# Patient Record
Sex: MALE | Race: WHITE | Employment: OTHER | URBAN - METROPOLITAN AREA
[De-identification: names, ages, dates, MRNs, and addresses within clinical notes are randomized per-mention and may not be internally consistent; named-entity substitution may affect disease eponyms.]

---

## 2017-01-19 ENCOUNTER — TELEPHONE (OUTPATIENT)
Dept: INTERNAL MEDICINE CLINIC | Age: 67
End: 2017-01-19

## 2017-01-19 NOTE — TELEPHONE ENCOUNTER
Spoke with patient, he wanted to know if he had a pneumonia vaccine before. I let him know that he had the Prevnar-13 in 12/2015. He still needs his Pneumococcal-23. Pt verbalized understanding.

## 2017-01-19 NOTE — TELEPHONE ENCOUNTER
Pt is requesting a nurse call he would like to go over his health maintenance. Pt is concerned with what shots he has been given.     Pt # [id number]

## 2020-08-11 ENCOUNTER — HOSPITAL ENCOUNTER (OUTPATIENT)
Dept: NON INVASIVE DIAGNOSTICS | Age: 70
Discharge: HOME | End: 2020-08-11
Attending: SPECIALIST | Admitting: SPECIALIST
Payer: COMMERCIAL

## 2020-08-11 VITALS
OXYGEN SATURATION: 98 % | RESPIRATION RATE: 23 BRPM | BODY MASS INDEX: 29.65 KG/M2 | DIASTOLIC BLOOD PRESSURE: 87 MMHG | HEIGHT: 74 IN | HEART RATE: 65 BPM | WEIGHT: 231 LBS | SYSTOLIC BLOOD PRESSURE: 118 MMHG

## 2020-08-11 DIAGNOSIS — I49.9 IRREGULAR CARDIAC RHYTHM: ICD-10-CM

## 2020-08-11 PROCEDURE — 92960 CARDIOVERSION ELECTRIC EXT: CPT

## 2020-08-11 PROCEDURE — 99152 MOD SED SAME PHYS/QHP 5/>YRS: CPT | Performed by: SPECIALIST

## 2020-08-11 PROCEDURE — 99152 MOD SED SAME PHYS/QHP 5/>YRS: CPT

## 2020-08-11 PROCEDURE — 74011000250 HC RX REV CODE- 250: Performed by: SPECIALIST

## 2020-08-11 PROCEDURE — 77030018729 HC ELECTRD DEFIB PAD CARD -B

## 2020-08-11 PROCEDURE — 74011250636 HC RX REV CODE- 250/636: Performed by: SPECIALIST

## 2020-08-11 RX ORDER — LIDOCAINE HYDROCHLORIDE 20 MG/ML
JELLY TOPICAL
Status: COMPLETED | OUTPATIENT
Start: 2020-08-11 | End: 2020-08-11

## 2020-08-11 RX ORDER — GUAIFENESIN 100 MG/5ML
81 LIQUID (ML) ORAL DAILY
COMMUNITY
End: 2020-10-29

## 2020-08-11 RX ORDER — LIDOCAINE 50 MG/G
OINTMENT TOPICAL
Status: DISCONTINUED | OUTPATIENT
Start: 2020-08-11 | End: 2020-08-11 | Stop reason: HOSPADM

## 2020-08-11 RX ORDER — LIDOCAINE HYDROCHLORIDE 20 MG/ML
15 SOLUTION OROPHARYNGEAL
Status: DISCONTINUED | OUTPATIENT
Start: 2020-08-11 | End: 2020-08-11 | Stop reason: HOSPADM

## 2020-08-11 RX ORDER — SODIUM CHLORIDE 9 MG/ML
10 INJECTION INTRAMUSCULAR; INTRAVENOUS; SUBCUTANEOUS
Status: DISCONTINUED | OUTPATIENT
Start: 2020-08-11 | End: 2020-08-11 | Stop reason: HOSPADM

## 2020-08-11 RX ORDER — FENTANYL CITRATE 50 UG/ML
25-200 INJECTION, SOLUTION INTRAMUSCULAR; INTRAVENOUS
Status: DISCONTINUED | OUTPATIENT
Start: 2020-08-11 | End: 2020-08-11 | Stop reason: HOSPADM

## 2020-08-11 RX ORDER — MIDAZOLAM HYDROCHLORIDE 1 MG/ML
.5-1 INJECTION, SOLUTION INTRAMUSCULAR; INTRAVENOUS
Status: DISCONTINUED | OUTPATIENT
Start: 2020-08-11 | End: 2020-08-11 | Stop reason: HOSPADM

## 2020-08-11 RX ORDER — METOPROLOL SUCCINATE 25 MG/1
25 TABLET, EXTENDED RELEASE ORAL DAILY
Status: ON HOLD | COMMUNITY
Start: 2020-08-11 | End: 2020-10-15

## 2020-08-11 RX ORDER — METOPROLOL SUCCINATE 25 MG/1
TABLET, EXTENDED RELEASE ORAL 2 TIMES DAILY
Status: ON HOLD | COMMUNITY
End: 2020-08-11 | Stop reason: SDUPTHER

## 2020-08-11 RX ADMIN — MIDAZOLAM HYDROCHLORIDE 2 MG: 1 INJECTION, SOLUTION INTRAMUSCULAR; INTRAVENOUS at 10:23

## 2020-08-11 RX ADMIN — LIDOCAINE HYDROCHLORIDE 15 ML: 20 SOLUTION ORAL; TOPICAL at 10:02

## 2020-08-11 RX ADMIN — MIDAZOLAM HYDROCHLORIDE 1 MG: 1 INJECTION, SOLUTION INTRAMUSCULAR; INTRAVENOUS at 10:35

## 2020-08-11 RX ADMIN — LIDOCAINE HYDROCHLORIDE: 20 JELLY TOPICAL at 10:28

## 2020-08-11 RX ADMIN — FENTANYL CITRATE 50 MCG: 50 INJECTION, SOLUTION INTRAMUSCULAR; INTRAVENOUS at 10:23

## 2020-08-11 RX ADMIN — SODIUM CHLORIDE 10 ML: 9 INJECTION INTRAMUSCULAR; INTRAVENOUS; SUBCUTANEOUS at 10:34

## 2020-08-11 RX ADMIN — FENTANYL CITRATE 25 MCG: 50 INJECTION, SOLUTION INTRAMUSCULAR; INTRAVENOUS at 10:37

## 2020-08-11 RX ADMIN — MIDAZOLAM HYDROCHLORIDE 1 MG: 1 INJECTION, SOLUTION INTRAMUSCULAR; INTRAVENOUS at 10:26

## 2020-08-11 RX ADMIN — LIDOCAINE: 50 OINTMENT TOPICAL at 10:11

## 2020-08-11 NOTE — H&P
Date of Surgery Update:  Arpit Sparks. was seen and examined. History and physical has been reviewed. The patient has been examined. There have been no significant clinical changes since the completion of the originally dated History and Physical.    Signed By: Grant Miranda MD     August 11, 2020 9:55 AM         Marlene Dec 1950   Office/Outpatient Visit  Visit Date: Fri, Aug 7, 2020 8:45 am  Provider: Karen Madrid MD (Assistant: Aster Garcia RN )  Location: [facility]iology of [facility])[address] [place]. [address] [phone]    Electronically signed by Jonel Boswell MD on  08/07/2020 09:06:15 AM                           Subjective:    CC: Mr. Iraida Saucedo is a 65year old White male. His primary care physician is Chiara Gil NP. Chiara Gil NP referred Mr. Iraida Saucedo to the practice for a consult. He is here today following a transition of care from his primary care provider. Patient verbalized medications unchanged since last office visit. He has a history of hypertension. HPI:           Regarding hypertension:  Type Primary Hypertension Currently, his treatment regimen consists of an ACE/ARB ( lisinopril ). 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 This is an initial office visit. The diagnosis was made on 8/5/2020. His [id number] score is 2. Current related medications include a beta blocker for rate control. He has noted shortness of breath. He has not been aware of an irregular heartbeat, chest pain, syncope or dizziness.     Past Medical History / Family History / Social History:     Last Reviewed on 8/07/2020 08:40 AM by Aster Garcia  Past Medical History:     Atrial Flutter  Hypertension   Diverticulosis  Gastroesophageal Reflux Disease   INFLUENZA VACCINE: was last done 2019     Past Cardiac Procedures/Tests:  Stress Echo:  18 - 10 mins. There was no ECG evidence of ischemia. No ischemia noted on echocardiogram, normal pre & post global wall motion. The patient did not experience chest discomfort. Normal  blood pressure response to exercise. Blunted heart rate response to exercise. Good exercise capacity. There is mild mitral regurgitation. There is trace tricuspid regurgitation. There is trace pulmonary regurgitation. Holter Monitor:  18 - Baseline rhythm was normal sinus. Average heart rate was 57 bpm.   Minimum heart rate of 41 bpm and maximum heart rate of 97 bpm.   There were frequent PACs. 8-beat run of accelerated junctional rhythm at 2:18 am on 18. There were occasional PVCs. Ventricular couplets also noted. There were PVCs in bigeminy. No pauses noted. PACs and PVCs were noted during diary entries. No arrhythmias noted in the other entries. Surgical History:   Surgical/Procedural History:   Colon resection- left ()     Family History:   Father:  at age 72  ; Positive for Myocardial Infarction; ;   Mother:   ; Positive for Cerebrovascular Accident; Social History:   Social History:   Occupation: Retired   Marital Status:    Children: None     Tobacco/Alcohol/Supplements:   Last Reviewed on 2020 08:40 AM by Kamini Almaguer  TOBACCO/ALCOHOL/SUPPLEMENTS   Tobacco: He has never smoked. Alcohol: weekends an occasional beer   Caffeine:  He admits to consuming caffeine via coffee ( 3-4 servings per day ) and soda ( 1 serving per day ). Substance Abuse History:   Last Reviewed on 2020 08:40 AM by Kamini Francisco  Substance Use/Abuse: Unremarkable     Mental Health History:   Last Reviewed on 2020 08:40 AM by Kamini Francisco  Mental Health History: NEGATIVE     Communicable Diseases (eg STDs):    Last Reviewed on 2020 08:40 AM by Kamini Francisco    Current Problems:   Last Reviewed on 2020 08:40 AM by Kamini Francisco  Essential hypertension, benign  Essential (primary) hypertension  Sinus bradycardia  Cardiac Murmur  Cardiac murmur  Arrhythmia  Typical atrial flutter    Allergies:   Last Reviewed on 8/07/2020 08:40 AM by Kamini Francisco  No Known Allergies. Current Medications:   Last Reviewed on 8/07/2020 08:40 AM by Kamini Francisco  multivitamin oral tablet [1 po qd]  aspirin 81 mg oral tablet,chewable [1 po qd]  metoprolol tartrate 25 mg oral tablet [take 1 tablet (25 mg) by oral route 2 times per day]  lisinopriL 20 mg oral tablet [1/4 po qd to equal 5mg daily]  metamucil  [1 po bid]    Objective:    Vitals:     Current: 8/7/2020 8:39:42 AM  Ht:  6 ft, 1 in; Wt: 204 lbs;  BMI: 26. 9BP: 134/83 mm Hg (left arm, sitting);  P: 65 bpm;  sCr: 0.92 mg/dL;  GFR: 76.90    Repeat:   8:39:54 AM  BP:   136/82mm Hg (left arm, standing)   Exams:   GENERAL:  Alert, oriented to person, place and time. HEENT:  Pinkish palpebral  conjunctivae. Anicteric sclerae. NECK:  No jugular vein engorgement. No bruit. CHEST: Equal expansion. Clear breath sounds. No rales, no wheezing. Heart: Irregular rhythm. Grade 2/6 systolic ejection murmur at the left sternal border area. ABDOMEN:  Soft. Normal active bowel sounds. No tenderness. EXTREMITIES:  No pitting pedal edema. Equal pulses bilaterally. NEURO:  Grossly intact.       Lab/Test Results:     Sodium, Serum: 144 (05/28/2020),   Potassium, Serum: 4.6 (05/28/2020),   Glucose Serum: 101 (05/28/2020),   BUN serum/plasma (sCnc): 17 (05/28/2020),   Creatinine, Serum: 0.92 (05/28/2020),   HgbA1c: 6.0 (05/28/2020),   Total Cholesterol: 163 (05/28/2020),   Triglycerides: 63 (05/28/2020),   HDL Target >55: 42 (05/28/2020),   LDL Target <110: 108 (05/28/2020),   AST (SGOT): 19 (05/28/2020),   ALT (SGPT): 24 (05/28/2020),   WBC count: 5.7 (05/28/2020),   RBC count: 5.43 (05/28/2020),   Hgb: 14.5 (05/28/2020),   Hct: 45.4 (05/28/2020),   MCV: 84 (05/28/2020),   Platelets: 161 (86/78/2322),       Procedures: Typical atrial flutter    ECG INTERPRETATION: See scanned EKG for results. Assessment:     I10   Essential (primary) hypertension     I48. 3   Typical atrial flutter       ORDERS:     Procedures Ordered:     01452  Electrocardiogram, routine with at least 12 leads; with interpretation and report  (In-House)          26269  Education and train for pt self-mgmt by qualifiedNevaeh 30 minutes; individual pt  (Send-Out)          RFCARD  Cardiologist Referral  (Send-Out)          Other Orders:     [id number]  Queried Patient for Tobacco Use  (Send-Out)              Plan:     Typical atrial flutter  1. Medication list has been reviewed. Start the following medication(s):  Xarelto 20 mg 1 tablet daily. 2.  Advised the patient regarding diet, exercise, and lifestyle modification. Explained to the patient the treatment and complication of atrial flutter and the risk and benefits of blood thinners. Patient understood and agreed to start Xarelto. No alcohol and NSAID's  3. The patient to call the office if there is any change in his cardiac symptoms. 4.  Explained to the patient the importance of controlling his cardiac risk factors. Testing/Procedures:  Cardioversion - to be done in one week Dr. Lynnette Anderson to be done at at [facility].    ED - to be done in one week Dr. Lynnette Anderson to be done at at [facility].  .  The indication, procedure, risks, and benefits of the procedure were explained to the patient. He understands and agreed to proceed with the test.      Schedule a follow-up appointment in 1 week. Referrals:  I am referring Mr. Lissett Noriega to an electrophysiologist: Dr. Carlos Olguin. The above note was transcribed by Bryon Darden. and authenticated by Dr. Omar Chacon prior to sign off.

## 2020-08-11 NOTE — DISCHARGE INSTRUCTIONS
Discharge Instructions:     Medications: Activity:     As tolerated. Diet:     American Heart Association. Follow-up:     Follow up with Moses Alvarenga MD on August 18th, 2020 at 8:15am.  [address] [facility]  [phone]      If you smoke, STOP!

## 2020-08-11 NOTE — PROCEDURES
Indication:  Atrial flutter. Procedure:  Informed consent was obtained. The patient was anesthetized with fentanyl and versed. The patient was converted from atrial flutter to normal sinus rhythm with a single biphasic shock of 200 joules. The patient tolerated the procedure well without obvious complications and was transferred to the holding room in stable condition. Summary:  Successful DCCV of atrial flutter to NSR.     F/U with Dr. Yefri Quevedo on 8/18/20 @ 8:15am.

## 2020-08-11 NOTE — ROUTINE PROCESS
Patient arrived to Non-Invasive Cardiology Lab for Out Patient ED/DCCV Procedure. Staff introduced to patient. Patient identifiers verified with Name and Date of Birth. Procedure verified with patient. Consent forms reviewed and signed by patient or authorized representative and verified. Allergies verified. Patient informed of procedure and plan of care. Questions answered with review. Patient on cardiac monitor, non-invasive blood pressure, SPO2 monitor. Patient is A&Ox3. Patient reports no complaints. Patient on stretcher, in low position, with side rails up. Patient instructed to call for assistance as needed. Family in waiting room.

## 2020-08-14 ENCOUNTER — TELEPHONE (OUTPATIENT)
Dept: CARDIOLOGY CLINIC | Age: 70
End: 2020-08-14

## 2020-08-14 NOTE — TELEPHONE ENCOUNTER
----- Message from Berry Cherry sent at 8/14/2020 12:02 PM EDT -----  Hello,    Called pt to get him scheduled pt informed me that he had a cardioversion on Tuesday still In hosp and is doing ok no more aflutter past 2 ekg's did not show anything. If he needs to see Dr Renato Grijalva in the future he will call to make the appt. Chey Mcgee   ----- Message -----  From: Felicita Wasserman LPN  Sent: 2/64/4142   1:28 PM EDT  To: Berry Cherry    Referral received today from Dr. Dom Cruz, DX: Atrial Flutter.

## 2020-09-23 ENCOUNTER — OFFICE VISIT (OUTPATIENT)
Dept: CARDIOLOGY CLINIC | Age: 70
End: 2020-09-23
Payer: COMMERCIAL

## 2020-09-23 VITALS
WEIGHT: 207 LBS | SYSTOLIC BLOOD PRESSURE: 138 MMHG | OXYGEN SATURATION: 97 % | DIASTOLIC BLOOD PRESSURE: 90 MMHG | HEART RATE: 56 BPM | HEIGHT: 74 IN | BODY MASS INDEX: 26.56 KG/M2

## 2020-09-23 DIAGNOSIS — I49.9 IRREGULAR CARDIAC RHYTHM: Primary | ICD-10-CM

## 2020-09-23 DIAGNOSIS — I45.10 INCOMPLETE RBBB: ICD-10-CM

## 2020-09-23 PROCEDURE — G8419 CALC BMI OUT NRM PARAM NOF/U: HCPCS | Performed by: INTERNAL MEDICINE

## 2020-09-23 PROCEDURE — 93000 ELECTROCARDIOGRAM COMPLETE: CPT | Performed by: INTERNAL MEDICINE

## 2020-09-23 PROCEDURE — 93005 ELECTROCARDIOGRAM TRACING: CPT | Performed by: INTERNAL MEDICINE

## 2020-09-23 PROCEDURE — G9711 PT HX TOT COL OR COLON CA: HCPCS | Performed by: INTERNAL MEDICINE

## 2020-09-23 PROCEDURE — 99205 OFFICE O/P NEW HI 60 MIN: CPT | Performed by: INTERNAL MEDICINE

## 2020-09-23 PROCEDURE — G8510 SCR DEP NEG, NO PLAN REQD: HCPCS | Performed by: INTERNAL MEDICINE

## 2020-09-23 PROCEDURE — G0463 HOSPITAL OUTPT CLINIC VISIT: HCPCS | Performed by: INTERNAL MEDICINE

## 2020-09-23 PROCEDURE — G8536 NO DOC ELDER MAL SCRN: HCPCS | Performed by: INTERNAL MEDICINE

## 2020-09-23 PROCEDURE — G8427 DOCREV CUR MEDS BY ELIG CLIN: HCPCS | Performed by: INTERNAL MEDICINE

## 2020-09-23 PROCEDURE — 1101F PT FALLS ASSESS-DOCD LE1/YR: CPT | Performed by: INTERNAL MEDICINE

## 2020-09-23 NOTE — PROGRESS NOTES
Room 5    Visit Vitals  BP (!) 138/90 (BP 1 Location: Left arm, BP Patient Position: Sitting)   Pulse (!) 56   Ht 6' 2\" (1.88 m)   Wt 207 lb (93.9 kg)   SpO2 97%   BMI 26.58 kg/m²       Cardioversion Aug, 2020    Saw Alicia Alvarenga a week ago, ekg was normal     Chest pain: no  Shortness of breath: no  Edema: no  Palpitations, Skipped beats, Rapid heartbeat: no  Dizziness: no    New diagnosis/Surgeries: no    ER/Hospitalizations: Aug 2020    Refills: no

## 2020-09-23 NOTE — PROGRESS NOTES
HISTORY OF PRESENTING ILLNESS      Raquel Nichole is a 65 y.o. male with atrial flutter s/p ED/DCCV recently referred by Dr. Carol Silverio to discuss ablation. PAST MEDICAL HISTORY     Past Medical History:   Diagnosis Date    Agatston CAC score, <100 06/10/2015    Coronary calcium score 4.    Arthritis     DDD (degenerative disc disease), thoracic     Degenerative arthritis of thumb     Left    Diverticulitis     Diverticulosis of colon 09/29/2016    of the descending colon; moderate    GERD (gastroesophageal reflux disease)     Incomplete RBBB     Numbness     Right hand    Polyp of ascending colon 09/29/2016    polyps (5mm) in the ascenidning colon and transverse colon    Pruritus ani     Sinus headache            PAST SURGICAL HISTORY     Past Surgical History:   Procedure Laterality Date    ABDOMEN SURGERY PROC UNLISTED  12/12    exploratory, small bowel obstruction, internal hernia    ENDOSCOPY, COLON, DIAGNOSTIC  2005    HX COLECTOMY  2005    colectomy for diverticulitis    HX COLONOSCOPY  12/13    HX OTHER SURGICAL      Removal of colon polyps          ALLERGIES     Allergies   Allergen Reactions    Ciprofloxacin Other (comments)     \"Joint pain\"    Naproxen Nausea Only     Patient was overdose, GI side effects          FAMILY HISTORY     Family History   Problem Relation Age of Onset    Stroke Mother     Heart Disease Father     negative for cardiac disease       SOCIAL HISTORY     Social History     Socioeconomic History    Marital status:      Spouse name: Not on file    Number of children: Not on file    Years of education: Not on file    Highest education level: Not on file   Tobacco Use    Smoking status: Never Smoker    Smokeless tobacco: Never Used   Substance and Sexual Activity    Alcohol use: Yes     Comment: rarely    Drug use: No         MEDICATIONS     Current Outpatient Medications   Medication Sig    FIBER CHOICE PO Take  by mouth.  lisinopril (PRINIVIL, ZESTRIL) 5 mg tablet Take 1 Tab by mouth daily.  multivitamin (ONE A DAY) tablet Take 1 Tab by mouth daily.  aspirin 81 mg chewable tablet Take 81 mg by mouth daily.  metoprolol succinate (TOPROL-XL) 25 mg XL tablet Take 1 Tab by mouth daily.  rivaroxaban (Xarelto) 20 mg tab tablet Take 1 Tab by mouth daily (with dinner).  psyllium (METAMUCIL) packet Take 1 Packet by mouth two (2) times a day. No current facility-administered medications for this visit. I have reviewed the nurses notes, vitals, problem list, allergy list, medical history, family, social history and medications. REVIEW OF SYMPTOMS      General: Pt denies excessive weight gain or loss. Pt is able to conduct ADL's  HEENT: Denies blurred vision, headaches, hearing loss, epistaxis and difficulty swallowing. Respiratory: Denies cough, congestion, shortness of breath, BELTRAN, wheezing or stridor. Cardiovascular: Denies precordial pain, palpitations, edema or PND  Gastrointestinal: Denies poor appetite, indigestion, abdominal pain or blood in stool  Genitourinary: Denies hematuria, dysuria, increased urinary frequency  Musculoskeletal: Denies joint pain or swelling from muscles or joints  Neurologic: Denies tremor, paresthesias, headache, or sensory motor disturbance  Psychiatric: Denies confusion, insomnia, depression  Integumentray: Denies rash, itching or ulcers. Hematologic: Denies easy bruising, bleeding       PHYSICAL EXAMINATION      Vitals: see vitals section  General: Well developed, in no acute distress. HEENT: No jaundice, oral mucosa moist, no oral ulcers  Neck: Supple, no stiffness, no lymphadenopathy, supple  Heart:  Normal S1/S2 negative S3 or S4. Regular, no murmur, gallop or rub, no jugular venous distention  Respiratory: Clear bilaterally x 4, no wheezing or rales  Abdomen:   Soft, non-tender, bowel sounds are active.    Extremities:  No edema, normal cap refill, no cyanosis. Musculoskeletal: No clubbing, no deformities  Neuro: A&Ox3, speech clear, gait stable, cooperative, no focal neurologic deficits  Skin: Skin color is normal. No rashes or lesions. Non diaphoretic, moist.  Vascular: 2+ pulses symmetric in all extremities       DIAGNOSTIC DATA      EKG:        LABORATORY DATA      Lab Results   Component Value Date/Time    WBC 6.5 11/25/2015 08:24 AM    HGB 13.9 11/25/2015 08:24 AM    HCT 42.3 11/25/2015 08:24 AM    PLATELET 057 81/04/6451 08:24 AM    MCV 84.3 11/25/2015 08:24 AM      Lab Results   Component Value Date/Time    Sodium 141 11/25/2015 08:24 AM    Potassium 4.7 11/25/2015 08:24 AM    Chloride 107 11/25/2015 08:24 AM    CO2 30 11/25/2015 08:24 AM    Anion gap 4 11/25/2015 08:24 AM    Glucose 99 11/25/2015 08:24 AM    BUN 22 (H) 11/25/2015 08:24 AM    Creatinine 1.09 11/25/2015 08:24 AM    BUN/Creatinine ratio 20 11/25/2015 08:24 AM    GFR est AA >60 11/25/2015 08:24 AM    GFR est non-AA >60 11/25/2015 08:24 AM    Calcium 8.7 11/25/2015 08:24 AM    Bilirubin, total 0.3 11/25/2015 08:24 AM    Alk. phosphatase 86 11/25/2015 08:24 AM    Protein, total 6.1 (L) 11/25/2015 08:24 AM    Albumin 3.5 11/25/2015 08:24 AM    Globulin 2.6 11/25/2015 08:24 AM    A-G Ratio 1.3 11/25/2015 08:24 AM    ALT (SGPT) 37 11/25/2015 08:24 AM           ASSESSMENT      1. Atrial flutter   A. DCCV  2. Hypertension         PLAN     Plan for atrial flutter ablation with conscious sedation. Continue all meds prior. ICD-10-CM ICD-9-CM    1. Irregular cardiac rhythm  I49.9 427.9 AMB POC EKG ROUTINE W/ 12 LEADS, INTER & REP   2. Incomplete RBBB  I45.10 426.4      Orders Placed This Encounter    AMB POC EKG ROUTINE W/ 12 LEADS, INTER & REP     Order Specific Question:   Reason for Exam:     Answer:   Atrial flutter    FIBER CHOICE PO     Sig: Take  by mouth.           FOLLOW-UP       Thank you, Sonia Cazares MD and Dr. Melissa Pendleton for allowing me to participate in the care of this extraordinarily pleasant male. Please do not hesitate to contact me for further questions/concerns.          Shanon Curtis MD  Cardiac Electrophysiology / Cardiology    [address]  [facility] [facility], [facility], [address]  [phone] / [phone] Fax   [phone] / [phone] Fax

## 2020-09-24 ENCOUNTER — TELEPHONE (OUTPATIENT)
Dept: CARDIOLOGY CLINIC | Age: 70
End: 2020-09-24

## 2020-09-24 DIAGNOSIS — I49.9 IRREGULAR CARDIAC RHYTHM: Primary | ICD-10-CM

## 2020-09-24 DIAGNOSIS — I48.92 ATRIAL FLUTTER, UNSPECIFIED TYPE (HCC): ICD-10-CM

## 2020-09-24 DIAGNOSIS — Z01.812 PRE-PROCEDURE LAB EXAM: ICD-10-CM

## 2020-09-24 RX ORDER — SODIUM CHLORIDE 0.9 % (FLUSH) 0.9 %
5-40 SYRINGE (ML) INJECTION EVERY 8 HOURS
Status: CANCELLED | OUTPATIENT
Start: 2020-09-24

## 2020-09-24 RX ORDER — SODIUM CHLORIDE 0.9 % (FLUSH) 0.9 %
5-40 SYRINGE (ML) INJECTION AS NEEDED
Status: CANCELLED | OUTPATIENT
Start: 2020-09-24

## 2020-09-24 NOTE — TELEPHONE ENCOUNTER
Received call from patient, ID verified using two patient identifiers. Scheduled for AFL ablation with Dr. Amparo Richardson @ [facility] on Thursday 10/15/20 with an arrival time of 6 AM. Pre procedure instructions to be mailed to patient's home address. Patient agreeable to this plan.

## 2020-09-24 NOTE — TELEPHONE ENCOUNTER
Called patient, no answer. LM to have call returned to [phone]. Need to schedule AFL ablation with Dr. Jamil Padilla.

## 2020-10-11 ENCOUNTER — TRANSCRIBE ORDER (OUTPATIENT)
Dept: REGISTRATION | Age: 70
End: 2020-10-11

## 2020-10-11 ENCOUNTER — HOSPITAL ENCOUNTER (OUTPATIENT)
Dept: LAB | Age: 70
Discharge: HOME | End: 2020-10-11
Payer: COMMERCIAL

## 2020-10-11 DIAGNOSIS — U07.1 COVID-19: ICD-10-CM

## 2020-10-11 DIAGNOSIS — U07.1 COVID-19: Primary | ICD-10-CM

## 2020-10-11 PROCEDURE — 87635 SARS-COV-2 COVID-19 AMP PRB: CPT

## 2020-10-12 ENCOUNTER — TELEPHONE (OUTPATIENT)
Dept: CARDIOLOGY CLINIC | Age: 70
End: 2020-10-12

## 2020-10-12 LAB — SARS-COV-2, COV2NT: NOT DETECTED

## 2020-10-12 NOTE — TELEPHONE ENCOUNTER
Received call from patient, ID verified using two patient identifiers. Patient states he received a voicemail message to return call tor our office. Advised patient that I had not called and that I did not see any messages from anyone else that they had called him. Patient verbalizes understanding and will try to listen to the message again to verify who called.

## 2020-10-13 ENCOUNTER — TELEPHONE (OUTPATIENT)
Dept: CARDIOLOGY CLINIC | Age: 70
End: 2020-10-13

## 2020-10-13 LAB
BUN SERPL-MCNC: 20 MG/DL (ref 8–27)
BUN/CREAT SERPL: 24 (ref 10–24)
CALCIUM SERPL-MCNC: 9.7 MG/DL (ref 8.6–10.2)
CHLORIDE SERPL-SCNC: 108 MMOL/L (ref 96–106)
CO2 SERPL-SCNC: 18 MMOL/L (ref 20–29)
CREAT SERPL-MCNC: 0.85 MG/DL (ref 0.76–1.27)
ERYTHROCYTE [DISTWIDTH] IN BLOOD BY AUTOMATED COUNT: 13.8 % (ref 11.6–15.4)
GLUCOSE SERPL-MCNC: 98 MG/DL (ref 65–99)
HCT VFR BLD AUTO: 42.5 % (ref 37.5–51)
HGB BLD-MCNC: 13.7 G/DL (ref 13–17.7)
INR PPP: 1.1 (ref 0.9–1.2)
MCH RBC QN AUTO: 27 PG (ref 26.6–33)
MCHC RBC AUTO-ENTMCNC: 32.2 G/DL (ref 31.5–35.7)
MCV RBC AUTO: 84 FL (ref 79–97)
PLATELET # BLD AUTO: 226 X10E3/UL (ref 150–450)
POTASSIUM SERPL-SCNC: 4.5 MMOL/L (ref 3.5–5.2)
PROTHROMBIN TIME: 11.5 SEC (ref 9.1–12)
RBC # BLD AUTO: 5.07 X10E6/UL (ref 4.14–5.8)
SODIUM SERPL-SCNC: 141 MMOL/L (ref 134–144)
WBC # BLD AUTO: 6.7 X10E3/UL (ref 3.4–10.8)

## 2020-10-13 NOTE — TELEPHONE ENCOUNTER
Pt requesting to speak with Katy in regards to his procedure on 10/15.  Please advise    NTUGN:[phone]

## 2020-10-13 NOTE — TELEPHONE ENCOUNTER
Returned patient call, ID verified using two patient identifiers. Patient states he is having a runny nose and occasional sneeze. Wants to make sure he can proceed with his AFL ablation on 10/15/20. Advised patient that he should be fine to proceed. He will contact the office if he develops a fever, chills or cough prior to his procedure.

## 2020-10-15 ENCOUNTER — HOSPITAL ENCOUNTER (OUTPATIENT)
Age: 70
Setting detail: OUTPATIENT SURGERY
Discharge: HOME | End: 2020-10-15
Attending: INTERNAL MEDICINE | Admitting: INTERNAL MEDICINE
Payer: COMMERCIAL

## 2020-10-15 VITALS
SYSTOLIC BLOOD PRESSURE: 124 MMHG | RESPIRATION RATE: 15 BRPM | HEART RATE: 56 BPM | WEIGHT: 201.72 LBS | TEMPERATURE: 97.6 F | BODY MASS INDEX: 25.89 KG/M2 | OXYGEN SATURATION: 98 % | HEIGHT: 74 IN | DIASTOLIC BLOOD PRESSURE: 47 MMHG

## 2020-10-15 DIAGNOSIS — I48.92 ATRIAL FLUTTER, UNSPECIFIED TYPE (HCC): ICD-10-CM

## 2020-10-15 PROCEDURE — 99153 MOD SED SAME PHYS/QHP EA: CPT | Performed by: INTERNAL MEDICINE

## 2020-10-15 PROCEDURE — 93613 INTRACARDIAC EPHYS 3D MAPG: CPT | Performed by: INTERNAL MEDICINE

## 2020-10-15 PROCEDURE — 93621 COMP EP EVL L PAC&REC C SINS: CPT | Performed by: INTERNAL MEDICINE

## 2020-10-15 PROCEDURE — 77030029065 HC DRSG HEMO QCLOT ZMED -B: Performed by: INTERNAL MEDICINE

## 2020-10-15 PROCEDURE — C1760 CLOSURE DEV, VASC: HCPCS | Performed by: INTERNAL MEDICINE

## 2020-10-15 PROCEDURE — C1894 INTRO/SHEATH, NON-LASER: HCPCS | Performed by: INTERNAL MEDICINE

## 2020-10-15 PROCEDURE — C1732 CATH, EP, DIAG/ABL, 3D/VECT: HCPCS | Performed by: INTERNAL MEDICINE

## 2020-10-15 PROCEDURE — 99152 MOD SED SAME PHYS/QHP 5/>YRS: CPT | Performed by: INTERNAL MEDICINE

## 2020-10-15 PROCEDURE — 93653 COMPRE EP EVAL TX SVT: CPT | Performed by: INTERNAL MEDICINE

## 2020-10-15 PROCEDURE — 74011000250 HC RX REV CODE- 250: Performed by: INTERNAL MEDICINE

## 2020-10-15 PROCEDURE — 77030027107 HC PTCH EXT REF CARTO3 J&J -F: Performed by: INTERNAL MEDICINE

## 2020-10-15 PROCEDURE — 77030018729 HC ELECTRD DEFIB PAD CARD -B: Performed by: INTERNAL MEDICINE

## 2020-10-15 PROCEDURE — 74011250636 HC RX REV CODE- 250/636: Performed by: INTERNAL MEDICINE

## 2020-10-15 PROCEDURE — 2709999900 HC NON-CHARGEABLE SUPPLY: Performed by: INTERNAL MEDICINE

## 2020-10-15 RX ORDER — HEPARIN SODIUM 200 [USP'U]/100ML
INJECTION, SOLUTION INTRAVENOUS
Status: COMPLETED | OUTPATIENT
Start: 2020-10-15 | End: 2020-10-15

## 2020-10-15 RX ORDER — DIPHENHYDRAMINE HYDROCHLORIDE 50 MG/ML
INJECTION, SOLUTION INTRAMUSCULAR; INTRAVENOUS AS NEEDED
Status: DISCONTINUED | OUTPATIENT
Start: 2020-10-15 | End: 2020-10-15 | Stop reason: HOSPADM

## 2020-10-15 RX ORDER — MIDAZOLAM HYDROCHLORIDE 1 MG/ML
INJECTION, SOLUTION INTRAMUSCULAR; INTRAVENOUS AS NEEDED
Status: DISCONTINUED | OUTPATIENT
Start: 2020-10-15 | End: 2020-10-15 | Stop reason: HOSPADM

## 2020-10-15 RX ORDER — SODIUM CHLORIDE 9 MG/ML
25 INJECTION, SOLUTION INTRAVENOUS CONTINUOUS
Status: DISCONTINUED | OUTPATIENT
Start: 2020-10-15 | End: 2020-10-15 | Stop reason: HOSPADM

## 2020-10-15 RX ORDER — LIDOCAINE HYDROCHLORIDE 10 MG/ML
INJECTION INFILTRATION; PERINEURAL AS NEEDED
Status: DISCONTINUED | OUTPATIENT
Start: 2020-10-15 | End: 2020-10-15 | Stop reason: HOSPADM

## 2020-10-15 RX ORDER — ACETAMINOPHEN 325 MG/1
650 TABLET ORAL
Status: CANCELLED | OUTPATIENT
Start: 2020-10-15

## 2020-10-15 RX ORDER — ONDANSETRON 2 MG/ML
4 INJECTION INTRAMUSCULAR; INTRAVENOUS
Status: CANCELLED | OUTPATIENT
Start: 2020-10-15

## 2020-10-15 RX ORDER — ONDANSETRON 2 MG/ML
INJECTION INTRAMUSCULAR; INTRAVENOUS AS NEEDED
Status: DISCONTINUED | OUTPATIENT
Start: 2020-10-15 | End: 2020-10-15 | Stop reason: HOSPADM

## 2020-10-15 RX ORDER — HYDROCODONE BITARTRATE AND ACETAMINOPHEN 5; 325 MG/1; MG/1
1 TABLET ORAL
Status: CANCELLED | OUTPATIENT
Start: 2020-10-15

## 2020-10-15 RX ORDER — SODIUM CHLORIDE 0.9 % (FLUSH) 0.9 %
5-40 SYRINGE (ML) INJECTION EVERY 8 HOURS
Status: CANCELLED | OUTPATIENT
Start: 2020-10-15

## 2020-10-15 RX ORDER — FENTANYL CITRATE 50 UG/ML
INJECTION, SOLUTION INTRAMUSCULAR; INTRAVENOUS AS NEEDED
Status: DISCONTINUED | OUTPATIENT
Start: 2020-10-15 | End: 2020-10-15 | Stop reason: HOSPADM

## 2020-10-15 RX ORDER — SODIUM CHLORIDE 0.9 % (FLUSH) 0.9 %
5-40 SYRINGE (ML) INJECTION AS NEEDED
Status: CANCELLED | OUTPATIENT
Start: 2020-10-15

## 2020-10-15 RX ADMIN — SODIUM CHLORIDE 25 ML/HR: 900 INJECTION, SOLUTION INTRAVENOUS at 07:05

## 2020-10-15 NOTE — H&P
HISTORY OF PRESENTING ILLNESS      Bryson Mcmillan is a 65 y.o. male with atrial flutter s/p ED/DCCV.       PAST MEDICAL HISTORY           Past Medical History:   Diagnosis Date    Agatston CAC score, <100 06/10/2015     Coronary calcium score 4.    Arthritis      DDD (degenerative disc disease), thoracic      Degenerative arthritis of thumb       Left    Diverticulitis      Diverticulosis of colon 09/29/2016     of the descending colon; moderate    GERD (gastroesophageal reflux disease)      Incomplete RBBB      Numbness       Right hand    Polyp of ascending colon 09/29/2016     polyps (5mm) in the ascenidning colon and transverse colon    Pruritus ani      Sinus headache               PAST SURGICAL HISTORY            Past Surgical History:   Procedure Laterality Date    ABDOMEN SURGERY PROC UNLISTED   12/12     exploratory, small bowel obstruction, internal hernia    ENDOSCOPY, COLON, DIAGNOSTIC   2005    HX COLECTOMY   2005     colectomy for diverticulitis    HX COLONOSCOPY   12/13    HX OTHER SURGICAL         Removal of colon polyps             ALLERGIES      Allergies   Allergen Reactions    Ciprofloxacin Other (comments)       \"Joint pain\"    Naproxen Nausea Only       Patient was overdose, GI side effects            FAMILY HISTORY            Family History   Problem Relation Age of Onset    Stroke Mother      Heart Disease Father      negative for cardiac disease         SOCIAL HISTORY      Social History               Socioeconomic History    Marital status:        Spouse name: Not on file    Number of children: Not on file    Years of education: Not on file    Highest education level: Not on file   Tobacco Use    Smoking status: Never Smoker    Smokeless tobacco: Never Used   Substance and Sexual Activity    Alcohol use: Yes       Comment: rarely    Drug use:  No              MEDICATIONS           Current Outpatient Medications   Medication Sig  FIBER CHOICE PO Take  by mouth.  lisinopril (PRINIVIL, ZESTRIL) 5 mg tablet Take 1 Tab by mouth daily.  multivitamin (ONE A DAY) tablet Take 1 Tab by mouth daily.  aspirin 81 mg chewable tablet Take 81 mg by mouth daily.  metoprolol succinate (TOPROL-XL) 25 mg XL tablet Take 1 Tab by mouth daily.  rivaroxaban (Xarelto) 20 mg tab tablet Take 1 Tab by mouth daily (with dinner).  psyllium (METAMUCIL) packet Take 1 Packet by mouth two (2) times a day.      No current facility-administered medications for this visit.          I have reviewed the nurses notes, vitals, problem list, allergy list, medical history, family, social history and medications.         REVIEW OF SYMPTOMS      General: Pt denies excessive weight gain or loss. Pt is able to conduct ADL's  HEENT: Denies blurred vision, headaches, hearing loss, epistaxis and difficulty swallowing. Respiratory: Denies cough, congestion, shortness of breath, BELTRAN, wheezing or stridor. Cardiovascular: Denies precordial pain, palpitations, edema or PND  Gastrointestinal: Denies poor appetite, indigestion, abdominal pain or blood in stool  Genitourinary: Denies hematuria, dysuria, increased urinary frequency  Musculoskeletal: Denies joint pain or swelling from muscles or joints  Neurologic: Denies tremor, paresthesias, headache, or sensory motor disturbance  Psychiatric: Denies confusion, insomnia, depression  Integumentray: Denies rash, itching or ulcers. Hematologic: Denies easy bruising, bleeding         PHYSICAL EXAMINATION      Vitals: see vitals section  General: Well developed, in no acute distress. HEENT: No jaundice, oral mucosa moist, no oral ulcers  Neck: Supple, no stiffness, no lymphadenopathy, supple  Heart:  Normal S1/S2 negative S3 or S4.  Regular, no murmur, gallop or rub, no jugular venous distention  Respiratory: Clear bilaterally x 4, no wheezing or rales  Abdomen:   Soft, non-tender, bowel sounds are active.   Extremities:  No edema, normal cap refill, no cyanosis. Musculoskeletal: No clubbing, no deformities  Neuro: A&Ox3, speech clear, gait stable, cooperative, no focal neurologic deficits  Skin: Skin color is normal. No rashes or lesions. Non diaphoretic, moist.  Vascular: 2+ pulses symmetric in all extremities         DIAGNOSTIC DATA      EKG:          LABORATORY DATA            Lab Results   Component Value Date/Time     WBC 6.5 11/25/2015 08:24 AM     HGB 13.9 11/25/2015 08:24 AM     HCT 42.3 11/25/2015 08:24 AM     PLATELET 860 27/36/7555 08:24 AM     MCV 84.3 11/25/2015 08:24 AM            Lab Results   Component Value Date/Time     Sodium 141 11/25/2015 08:24 AM     Potassium 4.7 11/25/2015 08:24 AM     Chloride 107 11/25/2015 08:24 AM     CO2 30 11/25/2015 08:24 AM     Anion gap 4 11/25/2015 08:24 AM     Glucose 99 11/25/2015 08:24 AM     BUN 22 (H) 11/25/2015 08:24 AM     Creatinine 1.09 11/25/2015 08:24 AM     BUN/Creatinine ratio 20 11/25/2015 08:24 AM     GFR est AA >60 11/25/2015 08:24 AM     GFR est non-AA >60 11/25/2015 08:24 AM     Calcium 8.7 11/25/2015 08:24 AM     Bilirubin, total 0.3 11/25/2015 08:24 AM     Alk. phosphatase 86 11/25/2015 08:24 AM     Protein, total 6.1 (L) 11/25/2015 08:24 AM     Albumin 3.5 11/25/2015 08:24 AM     Globulin 2.6 11/25/2015 08:24 AM     A-G Ratio 1.3 11/25/2015 08:24 AM     ALT (SGPT) 37 11/25/2015 08:24 AM             ASSESSMENT      1. Atrial flutter             A. DCCV  2.  Hypertension            PLAN      Plan for atrial flutter ablation with conscious sedation.           Harpreet Purdy MD  Cardiac Electrophysiology / Cardiology     [address] [facility] [address], [address] [place] [address], [address]  [facility] [address]  [phone] / [phone] Fax [phone] / [phone] Fax

## 2020-10-15 NOTE — ROUTINE PROCESS
Cardiac Cath Lab Recovery Arrival Note: 
 
 
Keyur Song arrived to Cardiac Cath Lab, Recovery Area. Staff introduced to patient. Patient identifiers verified with NAME and DATE OF BIRTH. Procedure verified with patient. Consent forms reviewed and signed by patient or authorized representative and verified. Allergies verified. Patient and family oriented to department. Patient and family informed of procedure and plan of care. Questions answered with review. Patient prepped for procedure, per orders from physician, prior to arrival. 
 
Patient on cardiac monitor, non-invasive blood pressure, SPO2 monitor. On room air. Patient is A&Ox 3. Patient reports no CP. Patient in stretcher, in low position, with side rails up, call bell within reach, patient instructed to call if assistance as needed. Patient prep in: [address], [place] [address] Prep by: NESTOR 
 
5195 TRANSFER - OUT REPORT: 
 
Verbal report given to Tyrone stark on Keyur Rajan. being transferred to procedure room for ordered procedure Report consisted of patients Situation, Background, Assessment and  
Recommendations(SBAR). Information from the following report(s) SBAR was reviewed with the receiving nurse. Opportunity for questions and clarification was provided. [address] TRANSFER - IN REPORT: 
 
Verbal report received from Meera Alvarez on Keyur Kiss.  being received from procedure room for routine progression of care. Report consisted of patients Situation, Background, Assessment and Recommendations(SBAR). Information from the following report(s) SBAR was reviewed with the receiving clinician. Opportunity for questions and clarification was provided. Assessment completed upon patients arrival to [address] [place] [address] and care assumed. Cardiac Cath Lab Recovery Arrival Note: 
 
Keyur Song arrived to [facility].   Patient procedure= A-Flutter Ablation. Patient on cardiac monitor, non-invasive blood pressure, SPO2 monitor. On room air. IV  of NS on pump at 25 ml/hr. Patient status doing well without problems. Patient is A&Ox 3. Patient reports no CP. PROCEDURE SITE CHECK: 
 
Procedure site:without any bleeding and no hematoma, No pain/discomfort reported at procedure site. No change in patient status. Continue to monitor patient and status. [facility] Pt. discharge with questions answered and pt/family verbalized understanding. Will call if any questions arise.

## 2020-10-15 NOTE — DISCHARGE INSTRUCTIONS
Patient Education        Learning About Catheter Ablation for Heart Rhythm Problems  What is catheter ablation? Catheter ablation is a procedure that treats heart rhythm problems. These problems include atrial fibrillation, supraventricular tachycardia (SVT), atrial flutter, and ventricular tachycardia. Your heart should have a strong, steady beat. That beat is controlled by the heart's electrical system. Sometimes that system misfires. This causes a heartbeat that is too fast and isn't steady. Catheter ablation is a way to get into your heart and fix the problem. Ablation is not surgery. How is catheter ablation done? Your doctor inserts thin tubes called catheters into a blood vessel in your groin, arm, or neck. Then your doctor feeds them into the heart. Wires in the catheters help the doctor find the problem areas. Then the doctor uses the wires to send energy to destroy the tiny areas of heart tissue that are causing the problems. It may seem like a bad idea to destroy parts of your heart on purpose. But the areas that are destroyed are very tiny. They should not affect your heart's ability to do its job. You may be awake during the procedure. Or you may be asleep. The doctor will give you medicines to help you feel relaxed and to numb the areas where the catheters go in. You may feel a little uncomfortable, but you should not feel pain. What can you expect after catheter ablation? You may stay overnight in the hospital. How long you stay in the hospital depends on the type of ablation you have. Do not exercise hard or lift anything heavy for a week. You will probably be able to go back to work and to your normal routine in 1 or 2 days. You may have swelling, bruising, or a small lump around the site where the catheters went into your body. These should go away in 3 to 4 weeks. You may have to take some medicines for a while. Follow-up care is a key part of your treatment and safety.  Be sure to make and go to all appointments, and call your doctor if you are having problems. It's also a good idea to know your test results and keep a list of the medicines you take. Where can you learn more? Go to http://www.gray.com/  Enter N533 in the search box to learn more about \"Learning About Catheter Ablation for Heart Rhythm Problems. \"  Current as of: December 16, 2019               Content Version: 12.6  © 0053-5713 [employer] Care instructions adapted under license by [employer] (which disclaims liability or warranty for this information). If you have questions about a medical condition or this instruction, always ask your healthcare professional. [place] [address] any warranty or liability for your use of this information. Electrophysiology Study (EPS)/Cardiac Ablation   Discharge Instructions      You have just had a Cardiac Ablation for: atrial flutter. There were catheters temporarily placed in your heart through a puncture in the Veins and/or Arteries in your groin. WHAT TO EXPECT      If you have had a Cardiac Ablation please be aware that you may experience mild chest pain that will resolve within 24-48 hours. If the Chest Pain begins radiating down your shoulders or arms, becomes severe or breathing becomes difficult, call 911 or go to the closest emergency room.  Mild to moderate, non-painful, bruising or mild swelling at the puncture site is not un-common, and will resolve in 7 - 14 days, and may extend down your thigh as it heals.  If a closure device was used to seal your artery or vein, a separate pamphlet will be given to you with these discharge instructions. It is very important that you review the information in the pamphlet for different restrictions and precautions.  You have a small gauze dressing applied to the puncture site in your groin.   You may remove this the following morning.  You MAY receive a 30 day heart monitor in the mail to wear after your procedure. This is to evaluate your heart rhythm post ablation. MEDICATIONS      Take only the medications prescribed to you at discharge. ACTIVITY      A responsible adult must take you home. Do not drive a car for 24 hours.  Rest quietly for the remainder of the day.  Do not lift anything greater than 10 pounds for 3 days.  Limit bending at the puncture site and use of stairs for at least 3 days.  You may remove the bandage and shower the morning after the procedure. Do not take a bath for 3 days. SYMPTOMS THAT NEED TO BE REPORTED IMMEDIATELY      Bleeding at the puncture site. If there is bleeding, lie down and hold firm direct pressure for at least 5 minutes. If the bleeding does not stop, go to the closest emergency room, or call 911.  Temperature more than 100.5 F.   Redness or warmth at the puncture site.  Increasing pain, numbness, coolness or blue discoloration of the extremity where the puncture is located.  Pulsating mass at the puncture site.  A new lump at the puncture site, or increasing swelling at the site. Please be aware that a pea or marble sized lump is normal, anything larger or increasing in size should be reported.  Bruising at the puncture site that enlarges or becomes painful (some bruising at the site is common and will go away in 7-14 days).  Rapid heart rate or palpitations.  Dizziness, lightheadedness, fainting.  REMEMBER: If you feel something is an emergency or cannot be handled over the phone, call 911 or go to the closest emergency room.       FOLLOW UP CARE     Evelyn Rao NP in 2 weeks  Dr. Jacinta Rogel in 3 months        Tayler Heaton MD  Cardiac Electrophysiology / Cardiology    [place] [address].  [facility], [address] [place] [address] [address] [place] [address]    Brian Rice  [phone] / [phone] Fax   [phone] / [phone] Fax

## 2020-10-15 NOTE — PROCEDURES
Cardiac Electrophysiology Report      PATIENT INFORMATION     Patient Name: Reina Aase. MRN: [id number]              Study Date: 10/15/2020    YOB: 1950     Age: 65 y.o. Gender: male      Procedure:  Atrial Flutter AblationRa    Referring Physician:  Farzaneh Tolliver MD         STAFF     Duty Name   Electrophysiologist Henny Chao MD   Monitor Brinda [place], RCIS   Circulator Joan Blanco RN; Pascual Valencia RN       PATIENT HISTORY     Reina Aase. is a 65 y.o. male with atrial flutter s/p ED/DCCV recently now presenting for atrial flutter ablation. PROCEDURE     The patient was brought to the Cardiac Electrophysiology laboratory in a post-absorptive, fasting state. Informed consent was obtained. A peripheral IV was in place. Continuous electrocardiographic, blood pressure, O2 saturation and  CO2 monitoring was initiated. Self-adhesive cardioversion patches were positioned on the chest.  Conscious sedation was administered per protocol. The patient was then prepped and draped in the usual sterile fashion. Both groins were infiltrated with a 50/50 mixture of Lidocaine (1%) and bupivicaine (0.5%). Vascular access was obtained in the right common femoral vein using a Ramp sheath and a 7F sheath. Through these sheaths, a deflectable, decapolar catheter was advanced and positioned into the coronary sinus for left atrial pacing/recording. A comprehensive electrophysiology study was performed with catheter positioning in the RA, His, RV and coronary sinus for recording/pacing. Induction of the tachycardia was attempted with atrial burst pacing however the tachycardia could not be induced. 3D mapping was performed using the CARTO3 mapping system. An 8mm, non-irrigated tip ablation catheter was then advanced through the Ramp sheath across the tricuspid annulus.  Several applications of contiguous radiofrequency ablation were delivered across the CTI from the tricuspid annulus to the level of the inferior vena cava until bi-directional conduction block was demonstrated using differential pacing. This bi-directional block persisted following an observation period. A comprehensive electrophysiology study was performed. At the conclusion of the procedure,  all catheters and sheaths were removed  and hemostasis obtained by direct manual compression. The patient remained hemodynamically stable, tolerated the procedure well and was transferred in stable condition. There were no immediate complications encountered during the procedure. CONDUCTION INTERVALS     See attached report      FINDINGS     1. The baseline ECG revealed sinus rhythm. 2. Several contiguous applications of radiofrequency ablation were delivered across the cavo-tricuspid isthmus until bi-directional conduction block was demonstrated and persisted following an observation period. This was confirmed using differential pacing. CONCLUSIONS        1. Successful cavotricuspid isthmus ablation  2. Discontinue anticoagulation   3. Follow up in 2 weeks in EP clinic. 4. Follow up with  Jarred Hanna MD and Dr. Jeanne Pruitt as scheduled. Thank you, Jarred Hanna MD and Dr. Jeanne Pruitt for allowing me to participate in the care of this extraordinarily pleasant male.          Jony Calderon MD  Cardiac Electrophysiology / Cardiology    [address]  [address] [place] [address], [address] [place] [address], [address]  [address], [facility]  [phone] / [phone] Fax   [phone] / [phone] Fax

## 2020-10-15 NOTE — Clinical Note
TRANSFER - OUT REPORT:     Verbal report given to: BEDSIDE. Report consisted of patient's Situation, Background, Assessment and   Recommendations(SBAR). Opportunity for questions and clarification was provided. Patient transported to: [facility].

## 2020-10-19 ENCOUNTER — TELEPHONE (OUTPATIENT)
Dept: CARDIOLOGY CLINIC | Age: 70
End: 2020-10-19

## 2020-10-19 NOTE — TELEPHONE ENCOUNTER
Returned call, ID verified using two patient identifiers. Informed patient that Dr. Telly Powell has ordered a 30 day event monitor for him after his Atrial Flutter ablation done on 10/15/20. Patient is aware that monitor will be mailed to him and that it can take approximately 5-7 days to receive the monitor. Patient verbalizes understanding of all information.

## 2020-10-20 NOTE — TELEPHONE ENCOUNTER
Spoke with pt re: p.o box. Pt states he does not want monitor mailed to physical address because of a problem with mail theft in his area and he does not want the monitor stolen. Pt came to office to  monitor.

## 2020-10-29 ENCOUNTER — OFFICE VISIT (OUTPATIENT)
Dept: CARDIOLOGY CLINIC | Age: 70
End: 2020-10-29
Payer: COMMERCIAL

## 2020-10-29 VITALS
WEIGHT: 210 LBS | OXYGEN SATURATION: 98 % | BODY MASS INDEX: 26.95 KG/M2 | RESPIRATION RATE: 16 BRPM | HEART RATE: 50 BPM | SYSTOLIC BLOOD PRESSURE: 126 MMHG | HEIGHT: 74 IN | DIASTOLIC BLOOD PRESSURE: 72 MMHG

## 2020-10-29 DIAGNOSIS — I48.92 ATRIAL FLUTTER, UNSPECIFIED TYPE (HCC): Primary | ICD-10-CM

## 2020-10-29 DIAGNOSIS — Z86.79 S/P ABLATION OF ATRIAL FLUTTER: ICD-10-CM

## 2020-10-29 DIAGNOSIS — Z98.890 S/P ABLATION OF ATRIAL FLUTTER: ICD-10-CM

## 2020-10-29 DIAGNOSIS — I10 ESSENTIAL HYPERTENSION: ICD-10-CM

## 2020-10-29 PROCEDURE — 93000 ELECTROCARDIOGRAM COMPLETE: CPT | Performed by: NURSE PRACTITIONER

## 2020-10-29 PROCEDURE — 99214 OFFICE O/P EST MOD 30 MIN: CPT | Performed by: NURSE PRACTITIONER

## 2020-10-29 NOTE — PROGRESS NOTES
Room # 2  No ER or hospital visits since ablation  Denies any cardiac complaints at this time  Event monitor being delivered today    Visit Vitals  /72 (BP 1 Location: Left arm, BP Patient Position: Sitting)   Pulse (!) 50   Resp 16   Ht 6' 2\" (1.88 m)   Wt 210 lb (95.3 kg)   SpO2 98%   BMI 26.96 kg/m²

## 2020-10-29 NOTE — PROGRESS NOTES
HISTORY OF PRESENTING ILLNESS      Nela Carbajal is a 65 y.o. male with atrial flutter s/p ED/DCCV recently now presenting for atrial flutter ablation. He underwent successful cavotricuspid isthmus ablation and his anticoagulation was discontinued. EKG today shows sinus bradycardia. He denies cardiac complaints. Awaiting 30 day monitor.        PAST MEDICAL HISTORY     Past Medical History:   Diagnosis Date    Agatston CAC score, <100 06/10/2015    Coronary calcium score 4.    Arthritis     DDD (degenerative disc disease), thoracic     Degenerative arthritis of thumb     Left    Diverticulitis     Diverticulosis of colon 09/29/2016    of the descending colon; moderate    GERD (gastroesophageal reflux disease)     Incomplete RBBB     Numbness     Right hand    Polyp of ascending colon 09/29/2016    polyps (5mm) in the ascenidning colon and transverse colon    Pruritus ani     Sinus headache            PAST SURGICAL HISTORY     Past Surgical History:   Procedure Laterality Date    ABDOMEN SURGERY PROC UNLISTED  12/12    exploratory, small bowel obstruction, internal hernia    ENDOSCOPY, COLON, DIAGNOSTIC  2005    HX COLECTOMY  2005    colectomy for diverticulitis    HX COLONOSCOPY  12/13    HX OTHER SURGICAL      Removal of colon polyps    AL COMPRE ELECTROPHYSIOL XM W/LEFT ATRIAL PACNG/REC N/A 10/15/2020    Lt Atrial Pace & Record During Ep Study performed by Danilo Carlisle MD at [address] CATH LAB    AL EPHYS EVAL W/ABLATION SUPRAVENT ARRHYTHMIA N/A 10/15/2020    ABLATION A-FLUTTER performed by Danilo Carlisle MD at [address] CATH LAB    AL INTRACARDIAC ELECTROPHYSIOLOGIC 3D MAPPING N/A 10/15/2020    EP 3D MAPPING performed by Danilo Carlisle MD at [address] CATH LAB          ALLERGIES     Allergies   Allergen Reactions    Ciprofloxacin Other (comments)     \"Joint pain\"    Naproxen Nausea Only     Patient was overdose, GI side effects          FAMILY HISTORY Family History   Problem Relation Age of Onset    Stroke Mother     Heart Disease Father     negative for cardiac disease       SOCIAL HISTORY     Social History     Socioeconomic History    Marital status:      Spouse name: Not on file    Number of children: Not on file    Years of education: Not on file    Highest education level: Not on file   Tobacco Use    Smoking status: Never Smoker    Smokeless tobacco: Never Used   Substance and Sexual Activity    Alcohol use: Yes     Comment: rarely    Drug use: No         MEDICATIONS     Current Outpatient Medications   Medication Sig    lisinopril (PRINIVIL, ZESTRIL) 5 mg tablet Take 1 Tab by mouth daily.  multivitamin (ONE A DAY) tablet Take 1 Tab by mouth daily.  psyllium (METAMUCIL) packet Take 1 Packet by mouth two (2) times a day. No current facility-administered medications for this visit. I have reviewed the nurses notes, vitals, problem list, allergy list, medical history, family, social history and medications. REVIEW OF SYMPTOMS      General: Pt denies excessive weight gain or loss. Pt is able to conduct ADL's  HEENT: Denies blurred vision, headaches, hearing loss, epistaxis and difficulty swallowing. Respiratory: Denies cough, congestion, shortness of breath, BELTRAN, wheezing or stridor. Cardiovascular: Denies precordial pain, palpitations, edema or PND  Gastrointestinal: Denies poor appetite, indigestion, abdominal pain or blood in stool  Genitourinary: Denies hematuria, dysuria, increased urinary frequency  Musculoskeletal: Denies joint pain or swelling from muscles or joints  Neurologic: Denies tremor, paresthesias, headache, or sensory motor disturbance  Psychiatric: Denies confusion, insomnia, depression  Integumentray: Denies rash, itching or ulcers. Hematologic: Denies easy bruising, bleeding       PHYSICAL EXAMINATION      Vitals: see vitals section  General: Well developed, in no acute distress.   HEENT: No jaundice, oral mucosa moist, no oral ulcers  Neck: Supple, no stiffness, no lymphadenopathy, supple  Heart:  Normal S1/S2 negative S3 or S4. Regular, no murmur, gallop or rub, no jugular venous distention  Respiratory: Clear bilaterally x 4, no wheezing or rales  Abdomen:   Soft, non-tender, bowel sounds are active. Extremities:  No edema, normal cap refill, no cyanosis. Musculoskeletal: No clubbing, no deformities  Neuro: A&Ox3, speech clear, gait stable, cooperative, no focal neurologic deficits  Skin: Skin color is normal. No rashes or lesions. Non diaphoretic, moist.  Vascular: 2+ pulses symmetric in all extremities       DIAGNOSTIC DATA      EKG: sinus bradycardia        LABORATORY DATA      Lab Results   Component Value Date/Time    WBC 6.7 10/12/2020 08:28 AM    HGB 13.7 10/12/2020 08:28 AM    HCT 42.5 10/12/2020 08:28 AM    PLATELET 767 76/72/3718 08:28 AM    MCV 84 10/12/2020 08:28 AM      Lab Results   Component Value Date/Time    Sodium 141 10/12/2020 08:28 AM    Potassium 4.5 10/12/2020 08:28 AM    Chloride 108 (H) 10/12/2020 08:28 AM    CO2 18 (L) 10/12/2020 08:28 AM    Anion gap 4 11/25/2015 08:24 AM    Glucose 98 10/12/2020 08:28 AM    BUN 20 10/12/2020 08:28 AM    Creatinine 0.85 10/12/2020 08:28 AM    BUN/Creatinine ratio 24 10/12/2020 08:28 AM    GFR est  10/12/2020 08:28 AM    GFR est non-AA 87 10/12/2020 08:28 AM    Calcium 9.7 10/12/2020 08:28 AM    Bilirubin, total 0.3 11/25/2015 08:24 AM    Alk. phosphatase 86 11/25/2015 08:24 AM    Protein, total 6.1 (L) 11/25/2015 08:24 AM    Albumin 3.5 11/25/2015 08:24 AM    Globulin 2.6 11/25/2015 08:24 AM    A-G Ratio 1.3 11/25/2015 08:24 AM    ALT (SGPT) 37 11/25/2015 08:24 AM           ASSESSMENT      1. Atrial flutter    S/p ablation  2. Hypertension  3. DG  4. GERD     PLAN     Continue to monitor for recurrent arrhythmia. ICD-10-CM ICD-9-CM    1.  Atrial flutter, unspecified type (HCC)  I48.92 427.32 AMB POC EKG ROUTINE W/ 12 LEADS, INTER & REP   2. S/P ablation of atrial flutter  Z98.890 V45.89 AMB POC EKG ROUTINE W/ 12 LEADS, INTER & REP    Z86.79     3. Essential hypertension  I10 401.9 AMB POC EKG ROUTINE W/ 12 LEADS, INTER & REP     Orders Placed This Encounter    AMB POC EKG ROUTINE W/ 12 LEADS, INTER & REP     Order Specific Question:   Reason for Exam:     Answer:   irregular heart beat          FOLLOW-UP   3 months as planned    Thank you, Linda Magallanes MD and Dr. Yessenia Curiel for allowing me to participate in the care of this extraordinarily pleasant male. Please do not hesitate to contact me for further questions/concerns.        STEPHANIE Murrell [facility].  [address] [place] [address]  [phone] / [phone] Fax   [phone] / [phone] Fax

## 2021-01-19 NOTE — PROGRESS NOTES
HISTORY OF PRESENTING ILLNESS      Cindy Carranza is a 65 y.o. male with atrial flutter s/p ED/DCCV recently now presenting for atrial flutter ablation. He underwent successful cavotricuspid isthmus ablation and his anticoagulation was discontinued. 30 day monitor showed at least 2 possible episodes of AF (one with RVR), NSVT. All asymptomatic.         PAST MEDICAL HISTORY     Past Medical History:   Diagnosis Date    Agatston CAC score, <100 06/10/2015    Coronary calcium score 4.    Arthritis     DDD (degenerative disc disease), thoracic     Degenerative arthritis of thumb     Left    Diverticulitis     Diverticulosis of colon 09/29/2016    of the descending colon; moderate    GERD (gastroesophageal reflux disease)     Incomplete RBBB     Numbness     Right hand    Polyp of ascending colon 09/29/2016    polyps (5mm) in the ascenidning colon and transverse colon    Pruritus ani     Sinus headache            PAST SURGICAL HISTORY     Past Surgical History:   Procedure Laterality Date    ENDOSCOPY, COLON, DIAGNOSTIC  2005    HX COLECTOMY  2005    colectomy for diverticulitis    HX COLONOSCOPY  12/13    HX OTHER SURGICAL      Removal of colon polyps    TX ABDOMEN SURGERY PROC UNLISTED  12/12    exploratory, small bowel obstruction, internal hernia    TX COMPRE ELECTROPHYSIOL XM W/LEFT ATRIAL PACNG/REC N/A 10/15/2020    Lt Atrial Pace & Record During Ep Study performed by Timothy Do MD at [address] CATH LAB    TX EPHYS EVAL W/ABLATION SUPRAVENT ARRHYTHMIA N/A 10/15/2020    ABLATION A-FLUTTER performed by Timothy Do MD at [address] CATH LAB    TX INTRACARDIAC ELECTROPHYSIOLOGIC 3D MAPPING N/A 10/15/2020    EP 3D MAPPING performed by Timothy Do MD at [address] CATH LAB          ALLERGIES     Allergies   Allergen Reactions    Ciprofloxacin Other (comments)     \"Joint pain\"    Naproxen Nausea Only     Patient was overdose, GI side effects          FAMILY HISTORY     Family History   Problem Relation Age of Onset    Stroke Mother     Heart Disease Father     negative for cardiac disease       SOCIAL HISTORY     Social History     Socioeconomic History    Marital status:      Spouse name: Not on file    Number of children: Not on file    Years of education: Not on file    Highest education level: Not on file   Tobacco Use    Smoking status: Never Smoker    Smokeless tobacco: Never Used   Substance and Sexual Activity    Alcohol use: Yes     Comment: rarely    Drug use: No         MEDICATIONS     Current Outpatient Medications   Medication Sig    lisinopril (PRINIVIL, ZESTRIL) 5 mg tablet Take 1 Tab by mouth daily.  multivitamin (ONE A DAY) tablet Take 1 Tab by mouth daily.  psyllium (METAMUCIL) packet Take 1 Packet by mouth two (2) times a day. No current facility-administered medications for this visit. I have reviewed the nurses notes, vitals, problem list, allergy list, medical history, family, social history and medications. REVIEW OF SYMPTOMS      General: Pt denies excessive weight gain or loss. Pt is able to conduct ADL's  HEENT: Denies blurred vision, headaches, hearing loss, epistaxis and difficulty swallowing. Respiratory: Denies cough, congestion, shortness of breath, BELTRAN, wheezing or stridor. Cardiovascular: Denies precordial pain, palpitations, edema or PND  Gastrointestinal: Denies poor appetite, indigestion, abdominal pain or blood in stool  Genitourinary: Denies hematuria, dysuria, increased urinary frequency  Musculoskeletal: Denies joint pain or swelling from muscles or joints  Neurologic: Denies tremor, paresthesias, headache, or sensory motor disturbance  Psychiatric: Denies confusion, insomnia, depression  Integumentray: Denies rash, itching or ulcers.   Hematologic: Denies easy bruising, bleeding       PHYSICAL EXAMINATION      Vitals: see vitals section  General: Well developed, in no acute distress. HEENT: No jaundice, oral mucosa moist, no oral ulcers  Neck: Supple, no stiffness, no lymphadenopathy, supple  Heart:  Normal S1/S2 negative S3 or S4. Regular, no murmur, gallop or rub, no jugular venous distention  Respiratory: Clear bilaterally x 4, no wheezing or rales  Abdomen:   Soft, non-tender, bowel sounds are active. Extremities:  No edema, normal cap refill, no cyanosis. Musculoskeletal: No clubbing, no deformities  Neuro: A&Ox3, speech clear, gait stable, cooperative, no focal neurologic deficits  Skin: Skin color is normal. No rashes or lesions. Non diaphoretic, moist.  Vascular: 2+ pulses symmetric in all extremities       DIAGNOSTIC DATA      EKG:        LABORATORY DATA      Lab Results   Component Value Date/Time    WBC 6.7 10/12/2020 08:28 AM    HGB 13.7 10/12/2020 08:28 AM    HCT 42.5 10/12/2020 08:28 AM    PLATELET 440 54/03/7077 08:28 AM    MCV 84 10/12/2020 08:28 AM      Lab Results   Component Value Date/Time    Sodium 141 10/12/2020 08:28 AM    Potassium 4.5 10/12/2020 08:28 AM    Chloride 108 (H) 10/12/2020 08:28 AM    CO2 18 (L) 10/12/2020 08:28 AM    Anion gap 4 11/25/2015 08:24 AM    Glucose 98 10/12/2020 08:28 AM    BUN 20 10/12/2020 08:28 AM    Creatinine 0.85 10/12/2020 08:28 AM    BUN/Creatinine ratio 24 10/12/2020 08:28 AM    GFR est  10/12/2020 08:28 AM    GFR est non-AA 87 10/12/2020 08:28 AM    Calcium 9.7 10/12/2020 08:28 AM    Bilirubin, total 0.3 11/25/2015 08:24 AM    Alk. phosphatase 86 11/25/2015 08:24 AM    Protein, total 6.1 (L) 11/25/2015 08:24 AM    Albumin 3.5 11/25/2015 08:24 AM    Globulin 2.6 11/25/2015 08:24 AM    A-G Ratio 1.3 11/25/2015 08:24 AM    ALT (SGPT) 37 11/25/2015 08:24 AM           ASSESSMENT      1. Atrial flutter                        S/p ablation  2. Hypertension? 3. DG  4. GERD  5. Atrial fibrillation - sustained? [place]? 6. Diverticulosis/diverticulitis   A.  S/p partial colectomy        PLAN     He is adamant that he does not have hypertension. May consider holding lisinopril and if BP elevated, would restart lisinopril and xarelto. If remains normotensive, acceptable to remain off lisinopril and start aspirin 81 mg daily rather than NOAC. Discussed ILR vs iWATCH to monitor AF burden. ICD-10-CM ICD-9-CM    1. Atrial flutter, unspecified type ([address]  I48.92 427.32    2. S/P ablation of atrial flutter  Z98.890 V45.89     Z86.79     3. Essential hypertension  I10 401.9    4. Incomplete RBBB  I45.10 426.4      No orders of the defined types were placed in this encounter. FOLLOW-UP     6 months or earlier if he opts for ILR      Thank you, Caty Arteaga MD and Dr. Kimberly Keita for allowing me to participate in the care of this extraordinarily pleasant male. Please do not hesitate to contact me for further questions/concerns.          Russel Padron MD  Cardiac Electrophysiology / Cardiology    [address]  [address] [place] [address]  [phone] / [phone] Fax   [phone] / [phone] Fax

## 2021-01-20 ENCOUNTER — OFFICE VISIT (OUTPATIENT)
Dept: CARDIOLOGY CLINIC | Age: 71
End: 2021-01-20
Payer: COMMERCIAL

## 2021-01-20 VITALS
WEIGHT: 219.6 LBS | BODY MASS INDEX: 28.18 KG/M2 | HEIGHT: 74 IN | HEART RATE: 46 BPM | DIASTOLIC BLOOD PRESSURE: 72 MMHG | OXYGEN SATURATION: 97 % | RESPIRATION RATE: 16 BRPM | SYSTOLIC BLOOD PRESSURE: 148 MMHG

## 2021-01-20 DIAGNOSIS — Z98.890 S/P ABLATION OF ATRIAL FLUTTER: ICD-10-CM

## 2021-01-20 DIAGNOSIS — Z86.79 S/P ABLATION OF ATRIAL FLUTTER: ICD-10-CM

## 2021-01-20 DIAGNOSIS — I45.10 INCOMPLETE RBBB: ICD-10-CM

## 2021-01-20 DIAGNOSIS — I48.92 ATRIAL FLUTTER, UNSPECIFIED TYPE (HCC): Primary | ICD-10-CM

## 2021-01-20 DIAGNOSIS — I10 ESSENTIAL HYPERTENSION: ICD-10-CM

## 2021-01-20 PROCEDURE — 99215 OFFICE O/P EST HI 40 MIN: CPT | Performed by: INTERNAL MEDICINE

## 2021-01-20 NOTE — PROGRESS NOTES
Room # 2    Event monitor: 10/31-11/29/20    Chest pain: no  Shortness of breath: yes with exertion  Edema: no  Palpitations, Skipped beats, Rapid heartbeat: no  Dizziness: one episode in November while wearing the monitor  Fatigue: no   New diagnosis/Surgeries: no    ER/Hospitalizations: no    Refills:no    Visit Vitals  BP (!) 148/72 (BP 1 Location: Left arm, BP Patient Position: Sitting)   Pulse (!) 46   Resp 16   Ht 6' 2\" (1.88 m)   Wt 219 lb 9.6 oz (99.6 kg) Comment: wearing work boots   SpO2 97%   BMI 28.19 kg/m²

## 2021-01-22 ENCOUNTER — DOCUMENTATION ONLY (OUTPATIENT)
Dept: CARDIOLOGY CLINIC | Age: 71
End: 2021-01-22

## 2021-01-22 NOTE — PROGRESS NOTES
Case discussed with Dr. Jackson. Patient has hypertension and therefore his CHADS score is 2. He is at elevated risk for bleeding complications. Will start aspirin 81 mg daily for now however plan for WATCHMAN. Dr. Jackson felt it would be acceptable to be on NOAC post WATCHMAN. He underwent ED in the past; will utilize images from that ED for sizing purposes.     Plan for WATCHMAN with general anesthesia at [facility].       Silvino Naranjo MD

## 2021-01-26 ENCOUNTER — TELEPHONE (OUTPATIENT)
Dept: CARDIOLOGY CLINIC | Age: 71
End: 2021-01-26

## 2021-01-26 DIAGNOSIS — I48.91 ATRIAL FIBRILLATION, UNSPECIFIED TYPE (HCC): Primary | ICD-10-CM

## 2021-01-26 NOTE — TELEPHONE ENCOUNTER
Spoke with patient regarding Watchman procedure recommendations per Dr. Elizabeth Gibbs and Dr. John Muñoz. Patient in agreement for 3/2/2021 procedure at 10:00 at [facility] with general anesthesia. Will arrange for CTA prior, as well as office visit for H&P with Dr. Elizabeth Gibbs prior to procedure.     Isma Lima NP

## 2021-02-01 ENCOUNTER — TELEPHONE (OUTPATIENT)
Dept: CARDIOLOGY CLINIC | Age: 71
End: 2021-02-01

## 2021-02-01 NOTE — TELEPHONE ENCOUNTER
Patient is calling to speak with Ellie Rudd NP. He stated that he would like to update her on a medication issue he was having. Please advise.     Phone #: [phone]  Thanks

## 2021-02-01 NOTE — TELEPHONE ENCOUNTER
Returned patient call, ID verified using two patient identifiers. Patient calling because he wanted to let Paige Thorpe NP know that he called his pharmacy and they stated they never previously declined his Xarelto. He wanted to make sure she was aware so that when she prescribes the Xarelto post Watchman procedure there should be no difficulties. Clarified medication plans post precedure with Paige Thorpe NP that patient will be on Xarelto 45 days and Plavix for 6 months. Patient verbalized understanding and will call with any other questions.

## 2021-02-08 ENCOUNTER — PREP FOR PROCEDURE (OUTPATIENT)
Dept: CARDIAC CATH/INVASIVE PROCEDURES | Age: 71
End: 2021-02-08

## 2021-02-08 RX ORDER — SODIUM CHLORIDE 0.9 % (FLUSH) 0.9 %
5-40 SYRINGE (ML) INJECTION AS NEEDED
Status: CANCELLED | OUTPATIENT
Start: 2021-02-08

## 2021-02-08 RX ORDER — SODIUM CHLORIDE 0.9 % (FLUSH) 0.9 %
5-40 SYRINGE (ML) INJECTION EVERY 8 HOURS
Status: CANCELLED | OUTPATIENT
Start: 2021-02-08

## 2021-02-09 ENCOUNTER — TELEPHONE (OUTPATIENT)
Dept: CARDIOLOGY CLINIC | Age: 71
End: 2021-02-09

## 2021-02-09 NOTE — TELEPHONE ENCOUNTER
Dino from [facility] calling to verify who should obtain authorization for patient's cta on Friday.      Phone: [phone]

## 2021-02-09 NOTE — PROGRESS NOTES
HISTORY OF PRESENTING ILLNESS      Reagan Yu is a 65 y.o. male with atrial flutter s/p ED/DCCV recently now presenting for atrial flutter ablation. He underwent successful cavotricuspid isthmus ablation and his anticoagulation was discontinued. 30 day monitor showed at least 2 possible episodes of AF (one with RVR), NSVT. All asymptomatic. The case was discussed with Dr. Tejas Marquez. Patient has hypertension and therefore his CHADS score is 2. He is at elevated risk for bleeding complications. Will start aspirin 81 mg daily for now however plan for WATCHMAN. Dr. Tejas Marquez felt it would be acceptable to be on NOAC post WATCHMAN. He has been experiencing chest discomfort and left arm discomfort that has been exertional. Began a few days after our last visit however reports he has had a recent stress test (<1 year) at Dr. Bre Cruz office.          PAST MEDICAL HISTORY     Past Medical History:   Diagnosis Date    Agatston CAC score, <100 06/10/2015    Coronary calcium score 4.    Arthritis     DDD (degenerative disc disease), thoracic     Degenerative arthritis of thumb     Left    Diverticulitis     Diverticulosis of colon 09/29/2016    of the descending colon; moderate    GERD (gastroesophageal reflux disease)     Incomplete RBBB     Numbness     Right hand    Polyp of ascending colon 09/29/2016    polyps (5mm) in the ascenidning colon and transverse colon    Pruritus ani     Sinus headache            PAST SURGICAL HISTORY     Past Surgical History:   Procedure Laterality Date    ENDOSCOPY, COLON, DIAGNOSTIC  2005    HX COLECTOMY  2005    colectomy for diverticulitis    HX COLONOSCOPY  12/13    HX OTHER SURGICAL      Removal of colon polyps    ND ABDOMEN SURGERY PROC UNLISTED  12/12    exploratory, small bowel obstruction, internal hernia    ND COMPRE ELECTROPHYSIOL XM W/LEFT ATRIAL PACNG/REC N/A 10/15/2020    Lt Atrial Pace & Record During Ep Study performed by Ottoniel Chong Everardo Chacon MD at [address] CATH LAB    [place] EPHYS EVAL W/ABLATION [address] N/A 10/15/2020    ABLATION A-FLUTTER performed by Haleigh Pascual MD at [facility] CATH LAB    WI INTRACARDIAC ELECTROPHYSIOLOGIC 3D MAPPING N/A 10/15/2020    EP 3D MAPPING performed by Haleigh Pascual MD at [address] CATH LAB          ALLERGIES     Allergies   Allergen Reactions    Ciprofloxacin Other (comments)     \"Joint pain\"    Naproxen Nausea Only     Patient was overdose, GI side effects          FAMILY HISTORY     Family History   Problem Relation Age of Onset    Stroke Mother     Heart Disease Father     negative for cardiac disease       SOCIAL HISTORY     Social History     Socioeconomic History    Marital status:      Spouse name: Not on file    Number of children: Not on file    Years of education: Not on file    Highest education level: Not on file   Tobacco Use    Smoking status: Never Smoker    Smokeless tobacco: Never Used   Substance and Sexual Activity    Alcohol use: Yes     Alcohol/week: 3.0 standard drinks     Types: 3 Cans of beer per week    Drug use: No         MEDICATIONS     Current Outpatient Medications   Medication Sig    aspirin delayed-release 81 mg tablet Take 81 mg by mouth daily.  lisinopril (PRINIVIL, ZESTRIL) 5 mg tablet Take 1 Tab by mouth daily. (Patient taking differently: Take 10 mg by mouth daily.)    multivitamin (ONE A DAY) tablet Take 1 Tab by mouth daily.  psyllium (METAMUCIL) packet Take 1 Packet by mouth two (2) times a day. No current facility-administered medications for this visit. I have reviewed the nurses notes, vitals, problem list, allergy list, medical history, family, social history and medications. REVIEW OF SYMPTOMS      General: Pt denies excessive weight gain or loss. Pt is able to conduct ADL's  HEENT: Denies blurred vision, headaches, hearing loss, epistaxis and difficulty swallowing.   Respiratory: Denies cough, congestion, shortness of breath, BELTRAN, wheezing or stridor. Cardiovascular: Denies precordial pain, palpitations, edema or PND  Gastrointestinal: Denies poor appetite, indigestion, abdominal pain or blood in stool  Genitourinary: Denies hematuria, dysuria, increased urinary frequency  Musculoskeletal: Denies joint pain or swelling from muscles or joints  Neurologic: Denies tremor, paresthesias, headache, or sensory motor disturbance  Psychiatric: Denies confusion, insomnia, depression  Integumentray: Denies rash, itching or ulcers. Hematologic: Denies easy bruising, bleeding       PHYSICAL EXAMINATION      Vitals: see vitals section  General: Well developed, in no acute distress. HEENT: No jaundice, oral mucosa moist, no oral ulcers  Neck: Supple, no stiffness, no lymphadenopathy, supple  Heart:  Normal S1/S2 negative S3 or S4. Regular, no murmur, gallop or rub, no jugular venous distention  Respiratory: Clear bilaterally x 4, no wheezing or rales  Abdomen:   Soft, non-tender, bowel sounds are active. Extremities:  No edema, normal cap refill, no cyanosis. Musculoskeletal: No clubbing, no deformities  Neuro: A&Ox3, speech clear, gait stable, cooperative, no focal neurologic deficits  Skin: Skin color is normal. No rashes or lesions.  Non diaphoretic, moist.  Vascular: 2+ pulses symmetric in all extremities       DIAGNOSTIC DATA      EKG:        LABORATORY DATA      Lab Results   Component Value Date/Time    WBC 6.7 10/12/2020 08:28 AM    HGB 13.7 10/12/2020 08:28 AM    HCT 42.5 10/12/2020 08:28 AM    PLATELET 506 68/66/4235 08:28 AM    MCV 84 10/12/2020 08:28 AM      Lab Results   Component Value Date/Time    Sodium 141 10/12/2020 08:28 AM    Potassium 4.5 10/12/2020 08:28 AM    Chloride 108 (H) 10/12/2020 08:28 AM    CO2 18 (L) 10/12/2020 08:28 AM    Anion gap 4 11/25/2015 08:24 AM    Glucose 98 10/12/2020 08:28 AM    BUN 20 10/12/2020 08:28 AM    Creatinine 0.85 10/12/2020 08:28 AM    BUN/Creatinine ratio 24 10/12/2020 08:28 AM    GFR est  10/12/2020 08:28 AM    GFR est non-AA 87 10/12/2020 08:28 AM    Calcium 9.7 10/12/2020 08:28 AM    Bilirubin, total 0.3 11/25/2015 08:24 AM    Alk. phosphatase 86 11/25/2015 08:24 AM    Protein, total 6.1 (L) 11/25/2015 08:24 AM    Albumin 3.5 11/25/2015 08:24 AM    Globulin 2.6 11/25/2015 08:24 AM    A-G Ratio 1.3 11/25/2015 08:24 AM    ALT (SGPT) 37 11/25/2015 08:24 AM           ASSESSMENT      1. Atrial flutter                        S/p ablation  2. Hypertension? 3. DG  4. GERD  5. Atrial fibrillation - sustained? [place]? 6. Diverticulosis/diverticulitis             A. S/p partial colectomy          PLAN     The patient has a CHADS 2 score of 2. He should avoid long term anticoagulation past/current medical history of 2. The patient feels strongly that anticoagulation and documented stroke risk greatly impacts his/her quality of life. The patient is a candidate for Watchman, a left atrial appendage closure (LAAC) device to reduce risk of thromboembolism. The patient has need for anticoagulation and is considered a high risk for stroke, but has a relative contraindication for long term anticoagulation. The patient is suitable for short term warfarin but deemed unable to take long term oral anticoagulation following the conclusion of shared decision making interaction with the patient. I have discussed at length the risks/benefits and alternatives of this procedure with regards to stroke risk versus bleeding risk. The patient understands that anticoagulation will be maintained in a variety of forms during the first year and agrees with plan. Risks include but not limited to: infection, bleeding, vessel injury, cardiac perforation at times requiring drainage or surgery, heart failure, migration of the device, emergency surgery, myocardial infarction, stroke and death.     Based on both stroke and bleeding risk, a shared decision has been made to pursue closure of left atrial appendage as a safe and effective alternative to oral anticoagulant therapy for stroke prophylaxis and to reduce his/her long term risk of bleeding. ICD-10-CM ICD-9-CM    1. Atrial fibrillation, unspecified type (HCC)  I48.91 427.31 CBC W/O DIFF      METABOLIC PANEL, BASIC      NOVEL CORONAVIRUS (COVID-19)      PROTHROMBIN TIME + INR      URINALYSIS W/ RFLX MICROSCOPIC      AMB POC EKG ROUTINE W/ 12 LEADS, INTER & REP   2. Atrial flutter, unspecified type (HCC)  I48.92 427.32 CBC W/O DIFF      METABOLIC PANEL, BASIC      NOVEL CORONAVIRUS (COVID-19)      PROTHROMBIN TIME + INR      URINALYSIS W/ RFLX MICROSCOPIC      AMB POC EKG ROUTINE W/ 12 LEADS, INTER & REP   3. S/P ablation of atrial flutter  Z98.890 V45.89 CBC W/O DIFF    Y05.22  METABOLIC PANEL, BASIC      NOVEL CORONAVIRUS (COVID-19)      PROTHROMBIN TIME + INR      URINALYSIS W/ RFLX MICROSCOPIC      AMB POC EKG ROUTINE W/ 12 LEADS, INTER & REP   4. Essential hypertension  I10 401.9 CBC W/O DIFF      METABOLIC PANEL, BASIC      NOVEL CORONAVIRUS (COVID-19)      PROTHROMBIN TIME + INR      URINALYSIS W/ RFLX MICROSCOPIC      AMB POC EKG ROUTINE W/ 12 LEADS, INTER & REP   5.  Pre-procedure lab exam  Z01.812 V72.63 CBC W/O DIFF      METABOLIC PANEL, BASIC      NOVEL CORONAVIRUS (COVID-19)      PROTHROMBIN TIME + INR      URINALYSIS W/ RFLX MICROSCOPIC      AMB POC EKG ROUTINE W/ 12 LEADS, INTER & REP     Orders Placed This Encounter    CBC W/O DIFF     Standing Status:   Future     Standing Expiration Date:   8/21/2830    METABOLIC PANEL, BASIC     Standing Status:   Future     Standing Expiration Date:   2/10/2022    NOVEL CORONAVIRUS (COVID-19)     Scheduling Instructions:      1) Due to current limited availability of the COVID-19 PCR test, tests will be prioritized and may not be completed.              2) Order only if the test result will change clinical management or necessary for a return to mission-critical employment decision.              3) Print and instruct patient to adhere to CDC home isolation program. (Link Above)              4) Set up or refer patient for a monitoring program.              5) Have patient sign up for and leverage MyChart (if not previously done). Order Specific Question:   Is this test for diagnosis or screening? Answer:   Screening     Order Specific Question:   Symptomatic for COVID-19 as defined by CDC? Answer:   No     Order Specific Question:   Hospitalized for COVID-19? Answer:   No     Order Specific Question:   Admitted to ICU for COVID-19? Answer:   No     Order Specific Question:   Employed in healthcare setting? Answer:   Unknown     Order Specific Question:   Resident in a congregate (group) care setting? Answer:   Unknown     Order Specific Question:   Previously tested for COVID-19? Answer:   Unknown    PROTHROMBIN TIME + INR     Standing Status:   Future     Standing Expiration Date:   2/10/2022    URINALYSIS W/ RFLX MICROSCOPIC     Standing Status:   Future     Standing Expiration Date:   2/10/2022    AMB POC EKG ROUTINE W/ 12 LEADS, INTER & REP     Order Specific Question:   Reason for Exam:     Answer:   irregular heart beat    aspirin delayed-release 81 mg tablet     Sig: Take 81 mg by mouth daily. FOLLOW-UP       Thank you, Srinivasa Vee MD and Dr. Hussain Darden for allowing me to participate in the care of this extraordinarily pleasant male. Please do not hesitate to contact me for further questions/concerns.          Mnoica Guido MD  Cardiac Electrophysiology / Cardiology    [address] [place] [address]  [phone] / [phone] Fax   [phone] / [phone] Fax

## 2021-02-09 NOTE — TELEPHONE ENCOUNTER
Returned call to G[place] from [facility] regarding ordered CTA. Clarified that he is to obtain authorization for the test.  Verbalized understanding and will call back with any other questions or concerns.

## 2021-02-10 ENCOUNTER — OFFICE VISIT (OUTPATIENT)
Dept: CARDIOLOGY CLINIC | Age: 71
End: 2021-02-10
Payer: COMMERCIAL

## 2021-02-10 VITALS
HEIGHT: 74 IN | WEIGHT: 211.8 LBS | SYSTOLIC BLOOD PRESSURE: 146 MMHG | DIASTOLIC BLOOD PRESSURE: 84 MMHG | RESPIRATION RATE: 16 BRPM | HEART RATE: 52 BPM | BODY MASS INDEX: 27.18 KG/M2 | OXYGEN SATURATION: 97 %

## 2021-02-10 DIAGNOSIS — Z01.812 PRE-PROCEDURE LAB EXAM: ICD-10-CM

## 2021-02-10 DIAGNOSIS — I48.91 ATRIAL FIBRILLATION, UNSPECIFIED TYPE (HCC): Primary | ICD-10-CM

## 2021-02-10 DIAGNOSIS — I48.92 ATRIAL FLUTTER, UNSPECIFIED TYPE (HCC): ICD-10-CM

## 2021-02-10 DIAGNOSIS — Z98.890 S/P ABLATION OF ATRIAL FLUTTER: ICD-10-CM

## 2021-02-10 DIAGNOSIS — I10 ESSENTIAL HYPERTENSION: ICD-10-CM

## 2021-02-10 DIAGNOSIS — Z86.79 S/P ABLATION OF ATRIAL FLUTTER: ICD-10-CM

## 2021-02-10 PROCEDURE — 99215 OFFICE O/P EST HI 40 MIN: CPT | Performed by: INTERNAL MEDICINE

## 2021-02-10 PROCEDURE — 93000 ELECTROCARDIOGRAM COMPLETE: CPT | Performed by: INTERNAL MEDICINE

## 2021-02-10 RX ORDER — ASPIRIN 81 MG/1
81 TABLET ORAL DAILY
COMMUNITY

## 2021-02-10 NOTE — PROGRESS NOTES
Room # 3    Chest pain: aching left chest down left arm worse with activity,relieved by briefly resting  Shortness of breath: no  Edema: no  Palpitations, Skipped beats, Rapid heartbeat: no  Dizziness: no  Fatigue: no    New diagnosis/Surgeries: no    ER/Hospitalizations: no    Refills:no    Visit Vitals  BP (!) 146/84 (BP 1 Location: Left upper arm, BP Patient Position: Sitting)   Pulse (!) 52   Resp 16   Ht 6' 2\" (1.88 m)   Wt 211 lb 12.8 oz (96.1 kg)   SpO2 97%   BMI 27.19 kg/m²

## 2021-02-10 NOTE — PATIENT INSTRUCTIONS
You are scheduled for the following procedure with Dr. Delvis Ivory:  Artur Denae @ [facility] 
 
 
PLEASE be aware that your procedure time is a tentative time and subject to change due to emergency cases. Procedure date/time:    March 2, 2021  Time: 10:00 am - please arrive by 8:00 am 
 
 
 
o PRE-PROCEDURE LABS NEEDED: YES  
o Forms for labwork may be given at appointment. If not, they will be mailed to you. Labs should be completed within 5-7 days of procedure. These can be done at any [facility] or [facility] lab (one is located in [address] [place] [address]. No appointment needed). YES A COVID swab is needed 4 days prior to your procedure. o YOU MAY NEED PRE-PROCEDURE IMAGING, CHEST CTA OR CARDIAC MRI. [X]  Chest CTA - Scheduled 2/12/21 []  Cardiac MRI    (hospital [facility] scheduling to call you) Nothing to eat or drink after midnight before your procedure. ARRIVAL time:  (You will need  to be discharged home with.)  
 
o [facility] procedures: please arrive to check in on 2nd floor two hours prior to your procedure the day of your procedure. 
 
o [facility] procedures: arrive to check in on 1st floor near [facility] two hours prior to your procedure. Please review the following medication instructions: Do not stop any of your medications prior to the procedure. If you take any of the following Diabetic medications, please use as follows: 
 
[]  Glucophage/Metformin  -  Hold morning dose on day of procedure. 
 
[]  Insulin  -  Hold morning dose on day of procedure. 
 
[]  Lantus  -  Reduce dose by ½ evening before procedure. You may take all other medications as directed the morning of your procedure with a sip of water unless otherwise indicated. Please contact the office [phone] and ask for a member of Dr. Delvis Ivory' team for procedure questions. There is a physician on call for the office after hours for immediate needs.   
 
FOLLOW UP: 
 Your appointments will be made for you post procedure based off of discharge instructions. You may have driving restrictions for a short time after your procedure (usually 2-3 days). Pacemaker/ICD patients will be unable to have an MRI until 6 weeks after implant, NO dental work for 8 weeks after your implant.

## 2021-02-17 ENCOUNTER — HOSPITAL ENCOUNTER (OUTPATIENT)
Dept: CT IMAGING | Age: 71
Discharge: HOME | End: 2021-02-17
Attending: NURSE PRACTITIONER
Payer: COMMERCIAL

## 2021-02-17 DIAGNOSIS — I48.91 ATRIAL FIBRILLATION, UNSPECIFIED TYPE (HCC): ICD-10-CM

## 2021-02-17 LAB — CREAT BLD-MCNC: 1 MG/DL (ref 0.6–1.3)

## 2021-02-17 PROCEDURE — 82565 ASSAY OF CREATININE: CPT

## 2021-02-17 PROCEDURE — 74011000636 HC RX REV CODE- 636: Performed by: RADIOLOGY

## 2021-02-17 PROCEDURE — 71275 CT ANGIOGRAPHY CHEST: CPT

## 2021-02-17 RX ORDER — IODIXANOL 320 MG/ML
125 INJECTION, SOLUTION INTRAVASCULAR
Status: COMPLETED | OUTPATIENT
Start: 2021-02-17 | End: 2021-02-17

## 2021-02-17 RX ADMIN — IODIXANOL 150 ML: 320 INJECTION, SOLUTION INTRAVASCULAR at 07:35

## 2021-02-25 ENCOUNTER — TELEPHONE (OUTPATIENT)
Dept: CARDIOLOGY CLINIC | Age: 71
End: 2021-02-25

## 2021-02-25 DIAGNOSIS — Z01.812 PRE-PROCEDURE LAB EXAM: ICD-10-CM

## 2021-02-25 DIAGNOSIS — I48.91 ATRIAL FIBRILLATION, UNSPECIFIED TYPE (HCC): Primary | ICD-10-CM

## 2021-02-25 NOTE — TELEPHONE ENCOUNTER
Received call from Via Davy Xiao at [facility] lab, patient ID verified using two patient identifiers. She states patient's BMP that was drawn was not properly processed so patient will need to have it redrawn. She will contact patient. She also ask that we send patient a My Chart message notifying him that she will be contacting him and that a new order be placed for the BMP. All request completed.

## 2021-02-26 ENCOUNTER — TRANSCRIBE ORDER (OUTPATIENT)
Dept: REGISTRATION | Age: 71
End: 2021-02-26

## 2021-02-26 ENCOUNTER — HOSPITAL ENCOUNTER (OUTPATIENT)
Dept: LAB | Age: 71
Discharge: HOME | End: 2021-02-26
Payer: COMMERCIAL

## 2021-02-26 DIAGNOSIS — Z01.812 PRE-PROCEDURAL LABORATORY EXAMINATIONS: ICD-10-CM

## 2021-02-26 DIAGNOSIS — Z01.812 PRE-PROCEDURAL LABORATORY EXAMINATIONS: Primary | ICD-10-CM

## 2021-02-26 PROCEDURE — U0005 INFEC AGEN DETEC AMPLI PROBE: HCPCS

## 2021-02-27 LAB — SARS-COV-2, COV2NT: NOT DETECTED

## 2021-03-02 ENCOUNTER — HOSPITAL ENCOUNTER (INPATIENT)
Age: 71
LOS: 1 days | Discharge: HOME | DRG: 274 | End: 2021-03-03
Attending: INTERNAL MEDICINE | Admitting: INTERNAL MEDICINE
Payer: COMMERCIAL

## 2021-03-02 ENCOUNTER — ANESTHESIA EVENT (OUTPATIENT)
Dept: CARDIAC CATH/INVASIVE PROCEDURES | Age: 71
DRG: 274 | End: 2021-03-02
Payer: COMMERCIAL

## 2021-03-02 ENCOUNTER — APPOINTMENT (OUTPATIENT)
Dept: GENERAL RADIOLOGY | Age: 71
DRG: 274 | End: 2021-03-02
Attending: INTERNAL MEDICINE
Payer: COMMERCIAL

## 2021-03-02 ENCOUNTER — ANESTHESIA (OUTPATIENT)
Dept: CARDIAC CATH/INVASIVE PROCEDURES | Age: 71
DRG: 274 | End: 2021-03-02
Payer: COMMERCIAL

## 2021-03-02 DIAGNOSIS — I48.91 ATRIAL FIBRILLATION, UNSPECIFIED TYPE (HCC): ICD-10-CM

## 2021-03-02 PROBLEM — Z95.818 PRESENCE OF WATCHMAN LEFT ATRIAL APPENDAGE CLOSURE DEVICE: Status: ACTIVE | Noted: 2021-03-02

## 2021-03-02 LAB
ABO + RH BLD: NORMAL
ATRIAL RATE: 57 BPM
BLOOD GROUP ANTIBODIES SERPL: NORMAL
CALCULATED P AXIS, ECG09: 59 DEGREES
CALCULATED R AXIS, ECG10: -35 DEGREES
CALCULATED T AXIS, ECG11: 20 DEGREES
DIAGNOSIS, 93000: NORMAL
P-R INTERVAL, ECG05: 182 MS
Q-T INTERVAL, ECG07: 436 MS
QRS DURATION, ECG06: 122 MS
QTC CALCULATION (BEZET), ECG08: 424 MS
SPECIMEN EXP DATE BLD: NORMAL
VENTRICULAR RATE, ECG03: 57 BPM

## 2021-03-02 PROCEDURE — 85347 COAGULATION TIME ACTIVATED: CPT

## 2021-03-02 PROCEDURE — 77030013797 HC KT TRNSDUC PRSSR EDWD -A: Performed by: INTERNAL MEDICINE

## 2021-03-02 PROCEDURE — B246ZZ4 ULTRASONOGRAPHY OF RIGHT AND LEFT HEART, TRANSESOPHAGEAL: ICD-10-PCS | Performed by: INTERNAL MEDICINE

## 2021-03-02 PROCEDURE — 03HC33Z INSERTION OF INFUSION DEVICE INTO LEFT RADIAL ARTERY, PERCUTANEOUS APPROACH: ICD-10-PCS | Performed by: INTERNAL MEDICINE

## 2021-03-02 PROCEDURE — 77030004532 HC CATH ANGI DX IMP BSC -A: Performed by: INTERNAL MEDICINE

## 2021-03-02 PROCEDURE — 74011250636 HC RX REV CODE- 250/636: Performed by: INTERNAL MEDICINE

## 2021-03-02 PROCEDURE — C1769 GUIDE WIRE: HCPCS | Performed by: INTERNAL MEDICINE

## 2021-03-02 PROCEDURE — 36415 COLL VENOUS BLD VENIPUNCTURE: CPT

## 2021-03-02 PROCEDURE — 86901 BLOOD TYPING SEROLOGIC RH(D): CPT

## 2021-03-02 PROCEDURE — 77030020506 HC NDL TRNSPTL NRG BAYL -F: Performed by: INTERNAL MEDICINE

## 2021-03-02 PROCEDURE — C1893 INTRO/SHEATH, FIXED,NON-PEEL: HCPCS | Performed by: INTERNAL MEDICINE

## 2021-03-02 PROCEDURE — 65660000000 HC RM CCU STEPDOWN

## 2021-03-02 PROCEDURE — 74011000250 HC RX REV CODE- 250: Performed by: NURSE ANESTHETIST, CERTIFIED REGISTERED

## 2021-03-02 PROCEDURE — 02L73DK OCCLUSION OF LEFT ATRIAL APPENDAGE WITH INTRALUMINAL DEVICE, PERCUTANEOUS APPROACH: ICD-10-PCS | Performed by: INTERNAL MEDICINE

## 2021-03-02 PROCEDURE — 74011250636 HC RX REV CODE- 250/636: Performed by: NURSE ANESTHETIST, CERTIFIED REGISTERED

## 2021-03-02 PROCEDURE — 77030039046 HC PAD DEFIB RADIOTRNSPNT CNMD -B: Performed by: INTERNAL MEDICINE

## 2021-03-02 PROCEDURE — C1894 INTRO/SHEATH, NON-LASER: HCPCS | Performed by: INTERNAL MEDICINE

## 2021-03-02 PROCEDURE — 77030008684 HC TU ET CUF COVD -B: Performed by: ANESTHESIOLOGY

## 2021-03-02 PROCEDURE — 71045 X-RAY EXAM CHEST 1 VIEW: CPT

## 2021-03-02 PROCEDURE — 33340 PERQ CLSR TCAT L ATR APNDGE: CPT | Performed by: INTERNAL MEDICINE

## 2021-03-02 PROCEDURE — 77030026438 HC STYL ET INTUB CARD -A: Performed by: ANESTHESIOLOGY

## 2021-03-02 PROCEDURE — 74011250637 HC RX REV CODE- 250/637: Performed by: INTERNAL MEDICINE

## 2021-03-02 PROCEDURE — 93005 ELECTROCARDIOGRAM TRACING: CPT

## 2021-03-02 PROCEDURE — 76060000033 HC ANESTHESIA 1 TO 1.5 HR: Performed by: INTERNAL MEDICINE

## 2021-03-02 PROCEDURE — C1889 IMPLANT/INSERT DEVICE, NOC: HCPCS | Performed by: INTERNAL MEDICINE

## 2021-03-02 DEVICE — LEFT ATRIAL APPENDAGE CLOSURE DEVICE WITH DELIVERY SYSTEM
Type: IMPLANTABLE DEVICE | Status: FUNCTIONAL
Brand: WATCHMAN FLX™

## 2021-03-02 RX ORDER — HEPARIN SODIUM 1000 [USP'U]/ML
INJECTION, SOLUTION INTRAVENOUS; SUBCUTANEOUS AS NEEDED
Status: DISCONTINUED | OUTPATIENT
Start: 2021-03-02 | End: 2021-03-02 | Stop reason: HOSPADM

## 2021-03-02 RX ORDER — EPHEDRINE SULFATE/0.9% NACL/PF 50 MG/5 ML
SYRINGE (ML) INTRAVENOUS AS NEEDED
Status: DISCONTINUED | OUTPATIENT
Start: 2021-03-02 | End: 2021-03-02 | Stop reason: HOSPADM

## 2021-03-02 RX ORDER — CEFAZOLIN SODIUM 1 G/3ML
INJECTION, POWDER, FOR SOLUTION INTRAMUSCULAR; INTRAVENOUS AS NEEDED
Status: DISCONTINUED | OUTPATIENT
Start: 2021-03-02 | End: 2021-03-02 | Stop reason: HOSPADM

## 2021-03-02 RX ORDER — SUCCINYLCHOLINE CHLORIDE 20 MG/ML
INJECTION INTRAMUSCULAR; INTRAVENOUS AS NEEDED
Status: DISCONTINUED | OUTPATIENT
Start: 2021-03-02 | End: 2021-03-02 | Stop reason: HOSPADM

## 2021-03-02 RX ORDER — FENTANYL CITRATE 50 UG/ML
INJECTION, SOLUTION INTRAMUSCULAR; INTRAVENOUS AS NEEDED
Status: DISCONTINUED | OUTPATIENT
Start: 2021-03-02 | End: 2021-03-02 | Stop reason: HOSPADM

## 2021-03-02 RX ORDER — NEOSTIGMINE METHYLSULFATE 1 MG/ML
INJECTION, SOLUTION INTRAVENOUS AS NEEDED
Status: DISCONTINUED | OUTPATIENT
Start: 2021-03-02 | End: 2021-03-02 | Stop reason: HOSPADM

## 2021-03-02 RX ORDER — DEXAMETHASONE SODIUM PHOSPHATE 4 MG/ML
INJECTION, SOLUTION INTRA-ARTICULAR; INTRALESIONAL; INTRAMUSCULAR; INTRAVENOUS; SOFT TISSUE AS NEEDED
Status: DISCONTINUED | OUTPATIENT
Start: 2021-03-02 | End: 2021-03-02 | Stop reason: HOSPADM

## 2021-03-02 RX ORDER — HEPARIN SODIUM 200 [USP'U]/100ML
INJECTION, SOLUTION INTRAVENOUS
Status: COMPLETED | OUTPATIENT
Start: 2021-03-02 | End: 2021-03-02

## 2021-03-02 RX ORDER — LIDOCAINE HYDROCHLORIDE 20 MG/ML
INJECTION, SOLUTION EPIDURAL; INFILTRATION; INTRACAUDAL; PERINEURAL AS NEEDED
Status: DISCONTINUED | OUTPATIENT
Start: 2021-03-02 | End: 2021-03-02 | Stop reason: HOSPADM

## 2021-03-02 RX ORDER — ONDANSETRON 2 MG/ML
INJECTION INTRAMUSCULAR; INTRAVENOUS AS NEEDED
Status: DISCONTINUED | OUTPATIENT
Start: 2021-03-02 | End: 2021-03-02 | Stop reason: HOSPADM

## 2021-03-02 RX ORDER — PROPOFOL 10 MG/ML
INJECTION, EMULSION INTRAVENOUS AS NEEDED
Status: DISCONTINUED | OUTPATIENT
Start: 2021-03-02 | End: 2021-03-02 | Stop reason: HOSPADM

## 2021-03-02 RX ORDER — SODIUM CHLORIDE 0.9 % (FLUSH) 0.9 %
5-40 SYRINGE (ML) INJECTION AS NEEDED
Status: DISCONTINUED | OUTPATIENT
Start: 2021-03-02 | End: 2021-03-03 | Stop reason: HOSPADM

## 2021-03-02 RX ORDER — ONDANSETRON 2 MG/ML
4 INJECTION INTRAMUSCULAR; INTRAVENOUS
Status: DISCONTINUED | OUTPATIENT
Start: 2021-03-02 | End: 2021-03-03 | Stop reason: HOSPADM

## 2021-03-02 RX ORDER — ROCURONIUM BROMIDE 10 MG/ML
INJECTION, SOLUTION INTRAVENOUS AS NEEDED
Status: DISCONTINUED | OUTPATIENT
Start: 2021-03-02 | End: 2021-03-02 | Stop reason: HOSPADM

## 2021-03-02 RX ORDER — ACETAMINOPHEN 325 MG/1
650 TABLET ORAL
Status: DISCONTINUED | OUTPATIENT
Start: 2021-03-02 | End: 2021-03-03 | Stop reason: HOSPADM

## 2021-03-02 RX ORDER — SODIUM CHLORIDE 0.9 % (FLUSH) 0.9 %
5-40 SYRINGE (ML) INJECTION EVERY 8 HOURS
Status: DISCONTINUED | OUTPATIENT
Start: 2021-03-02 | End: 2021-03-03 | Stop reason: HOSPADM

## 2021-03-02 RX ORDER — GLYCOPYRROLATE 0.2 MG/ML
INJECTION INTRAMUSCULAR; INTRAVENOUS AS NEEDED
Status: DISCONTINUED | OUTPATIENT
Start: 2021-03-02 | End: 2021-03-02 | Stop reason: HOSPADM

## 2021-03-02 RX ORDER — SODIUM CHLORIDE 9 MG/ML
INJECTION, SOLUTION INTRAVENOUS
Status: DISCONTINUED | OUTPATIENT
Start: 2021-03-02 | End: 2021-03-02 | Stop reason: HOSPADM

## 2021-03-02 RX ORDER — HYDROCODONE BITARTRATE AND ACETAMINOPHEN 5; 325 MG/1; MG/1
1 TABLET ORAL
Status: DISCONTINUED | OUTPATIENT
Start: 2021-03-02 | End: 2021-03-03 | Stop reason: HOSPADM

## 2021-03-02 RX ORDER — LISINOPRIL 10 MG/1
10 TABLET ORAL DAILY
Status: DISCONTINUED | OUTPATIENT
Start: 2021-03-03 | End: 2021-03-03 | Stop reason: HOSPADM

## 2021-03-02 RX ADMIN — Medication 10 ML: at 22:00

## 2021-03-02 RX ADMIN — PROPOFOL 150 MG: 10 INJECTION, EMULSION INTRAVENOUS at 10:28

## 2021-03-02 RX ADMIN — Medication 10 ML: at 18:07

## 2021-03-02 RX ADMIN — Medication 4 MG: at 11:03

## 2021-03-02 RX ADMIN — PHENYLEPHRINE HYDROCHLORIDE 40 MCG/MIN: 10 INJECTION INTRAVENOUS at 10:35

## 2021-03-02 RX ADMIN — SODIUM CHLORIDE: 900 INJECTION, SOLUTION INTRAVENOUS at 10:18

## 2021-03-02 RX ADMIN — CEFAZOLIN 2 G: 330 INJECTION, POWDER, FOR SOLUTION INTRAMUSCULAR; INTRAVENOUS at 10:36

## 2021-03-02 RX ADMIN — SUCCINYLCHOLINE CHLORIDE 160 MG: 20 INJECTION, SOLUTION INTRAMUSCULAR; INTRAVENOUS at 10:28

## 2021-03-02 RX ADMIN — ROCURONIUM BROMIDE 10 MG: 10 SOLUTION INTRAVENOUS at 10:28

## 2021-03-02 RX ADMIN — ROCURONIUM BROMIDE 30 MG: 10 SOLUTION INTRAVENOUS at 10:37

## 2021-03-02 RX ADMIN — RIVAROXABAN 20 MG: 20 TABLET, FILM COATED ORAL at 18:07

## 2021-03-02 RX ADMIN — HEPARIN SODIUM 8000 UNITS: 1000 INJECTION, SOLUTION INTRAVENOUS; SUBCUTANEOUS at 10:48

## 2021-03-02 RX ADMIN — DEXAMETHASONE SODIUM PHOSPHATE 4 MG: 4 INJECTION, SOLUTION INTRAMUSCULAR; INTRAVENOUS at 10:37

## 2021-03-02 RX ADMIN — Medication 5 MG: at 10:50

## 2021-03-02 RX ADMIN — LIDOCAINE HYDROCHLORIDE 100 MG: 20 INJECTION, SOLUTION EPIDURAL; INFILTRATION; INTRACAUDAL; PERINEURAL at 10:28

## 2021-03-02 RX ADMIN — GLYCOPYRROLATE 0.6 MG: 0.2 INJECTION, SOLUTION INTRAMUSCULAR; INTRAVENOUS at 11:03

## 2021-03-02 RX ADMIN — ONDANSETRON HYDROCHLORIDE 4 MG: 2 INJECTION, SOLUTION INTRAMUSCULAR; INTRAVENOUS at 10:37

## 2021-03-02 RX ADMIN — FENTANYL CITRATE 100 MCG: 50 INJECTION, SOLUTION INTRAMUSCULAR; INTRAVENOUS at 10:28

## 2021-03-02 NOTE — ROUTINE PROCESS
Cardiac Cath Lab Recovery Arrival Note: 
 
 
Claudeen Osmond. arrived to Cardiac Cath Lab, Recovery Area. Staff introduced to patient. Patient identifiers verified with NAME and DATE OF BIRTH. Procedure verified with patient. Consent forms reviewed and signed by patient or authorized representative and verified. Allergies verified. Patient informed of procedure and plan of care. Questions answered with review. Patient prepped for procedure, per orders from physician, prior to arrival. 
 
Patient on cardiac monitor, non-invasive blood pressure, SPO2 monitor. Patient is A&Ox 4. Patient reports no pain, no chest pain, no n/v. Patient in stretcher, in low position, with side rails up, call bell within reach, patient instructed to call of assistance as needed. Patient prep in: [facility] Recovery Area, Bed# 2. Family in: Wife: Israel Zaragoza [phone].   
Prep by: Hillary Brumfield RN

## 2021-03-02 NOTE — PROGRESS NOTES
1445: TRANSFER - IN REPORT:    Verbal report received from Omayra Craig (name) on Jovi Rousseau.  being received from cath lab (unit) for routine post - op      Report consisted of patients Situation, Background, Assessment and   Recommendations(SBAR). Information from the following report(s) SBAR, Kardex, Intake/Output, MAR, Accordion, Recent Results and Cardiac Rhythm sinus barbara was reviewed with the receiving nurse. Opportunity for questions and clarification was provided. Assessment completed upon patients arrival to unit and care assumed. 1520: Patient appearing to have multiple pauses on cardiac monitor, dropping HR into 40s. Patient asymptomatic. /68. Attempting to capture on EKG. 1547: Paged Dr. Frank Garibay regarding cardiac rhythm. 1625: Re-paging Dr. Frank Garibay d/t no callback. 1645: Informed by Rafa Salas RN, that per Frank Garibay MD, continue to monitor patient for pauses. Inform MD if patient becomes symptomatic or a pause <3 second. 1930: Bedside shift change report given to [address] [place] [address] Phuong (oncoming nurse) by Janel Corcoran RN (offgoing nurse). Report included the following information SBAR, Kardex, Intake/Output, MAR, Accordion, Recent Results and Cardiac Rhythm sinus barbara with occasional pauses.

## 2021-03-02 NOTE — ANESTHESIA PREPROCEDURE EVALUATION
Relevant Problems   No relevant active problems       Anesthetic History   No history of anesthetic complications            Review of Systems / Medical History  Patient summary reviewed, nursing notes reviewed and pertinent labs reviewed    Pulmonary        Sleep apnea           Neuro/Psych   Within defined limits           Cardiovascular  Within defined limits                Exercise tolerance: >4 METS     GI/Hepatic/Renal     GERD           Endo/Other        Arthritis     Other Findings              Physical Exam    Airway  Mallampati: II  TM Distance: > 6 cm  Neck ROM: normal range of motion   Mouth opening: Normal     Cardiovascular  Regular rate and rhythm,  S1 and S2 normal,  no murmur, click, rub, or gallop             Dental  No notable dental hx       Pulmonary  Breath sounds clear to auscultation               Abdominal  GI exam deferred       Other Findings            Anesthetic Plan    ASA: 2  Anesthesia type: general    Monitoring Plan: Arterial line      Induction: Intravenous  Anesthetic plan and risks discussed with: Patient

## 2021-03-02 NOTE — H&P
HISTORY OF PRESENTING ILLNESS      Marla Haro is a 65 y.o. male with atrial flutter s/p ED/DCCV recently now presenting for atrial flutter ablation. He underwent successful cavotricuspid isthmus ablation and his anticoagulation was discontinued.     30 day monitor showed at least 2 possible episodes of AF (one with RVR), NSVT. All asymptomatic.      The case was discussed with Dr. Marek Lira. Patient has hypertension and therefore his CHADS score is 2. He is at elevated risk for bleeding complications. Will start aspirin 81 mg daily for now however plan for WATCHMAN. Dr. Marek Lira felt it would be acceptable to be on NOAC post WATCHMAN.  He has been experiencing chest discomfort and left arm discomfort that has been exertional. Began a few days after our last visit however reports he has had a recent stress test (<1 year) at Dr. [facility] office.          PAST MEDICAL HISTORY      Past Medical History:   Diagnosis Date    Agatston CAC score, <100 06/10/2015     Coronary calcium score 4.    Arthritis      DDD (degenerative disc disease), thoracic      Degenerative arthritis of thumb       Left    Diverticulitis      Diverticulosis of colon 09/29/2016     of the descending colon; moderate    GERD (gastroesophageal reflux disease)      Incomplete RBBB      Numbness       Right hand    Polyp of ascending colon 09/29/2016     polyps (5mm) in the ascenidning colon and transverse colon    Pruritus ani      Sinus headache               PAST SURGICAL HISTORY            Past Surgical History:   Procedure Laterality Date    ENDOSCOPY, COLON, DIAGNOSTIC   2005    HX COLECTOMY   2005     colectomy for diverticulitis    HX COLONOSCOPY   12/13    HX OTHER SURGICAL         Removal of colon polyps    MS ABDOMEN SURGERY PROC UNLISTED   12/12     exploratory, small bowel obstruction, internal hernia    MS COMPRE ELECTROPHYSIOL XM W/LEFT ATRIAL PACNG/REC N/A 10/15/2020     Lt Atrial Pace & Record During Ep Study performed by Ruth Khan MD at [address] CATH LAB    OK EPHYS EVAL W/ABLATION [address] N/A 10/15/2020     ABLATION A-FLUTTER performed by Ruth Khan MD at [facility] CATH LAB    OK INTRACARDIAC ELECTROPHYSIOLOGIC 3D MAPPING N/A 10/15/2020     EP 3D MAPPING performed by Ruth Khan MD at [address] CATH LAB             ALLERGIES            Allergies   Allergen Reactions    Ciprofloxacin Other (comments)       \"Joint pain\"    Naproxen Nausea Only       Patient was overdose, GI side effects            FAMILY HISTORY            Family History   Problem Relation Age of Onset    Stroke Mother      Heart Disease Father      negative for cardiac disease         SOCIAL HISTORY      Social History               Socioeconomic History    Marital status:        Spouse name: Not on file    Number of children: Not on file    Years of education: Not on file    Highest education level: Not on file   Tobacco Use    Smoking status: Never Smoker    Smokeless tobacco: Never Used   Substance and Sexual Activity    Alcohol use: Yes       Alcohol/week: 3.0 standard drinks       Types: 3 Cans of beer per week    Drug use: No               MEDICATIONS           Current Outpatient Medications   Medication Sig    aspirin delayed-release 81 mg tablet Take 81 mg by mouth daily.  lisinopril (PRINIVIL, ZESTRIL) 5 mg tablet Take 1 Tab by mouth daily. (Patient taking differently: Take 10 mg by mouth daily.)    multivitamin (ONE A DAY) tablet Take 1 Tab by mouth daily.  psyllium (METAMUCIL) packet Take 1 Packet by mouth two (2) times a day.      No current facility-administered medications for this visit.          I have reviewed the nurses notes, vitals, problem list, allergy list, medical history, family, social history and medications.         REVIEW OF SYMPTOMS      General: Pt denies excessive weight gain or loss.  Pt is able to conduct ADL's  HEENT: Denies blurred vision, headaches, hearing loss, epistaxis and difficulty swallowing. Respiratory: Denies cough, congestion, shortness of breath, BELTRAN, wheezing or stridor. Cardiovascular: Denies precordial pain, palpitations, edema or PND  Gastrointestinal: Denies poor appetite, indigestion, abdominal pain or blood in stool  Genitourinary: Denies hematuria, dysuria, increased urinary frequency  Musculoskeletal: Denies joint pain or swelling from muscles or joints  Neurologic: Denies tremor, paresthesias, headache, or sensory motor disturbance  Psychiatric: Denies confusion, insomnia, depression  Integumentray: Denies rash, itching or ulcers. Hematologic: Denies easy bruising, bleeding         PHYSICAL EXAMINATION      Vitals: see vitals section  General: Well developed, in no acute distress. HEENT: No jaundice, oral mucosa moist, no oral ulcers  Neck: Supple, no stiffness, no lymphadenopathy, supple  Heart:  Normal S1/S2 negative S3 or S4. Regular, no murmur, gallop or rub, no jugular venous distention  Respiratory: Clear bilaterally x 4, no wheezing or rales  Abdomen:   Soft, non-tender, bowel sounds are active.   Extremities:  No edema, normal cap refill, no cyanosis. Musculoskeletal: No clubbing, no deformities  Neuro: A&Ox3, speech clear, gait stable, cooperative, no focal neurologic deficits  Skin: Skin color is normal. No rashes or lesions.  Non diaphoretic, moist.  Vascular: 2+ pulses symmetric in all extremities         DIAGNOSTIC DATA      EKG:          LABORATORY DATA            Lab Results   Component Value Date/Time     WBC 6.7 10/12/2020 08:28 AM     HGB 13.7 10/12/2020 08:28 AM     HCT 42.5 10/12/2020 08:28 AM     PLATELET 468 60/00/3394 08:28 AM     MCV 84 10/12/2020 08:28 AM            Lab Results   Component Value Date/Time     Sodium 141 10/12/2020 08:28 AM     Potassium 4.5 10/12/2020 08:28 AM     Chloride 108 (H) 10/12/2020 08:28 AM     CO2 18 (L) 10/12/2020 08:28 AM     Anion gap 4 11/25/2015 08:24 AM     Glucose 98 10/12/2020 08:28 AM     BUN 20 10/12/2020 08:28 AM     Creatinine 0.85 10/12/2020 08:28 AM     BUN/Creatinine ratio 24 10/12/2020 08:28 AM     GFR est  10/12/2020 08:28 AM     GFR est non-AA 87 10/12/2020 08:28 AM     Calcium 9.7 10/12/2020 08:28 AM     Bilirubin, total 0.3 11/25/2015 08:24 AM     Alk. phosphatase 86 11/25/2015 08:24 AM     Protein, total 6.1 (L) 11/25/2015 08:24 AM     Albumin 3.5 11/25/2015 08:24 AM     Globulin 2.6 11/25/2015 08:24 AM     A-G Ratio 1.3 11/25/2015 08:24 AM     ALT (SGPT) 37 11/25/2015 08:24 AM             ASSESSMENT      1. Atrial flutter                        S/p ablation  2. Hypertension? 3. DG  4. GERD  5. Atrial fibrillation - sustained? Rociada? 6. Diverticulosis/diverticulitis             T.  S/p partial colectomy            PLAN      WATCHMAN      Satnam Mendoza MD  Cardiac Electrophysiology / Cardiology     [address] [facility] [address]  [address] [facility], [address] - [address] [place] [address], [address]  Samira Friedman [address]                                  Brian Rice  [phone] / [phone] Fax                            [phone] / [phone] Fax

## 2021-03-02 NOTE — ANESTHESIA PROCEDURE NOTES
Arterial Line Placement    Start time: 3/2/2021 9:58 AM  End time: 3/2/2021 10:03 AM  Performed by: Bryce Pratt MD  Authorized by: Bryce Pratt MD     Pre-Procedure  Indications:  Arterial pressure monitoring and blood sampling  Preanesthetic Checklist: patient identified, risks and benefits discussed, patient being monitored and patient being monitored      Procedure:   Prep:  Chlorhexidine  Seldinger Technique?: Yes    Orientation:  Left  Location:  Radial artery  Catheter size:  20 G  Number of attempts:  1  Cont Cardiac Output Sensor: No      Assessment:   Post-procedure:  Line secured and sterile dressing applied  Patient Tolerance:  Patient tolerated the procedure well with no immediate complications

## 2021-03-02 NOTE — PROGRESS NOTES
Right radial Arterial line discontinue, right radial with no bleeding and no hematoma, pulses are present.

## 2021-03-02 NOTE — ANESTHESIA PROCEDURE NOTES
ED        Procedure Details: probe placement, image aquisition & interpretation    Risks and benefits discussed with the patient and plans are to proceed    Procedure Note    Performed by: Nadia Centeno MD  Authorized by: Nadia Centeno MD       Indications: assessment of ascending aorta  Modalities: 2D, CF, CWD, PWD  Probe Type: multiplane  Insertion: atraumatic  Patient Status: intubated and sedated    Echocardiographic and Doppler Measurements   Aorta  Size  Diam(cm)  Dissection PlaqueThick(mm)  Plaque Mobile    Ascending normal  No 0-3 No    Arch normal  No 0-3 No    Descending normal  No 0-3 No          Valves  Annulus  Stenosis  Area/Grad  Regurg  Leaflet   Morph  Leaflet   Motion    Aortic normal none  0 normal normal    Mitral normal none  1+ normal normal    Tricuspid normal none  0 normal normal          Atria  Size  SEC (smoke)  Thrombus  Tumor  Device    Rt Atrium normal No No No No    Lt Atrium normal No No No No     Interatrial Septum Morphology: normal    Interventricular Septum Morphology: normal    Ventricle  Cavity Size  Cavity Dimension Hypertrophy  Thrombus  Gloal FXN  EF    RV normal  No no normal     LV normal   No normal 55-60%       Regional Function  (1 = normal, 2 = mildly hypokinetic, 3 = severely hypokinetic, 4 = akinetic, 5 = dyskinetic) LAV - [place]   ME LAV = 0  ME LAV = 90  ME LAV = 130   Basal Sept:1 Basal Ant:1 Basal Post:1   Mid Sept:1 Mid Ant:1 Mid Post:1   Apical Sept:1 Apical Ant:1 Basal Ant Sept:1   Basal Lat:1 Basal Inf:1 Mid Ant Sept:1   Mid Lat:1 Mid Inf:1    Apical Lat:1 Apical Inf:1        Pericardium: normal    Post Intervention Follow-up Study  Ventricular Global Function: unchanged  Ventricular Regional Function: unchanged     Valve  Function  Regurgitation  Area    Aortic no change      Mitral no change      Tricuspid no change      Prosthetic        Complications: None  Comments: No clot visualized in TRINO.   Watchman device deployed with no residual flow and appropriate compression. No pericardial effusion. All findings were discussed in detail with Dr. Kaylin Kuhn.

## 2021-03-02 NOTE — PROGRESS NOTES
TRANSFER - IN REPORT:    Verbal report received from Jerry No RN on Dara Medina., Procedure ED/Watchman Device , from the Cardiac Cath lab, for routine progression of care. Report consisted of patients Situation, Background, Assessment and Recommendations(SBAR). Information from the following report(s) Procedure Summary, MAR and Recent Results was reviewed with the receiving clinician. Opportunity for questions and clarification was provided. Assessment completed upon patients arrival to [address] [place] [address]. Cardiac Cath Lab Recovery Arrival Note:     Dara Medina. arrived to [facility]. Patient procedure= ED\WAtchman. Patient on cardiac monitor, non-invasive blood pressure, Patient status doing well without problems. Patient is A&Ox 4. Patient reports no pain, no chest pain, no n/v. Procedure site without any bleeding and no hematoma.

## 2021-03-02 NOTE — PROGRESS NOTES
1300: Caryn Reid has spoken with Dr. Destiny Aguayo. Patient thought that he was going to be changed to Xarelto instead of using Eliquis. Dr. Destiny Aguayo to change the order. 1600: Noticed that the medication has not been changed on the [facility] to Xarelto, called Dr. Destiny Aguayo and notified Caryn Reid, EP Manager. This medication needs to be changed, made him aware that the patient has not received the ordered Eliquis, since this medication was going to be changed. Amy Ratliff RN on [facility] to let her know that we are trying to get the medication changed in the [facility].     1645: Dr. Destiny Aguayo called back and said if the patient's heart rate pauses are  3 seconds or less and the patient is asymptomatic then just continue to monitor. No changes at this time. Dr. Destiny Aguayo would like a portable chest x-ray post watchman device. Dr. Destiny Aguayo. MD to change the Eliquis order to Xarelto in just a few minutes. 1650: Amy Ratliff RN on [employer] to make her aware.

## 2021-03-02 NOTE — ANESTHESIA POSTPROCEDURE EVALUATION
Post-Anesthesia Evaluation and Assessment    Patient: Chaya Macias. MRN: [id number]  SSN: [id number]    YOB: 1950  Age: 65 y.o. Sex: male       Cardiovascular Function/Vital Signs  Visit Vitals  /68 (BP 1 Location: Right lower arm, BP Patient Position: At rest)   Pulse (!) 54   Temp 36.4 °C (97.6 °F)   Resp 15   Ht 6' 2\" (1.88 m)   Wt 97.4 kg (214 lb 12.8 oz)   SpO2 100%   BMI 27.58 kg/m²       Patient is status post General anesthesia for Procedure(s):  WATCHMAN TRINO CLOSURE DEVICE. Nausea/Vomiting: None    Postoperative hydration reviewed and adequate. Pain:  Pain Scale 1: Numeric (0 - 10) (03/02/21 1501)  Pain Intensity 1: 0 (03/02/21 1501)   Managed    Neurological Status: At baseline    Mental Status and Level of Consciousness: Alert and oriented to person, place, and time    Pulmonary Status:   O2 Device: Room air (03/02/21 1501)   Adequate oxygenation and airway patent    Complications related to anesthesia: None    Post-anesthesia assessment completed. No concerns    Signed By: Giselle Owen MD     March 2, 2021              Procedure(s):  WATCHMAN TRINO CLOSURE DEVICE. general    <BSHSIANPOST>    INITIAL Post-op Vital signs:   Vitals Value Taken Time   /68 03/02/21 1500   Temp 36.4 °C (97.6 °F) 03/02/21 1500   Pulse 56 03/02/21 1551   Resp 22 03/02/21 1551   SpO2 98 % 03/02/21 1501   Vitals shown include unvalidated device data.

## 2021-03-03 ENCOUNTER — APPOINTMENT (OUTPATIENT)
Dept: NON INVASIVE DIAGNOSTICS | Age: 71
DRG: 274 | End: 2021-03-03
Attending: NURSE PRACTITIONER
Payer: COMMERCIAL

## 2021-03-03 ENCOUNTER — TELEPHONE (OUTPATIENT)
Dept: CARDIOLOGY CLINIC | Age: 71
End: 2021-03-03

## 2021-03-03 VITALS
HEART RATE: 56 BPM | TEMPERATURE: 98 F | SYSTOLIC BLOOD PRESSURE: 126 MMHG | RESPIRATION RATE: 19 BRPM | BODY MASS INDEX: 26.88 KG/M2 | OXYGEN SATURATION: 100 % | HEIGHT: 74 IN | DIASTOLIC BLOOD PRESSURE: 4 MMHG | WEIGHT: 209.44 LBS

## 2021-03-03 LAB
ACT BLD: 197 SECS (ref 79–138)
ECHO LV INTERNAL DIMENSION DIASTOLIC: 5.72 CM (ref 4.2–5.9)
ECHO LV INTERNAL DIMENSION SYSTOLIC: 3.28 CM
ECHO LV IVSD: 0.98 CM (ref 0.6–1)
ECHO LV MASS 2D: 208.7 G (ref 88–224)
ECHO LV MASS INDEX 2D: 94.2 G/M2 (ref 49–115)
ECHO LV POSTERIOR WALL DIASTOLIC: 0.89 CM (ref 0.6–1)

## 2021-03-03 PROCEDURE — 74011250637 HC RX REV CODE- 250/637: Performed by: INTERNAL MEDICINE

## 2021-03-03 PROCEDURE — 93325 DOPPLER ECHO COLOR FLOW MAPG: CPT | Performed by: SPECIALIST

## 2021-03-03 PROCEDURE — 93308 TTE F-UP OR LMTD: CPT

## 2021-03-03 PROCEDURE — 93321 DOPPLER ECHO F-UP/LMTD STD: CPT | Performed by: SPECIALIST

## 2021-03-03 PROCEDURE — 93308 TTE F-UP OR LMTD: CPT | Performed by: SPECIALIST

## 2021-03-03 RX ORDER — LISINOPRIL 10 MG/1
10 TABLET ORAL DAILY
Qty: 30 TAB | Refills: 0 | Status: SHIPPED
Start: 2021-03-04

## 2021-03-03 RX ADMIN — Medication 10 ML: at 05:34

## 2021-03-03 RX ADMIN — RIVAROXABAN 20 MG: 20 TABLET, FILM COATED ORAL at 08:28

## 2021-03-03 RX ADMIN — LISINOPRIL 10 MG: 10 TABLET ORAL at 08:28

## 2021-03-03 NOTE — DISCHARGE SUMMARY
Cardiology Discharge Summary     Patient ID:  Breana Jeffers  [id number]  40 y.o.  1950    Admit Date: 3/2/2021    Discharge Date: 3/3/2021    Admitting Physician: Shalonda Hagen MD     Discharge Physician: Garrett Estrada. Terrance Roland MD    Admission Diagnoses:   Atrial fibrillation, unspecified type ([address] [I48.91]  Presence of Watchman left atrial appendage closure device [Z95.818]    Discharge Diagnoses: Active Problems:    Presence of Watchman left atrial appendage closure device (3/2/2021)        Discharge Condition: Good    Cardiology Procedures this Admission:  Left atrial appendage occlusion (watchman)    Consults: none    Hospital Course: Deion Rocha Jr. is a 65 y. o. male with atrial flutter s/p ED/DCCV recently now presenting for atrial flutter ablation. He underwent successful cavotricuspid isthmus ablation and his anticoagulation was discontinued. 30 day monitor showed at least 2 possible episodes of AF (one with RVR), NSVT. All asymptomatic. His CHADS score is 2. Has elevated bleed risk for complications. Thus, underwent TRINO occlusion with Watchman device on 3/2/21. He was monitored overnight. No chest pain or SOB at rest or with ambulation. He did exhibit multiple brief pauses of up to 2.37 seconds but was asymptomatic. Several brief episodes of Sinus bradycardia to 37 bpm, again asymptomatic. He was NSR 0 sinus bradycardia with HR 50's-60's at time of discharge. Dr. Terrance Roland will arrange 30 day event monitor to be sent to patient's home. An echocardiogram done on day of discharge showed NL EF and no pericardial effusion. Per Dr. Terrance Roland, ok for pt to obtain Covid 19 vaccine this week. He was discharged on Xarelto.         Visit Vitals  BP (!) 126/4   Pulse (!) 56   Temp 98 °F (36.7 °C)   Resp 19   Ht 6' 2\" (1.88 m)   Wt 209 lb 7 oz (95 kg)   SpO2 100%   BMI 26.89 kg/m²       Physical Exam  Abdomen: soft, non-tender.  Bowel sounds normal.   Extremities: no LE edema, + PP bilaterally Rt groin site with no bleeding or hematoma. Left radial site with dressing c/d/i, no bleeding or hematoma. Heart: regular rate and rhythm, S1, S2 normal, no murmurs, clicks, rubs or gallops  Lungs: clear to auscultation bilaterally  Neck: supple, symmetrical, trachea midline, no adenopathy,  Neurologic: Grossly normal  Pulses: 2+ and symmetrical    Labs: No results for input(s): WBC, HGB, HCT, PLT, HGBEXT, HCTEXT, PLTEXT in the last 72 hours. No results for input(s): NA, K, CL, CO2, GLU, BUN, CREA, CA, MG, PHOS, ALB, TBIL, ALT, INR, INREXT in the last 72 hours. No lab exists for component: SGOT    No results for input(s): TROIQ, CPK, CKMB in the last 72 hours. EKG:   CXR:   Other Diagnostic Tests:   Device check:     Disposition:     Patient Instructions:   Current Discharge Medication List      START taking these medications    Details   rivaroxaban (XARELTO) 20 mg tab tablet Take 1 Tab by mouth daily. Qty: 30 Tab, Refills: 1         CONTINUE these medications which have CHANGED    Details   lisinopriL (PRINIVIL, ZESTRIL) 10 mg tablet Take 1 Tab by mouth daily. Qty: 30 Tab, Refills: 0         CONTINUE these medications which have NOT CHANGED    Details   aspirin delayed-release 81 mg tablet Take 81 mg by mouth daily. multivitamin (ONE A DAY) tablet Take 1 Tab by mouth daily. psyllium (METAMUCIL) packet Take 1 Packet by mouth two (2) times a day. Reference discharge instructions provided by nursing for diet and activity. Follow-up with   Future Appointments   Date Time Provider Marce Peterson   3/3/2021  1:00 PM HOLTER, STFRANCIS [facility] AMB   3/17/2021  1:20 PM Johan Naranjo MD [facility] AMB       Signed:  Tonia Roy.  STEPHANIE John

## 2021-03-03 NOTE — TELEPHONE ENCOUNTER
Patient's wife, Jaquelin Mcintosh, calling in regards to xarelto, was told by insurance that they needed a verbal prior authorization. Requesting a call back.      Patient's phone: [phone]  Prior auth: [id number]

## 2021-03-03 NOTE — DISCHARGE INSTRUCTIONS
Left Atrial Appendage Occlusion (WATCHMAN)   Discharge Instructions      You have just underwent left atrial appendage occlusion utilizing a WATCHMAN device deployment. There were catheters temporarily placed in your heart through a puncture in the Veins and/or Arteries in your groin. Notify Dr. Zoila Hurley office if you developed dizziness, lightheadedness or have any episodes of passing out. WHAT TO EXPECT      If you have had a WATCHMAN procedure please notify Dr. Zoila Hurley immediately if you experience chest discomfort, shortness of breath or feeling like you are going to pass out. If the symptoms becomes severe or breathing becomes difficult, call 911 or go to the closest emergency room.  Mild to moderate, non-painful, bruising or swelling at the puncture site is not un-common, and will resolve in 7 - 10 days.  If a closure device was used to seal your artery or vein, a separate pamphlet will be given to you with these discharge instructions. It is very important that you review the information in the pamphlet for different restrictions and precautions.  You have a small gauze dressing applied to the puncture site in your groin. You may remove this the following morning. MEDICATIONS      Take only the medications prescribed to you at discharge. ACTIVITY      A responsible adult must take you home. Do not drive a car for 72 hours.  Rest quietly for the remainder of the day.  Do not lift anything greater than 10 pounds for 3 days.  Limit bending at the puncture site and use of stairs for at least 3 days.  You may remove the bandage and shower the morning after the procedure. Do not take a bath for 3 days. SYMPTOMS THAT NEED TO BE REPORTED IMMEDIATELY      Bleeding at the puncture site. If there is bleeding, lie down and hold firm direct pressure for at least 5 minutes.   If the bleeding does not stop, go to the closest emergency room, or call 911.     Temperature more than 100.5 F.   Redness or warmth at the puncture site.  Increasing pain, numbness, coolness or blue discoloration of the extremity where the puncture is located.  Pulsating mass at the puncture site.  A new lump at the puncture site, or increasing swelling at the site.  Bruising at the puncture site that enlarges or becomes painful (some bruising at the site is common and will go away in 7 - 10 days).  Rapid heart rate or palpitations.  Dizziness, lightheadedness, fainting.  REMEMBER: If you feel something is an emergency or cannot be handled over the phone, call 911 or go to the closest emergency room. Paolo Amos in 2 weeks    Future Appointments   Date Time Provider Marce Littlei   3/3/2021  1:00 PM HOLTER, STFRANCIS [facility] BS AMB   3/17/2021  1:20 PM Jessie Naranjo MD [facility] BS AMB     Per Dr. Dmitri Amos, ok for you to get the Covid Vaccine this week. Resume lisinopril dosing at dose you were taking prior to admission.   Fanta Olmos MD  Cardiac Electrophysiology / Cardiology    [address]  [address] [facility], [facility], [address]  [phone] / [phone] Fax   [phone] / [phone] Fax

## 2021-03-03 NOTE — PROGRESS NOTES
0730: Bedside shift change report given to Ben Rios (oncoming nurse) by Jackson Patton (offgoing nurse). Report included the following information SBAR, Kardex, Intake/Output, MAR, Accordion, Recent Results and Cardiac Rhythm sinus barbara. 1300: I have reviewed discharge instructions with the patient and spouse. The patient and spouse verbalized understanding. Discharge medications reviewed with patient and spouse and appropriate educational materials and side effects teaching were provided.

## 2021-03-03 NOTE — PROGRESS NOTES
CELESTINO: TBD  RUR:7%  Transport: TBD    CM attempted initial assessment; RN in the room with patient.      DASHA Zuñiga, MSW Supervisee

## 2021-03-03 NOTE — PROGRESS NOTES
1930  Bedside shift change report given to Janes Nix (oncoming nurse) by David Rangel (offgoing nurse). Report included the following information SBAR, Kardex, Intake/Output, MAR, Accordion, Recent Results and Cardiac Rhythm NSR/SB. Multiple < 3 sec pauses over night    0730  Bedside shift change report given to David Rangel (oncoming nurse) by Janes Nix (offgoing nurse). Report included the following information SBAR, Kardex, Intake/Output, MAR, Accordion, Recent Results and Cardiac Rhythm NSR/SB.

## 2021-03-03 NOTE — PROGRESS NOTES
CELESTINO: return home with wife for assist  RUR: 7%  Transport: wife, Suresh Coronado; [phone]     Reason for Admission:   cavotricuspid isthmus ablation                   RUR Score:  7%                   Plan for utilizing home health:  No HH recommended for patient. PCP: First and Last name:  Dr. Syl Conner   Name of Practice: [facility] [address]   Are you a current patient: Yes/No: Yes   Approximate date of last visit: 1 month   Can you participate in a virtual visit with your PCP: Yes  Patient stated he has a follow up appointment with PCP on March 16th. Current Advanced Directive/Advance Care Plan: Patient and wife are in the process of setting up AD at home. Healthcare Decision Maker: Wife is [employer]. Click here to complete [address] including selection of the Healthcare Decision Maker Relationship (ie \"Primary\")                         Transition of Care Plan:     CM met with patient and wife at the bedside to introduce self and role. Patient stated he lives at home with his wife in a 2 story home; 3 steps to enter. Patient is independent with ADLs at baseline. CM confirmed all demographics. Patient's wife will transport the patient at discharge; no further CM needs identified. Care Management Interventions  PCP Verified by CM: Yes  Palliative Care Criteria Met (RRAT>21 & CHF Dx)?: No  Mode of Transport at Discharge: Other (see comment)(wife, Nancy)  Adalt Signup: Yes  Discharge Durable Medical Equipment: No  Health Maintenance Reviewed: Yes  Physical Therapy Consult: No  Occupational Therapy Consult: No  Speech Therapy Consult: No  Current Support Network: Lives with Spouse  Confirm Follow Up Transport: Family  Discharge Location  Discharge Placement: Home    Medicare pt has received, reviewed, and signed 2nd IM letter informing them of their right to appeal the discharge. Signed copy has been placed on pt bedside chart.     Dimitri Brittle) Franco Lins, [place], MSW Supervisee

## 2021-03-03 NOTE — PROGRESS NOTES
I prayed with Janine Ramirez and celebrated with him the Anointing of the Sick.   Father Anastacio Weston

## 2021-03-04 ENCOUNTER — TELEPHONE (OUTPATIENT)
Dept: CARDIOLOGY CLINIC | Age: 71
End: 2021-03-04

## 2021-03-04 NOTE — TELEPHONE ENCOUNTER
PA done for Xarelto 20 mg via [facility]. Approval received. Case ID # [id number]. Approval dates 2/22/21 through 3/4/22.

## 2021-03-04 NOTE — TELEPHONE ENCOUNTER
Called patient, ID verified using two patient identifiers. Advised patient that we are working on prior authorization for Xarelto 20 mg. Informed him that we can provide him with samples of Xarelto while we are waiting for the prior authorization results. He is appreciative of samples and will come by the office later today to pick them up. Requested Prescriptions     Signed Prescriptions Disp Refills    rivaroxaban (Xarelto) 20 mg tab tablet 28 Tab 0     Sig: Take 1 Tab by mouth daily (with breakfast). Authorizing Provider: Ramone Evans     Ordering User: Marleni LEHMAN     Samples provided per verbal order Dr. Jono Donnelly.

## 2021-03-17 ENCOUNTER — OFFICE VISIT (OUTPATIENT)
Dept: CARDIOLOGY CLINIC | Age: 71
End: 2021-03-17
Payer: COMMERCIAL

## 2021-03-17 VITALS
HEART RATE: 49 BPM | HEIGHT: 74 IN | SYSTOLIC BLOOD PRESSURE: 130 MMHG | BODY MASS INDEX: 27.44 KG/M2 | OXYGEN SATURATION: 98 % | RESPIRATION RATE: 18 BRPM | DIASTOLIC BLOOD PRESSURE: 76 MMHG | WEIGHT: 213.8 LBS

## 2021-03-17 DIAGNOSIS — I48.91 ATRIAL FIBRILLATION, UNSPECIFIED TYPE (HCC): Primary | ICD-10-CM

## 2021-03-17 DIAGNOSIS — Z98.890 S/P ABLATION OF ATRIAL FLUTTER: ICD-10-CM

## 2021-03-17 DIAGNOSIS — I10 ESSENTIAL HYPERTENSION: ICD-10-CM

## 2021-03-17 DIAGNOSIS — Z86.79 S/P ABLATION OF ATRIAL FLUTTER: ICD-10-CM

## 2021-03-17 DIAGNOSIS — I45.10 INCOMPLETE RBBB: ICD-10-CM

## 2021-03-17 DIAGNOSIS — Z01.812 PRE-PROCEDURE LAB EXAM: ICD-10-CM

## 2021-03-17 DIAGNOSIS — I48.92 ATRIAL FLUTTER, UNSPECIFIED TYPE (HCC): ICD-10-CM

## 2021-03-17 PROCEDURE — G8752 SYS BP LESS 140: HCPCS | Performed by: INTERNAL MEDICINE

## 2021-03-17 PROCEDURE — G8419 CALC BMI OUT NRM PARAM NOF/U: HCPCS | Performed by: INTERNAL MEDICINE

## 2021-03-17 PROCEDURE — G8510 SCR DEP NEG, NO PLAN REQD: HCPCS | Performed by: INTERNAL MEDICINE

## 2021-03-17 PROCEDURE — G8536 NO DOC ELDER MAL SCRN: HCPCS | Performed by: INTERNAL MEDICINE

## 2021-03-17 PROCEDURE — 1111F DSCHRG MED/CURRENT MED MERGE: CPT | Performed by: INTERNAL MEDICINE

## 2021-03-17 PROCEDURE — G8754 DIAS BP LESS 90: HCPCS | Performed by: INTERNAL MEDICINE

## 2021-03-17 PROCEDURE — 99215 OFFICE O/P EST HI 40 MIN: CPT | Performed by: INTERNAL MEDICINE

## 2021-03-17 PROCEDURE — 1101F PT FALLS ASSESS-DOCD LE1/YR: CPT | Performed by: INTERNAL MEDICINE

## 2021-03-17 PROCEDURE — 93000 ELECTROCARDIOGRAM COMPLETE: CPT | Performed by: INTERNAL MEDICINE

## 2021-03-17 PROCEDURE — G9711 PT HX TOT COL OR COLON CA: HCPCS | Performed by: INTERNAL MEDICINE

## 2021-03-17 PROCEDURE — G8427 DOCREV CUR MEDS BY ELIG CLIN: HCPCS | Performed by: INTERNAL MEDICINE

## 2021-03-17 RX ORDER — ATORVASTATIN CALCIUM 10 MG/1
10 TABLET, FILM COATED ORAL DAILY
COMMUNITY

## 2021-03-17 NOTE — PROGRESS NOTES
Room #: 3    Recently started on meds for his cholesterol. Visit Vitals  /76 (BP 1 Location: Left upper arm, BP Patient Position: Sitting, BP Cuff Size: Adult)   Pulse (!) 49   Resp 18   Ht 6' 2\" (1.88 m)   Wt 213 lb 12.8 oz (97 kg)   SpO2 98%   BMI 27.45 kg/m²         Chest pain:  NO  Shortness of breath:  NO  Edema: NO  Palpitations, skipped beats, rapid heartbeat:  NO  Dizziness:  NO    1. Have you been to the ER, urgent care clinic since your last visit? Hospitalized since your last visit? No    2. Have you seen or consulted any other health care providers outside of the [address] [place] [address] since your last visit? Include any pap smears or colon screening.  No      Refills:  NO

## 2021-03-17 NOTE — PATIENT INSTRUCTIONS
You are scheduled for the following procedure with Dr. Otis Cerrato:  ED at [facility] be aware that your procedure date/time is tentative and subject to change due to emergency cases. Procedure date/time:    April 23, 2021  Time: 9:30 am - please arrive by 8:30 am 
 
 
 
o PRE-PROCEDURE LABS NEEDED: YES  
o Forms for labwork may be given at appointment. If not, they will be mailed to you. Labs should be completed within 5-7 days of procedure. These can be done at any [facility] or [facility] lab (one is located in [address] [place] [address]. No appointment needed). YES A COVID swab is needed 4 days prior to your procedure. o YOU MAY NEED PRE-PROCEDURE IMAGING, CHEST CTA OR CARDIAC MRI.  
   
[]  Chest CTA []  Cardiac MRI    ([facility] scheduling to call you)  -  [phone] ARRIVAL time:  (You will need  to be discharged home with.)  
 
o [facility] procedures: please arrive to check in on 2nd floor one hour prior to your procedure the day of your procedure. 
 
o [facility] procedures: arrive to check in on 1st floor near [facility] two hours prior to your procedure. Please review the following medication instructions: Do not stop the following blood thinner prior to procedure: Xarelto and Aspirin If you take any of the following Diabetic medications, please use as follows: 
 
[]  Glucophage/Metformin  -  Hold morning dose on day of procedure. 
 
[]  Insulin  -  Hold morning dose on day of procedure. 
 
[]  Lantus  -  Reduce dose by ½ evening before procedure. Nothing to eat or drink after midnight before your procedure. You may take all other medications as directed the morning of your procedure with a sip of water unless otherwise indicated. Please contact the office [phone] and ask for a member of Dr. Otis Cerrato' team for procedure questions. There is a physician on call for the office after hours for immediate needs.   
 
FOLLOW UP: 
 Your appointments will be made for you post procedure based off of discharge instructions. You may have driving restrictions for a short time after your procedure (usually 2-3 days). Pacemaker/ICD patients will be unable to have an MRI until 6 weeks after implant, NO dental work for 8 weeks after your implant.

## 2021-03-17 NOTE — PROGRESS NOTES
HISTORY OF PRESENTING ILLNESS      Kalina Goodrich is a 66 y.o. male who underwent left atrial appendage occlusion with placement of a WATCHMAN device. He remains on Xarelto. He is doing well following his procedure without groin pain/chest pain. He is tolerating his anticoagulation thus far.        PAST MEDICAL HISTORY     Past Medical History:   Diagnosis Date    Agatston CAC score, <100 06/10/2015    Coronary calcium score 4.    Arthritis     DDD (degenerative disc disease), thoracic     Degenerative arthritis of thumb     Left    Diverticulitis     Diverticulosis of colon 09/29/2016    of the descending colon; moderate    GERD (gastroesophageal reflux disease)     Incomplete RBBB     Numbness     Right hand    Polyp of ascending colon 09/29/2016    polyps (5mm) in the ascenidning colon and transverse colon    Pruritus ani     Sinus headache            PAST SURGICAL HISTORY     Past Surgical History:   Procedure Laterality Date    ENDOSCOPY, COLON, DIAGNOSTIC  2005    HX COLONOSCOPY  12/13    HX OTHER SURGICAL      Removal of colon polyps    HX TOTAL COLECTOMY  2005    colectomy for diverticulitis    IN ABDOMEN SURGERY PROC UNLISTED  12/12    exploratory, small bowel obstruction, internal hernia    IN COMPRE ELECTROPHYSIOL XM W/LEFT ATRIAL PACNG/REC N/A 10/15/2020    Lt Atrial Pace & Record During Ep Study performed by Miguelina Winn MD at [address] CATH LAB    IN EPHYS EVAL W/ABLATION [address] N/A 10/15/2020    ABLATION A-FLUTTER performed by Miguelina Winn MD at [address] CATH LAB    IN INTRACARDIAC ELECTROPHYSIOLOGIC 3D MAPPING N/A 10/15/2020    EP 3D MAPPING performed by Miguelina Winn MD at [address] CATH LAB          ALLERGIES     Allergies   Allergen Reactions    Ciprofloxacin Other (comments)     \"Joint pain\"    Naproxen Nausea Only     Patient was overdose, GI side effects          FAMILY HISTORY     Family History   Problem Relation Age of Onset    Stroke Mother     Heart Disease Father     negative for cardiac disease       SOCIAL HISTORY     Social History     Socioeconomic History    Marital status:      Spouse name: Not on file    Number of children: Not on file    Years of education: Not on file    Highest education level: Not on file   Tobacco Use    Smoking status: Never Smoker    Smokeless tobacco: Never Used   Substance and Sexual Activity    Alcohol use: Yes     Alcohol/week: 3.0 standard drinks     Types: 3 Cans of beer per week    Drug use: No         MEDICATIONS     Current Outpatient Medications   Medication Sig    rivaroxaban (Xarelto) 20 mg tab tablet Take 1 Tab by mouth daily (with breakfast).  lisinopriL (PRINIVIL, ZESTRIL) 10 mg tablet Take 1 Tab by mouth daily.  rivaroxaban (XARELTO) 20 mg tab tablet Take 1 Tab by mouth daily.  aspirin delayed-release 81 mg tablet Take 81 mg by mouth daily.  multivitamin (ONE A DAY) tablet Take 1 Tab by mouth daily.  psyllium (METAMUCIL) packet Take 1 Packet by mouth two (2) times a day. No current facility-administered medications for this visit. I have reviewed the nurses notes, vitals, problem list, allergy list, medical history, family, social history and medications. REVIEW OF SYMPTOMS      General: Pt denies excessive weight gain or loss. Pt is able to conduct ADL's  HEENT: Denies blurred vision, headaches, hearing loss, epistaxis and difficulty swallowing. Respiratory: Denies cough, congestion, shortness of breath, BELTRAN, wheezing or stridor.   Cardiovascular: Denies precordial pain, palpitations, edema or PND  Gastrointestinal: Denies poor appetite, indigestion, abdominal pain or blood in stool  Genitourinary: Denies hematuria, dysuria, increased urinary frequency  Musculoskeletal: Denies joint pain or swelling from muscles or joints  Neurologic: Denies tremor, paresthesias, headache, or sensory motor disturbance  Psychiatric: Denies confusion, insomnia, depression  Integumentray: Denies rash, itching or ulcers. Hematologic: Denies easy bruising, bleeding       PHYSICAL EXAMINATION      Vitals: see vitals section  General: Well developed, in no acute distress. HEENT: No jaundice, oral mucosa moist, no oral ulcers  Neck: Supple, no stiffness, no lymphadenopathy, supple  Heart:  Normal S1/S2 negative S3 or S4. Regular, no murmur, gallop or rub, no jugular venous distention  Respiratory: Clear bilaterally x 4, no wheezing or rales  Abdomen:   Soft, non-tender, bowel sounds are active. Extremities:  No edema, normal cap refill, no cyanosis. Musculoskeletal: No clubbing, no deformities  Neuro: A&Ox3, speech clear, gait stable, cooperative, no focal neurologic deficits  Skin: Skin color is normal. No rashes or lesions. Non diaphoretic, moist.  Vascular: 2+ pulses symmetric in all extremities       DIAGNOSTIC DATA      EKG:        LABORATORY DATA      Lab Results   Component Value Date/Time    WBC 6.3 02/25/2021 08:08 AM    HGB 14.2 02/25/2021 08:08 AM    HCT 44.2 02/25/2021 08:08 AM    PLATELET 570 88/73/6083 08:08 AM    MCV 85.0 02/25/2021 08:08 AM      Lab Results   Component Value Date/Time    Sodium 140 02/25/2021 03:21 PM    Potassium 4.8 02/25/2021 03:21 PM    Chloride 107 02/25/2021 03:21 PM    CO2 28 02/25/2021 03:21 PM    Anion gap 5 02/25/2021 03:21 PM    Glucose 97 02/25/2021 03:21 PM    BUN 22 (H) 02/25/2021 03:21 PM    Creatinine 1.20 02/25/2021 03:21 PM    BUN/Creatinine ratio 18 02/25/2021 03:21 PM    GFR est AA >60 02/25/2021 03:21 PM    GFR est non-AA 60 (L) 02/25/2021 03:21 PM    Calcium 9.0 02/25/2021 03:21 PM    Bilirubin, total 0.3 11/25/2015 08:24 AM    Alk.  phosphatase 86 11/25/2015 08:24 AM    Protein, total 6.1 (L) 11/25/2015 08:24 AM    Albumin 3.5 11/25/2015 08:24 AM    Globulin 2.6 11/25/2015 08:24 AM    A-G Ratio 1.3 11/25/2015 08:24 AM    ALT (SGPT) 37 11/25/2015 08:24 AM           ASSESSMENT      1. Atrial flutter                        S/p ablation  2. Hypertension  3. DG  4. GERD  5. Atrial fibrillation   6. Diverticulosis/diverticulitis             X. S/p partial colectomy       PLAN     45-day ED to determine whether acceptable to discontinue anticoagulation      ICD-10-CM ICD-9-CM    1. Atrial fibrillation, unspecified type ([address]  I48.91 427.31    2. Atrial flutter, unspecified type ([address]  I48.92 427.32    3. S/P ablation of atrial flutter  Z98.890 V45.89     Z86.79     4. Essential hypertension  I10 401.9    5. Incomplete RBBB  I45.10 426.4      No orders of the defined types were placed in this encounter. FOLLOW-UP       Thank you, Fern Abreu MD for allowing me to participate in the care of this extraordinarily pleasant male. Please do not hesitate to contact me for further questions/concerns.          Sindy Sarmiento MD  Cardiac Electrophysiology / Cardiology    [address]  [address]    [address] [place] [address]  [phone] / [phone] Fax   [phone] / [phone] Fax

## 2021-03-19 ENCOUNTER — PREP FOR PROCEDURE (OUTPATIENT)
Dept: CARDIOLOGY CLINIC | Age: 71
End: 2021-03-19

## 2021-03-19 RX ORDER — SODIUM CHLORIDE 0.9 % (FLUSH) 0.9 %
5-40 SYRINGE (ML) INJECTION EVERY 8 HOURS
Status: CANCELLED | OUTPATIENT
Start: 2021-03-19

## 2021-03-19 RX ORDER — SODIUM CHLORIDE 0.9 % (FLUSH) 0.9 %
5-40 SYRINGE (ML) INJECTION AS NEEDED
Status: CANCELLED | OUTPATIENT
Start: 2021-03-19

## 2021-03-22 ENCOUNTER — TELEPHONE (OUTPATIENT)
Dept: CARDIOLOGY CLINIC | Age: 71
End: 2021-03-22

## 2021-03-22 NOTE — TELEPHONE ENCOUNTER
Received serious loop alert from [employer]. 3/20/21  1:30 am  Auto Trigger  Pause (4.4 sec), A Fib Sustained, Sinus Rhythm rates up to 80 bpm  (Junctional Escape Beat). Called pt to see if he was symptomatic and activity. He was sleeping and is asymptomatic. Pt had WATCHMAN on 3/2/21. Per Dr Kirkpatrick Province schedule appt to see him due to pause. Informed pt that Diana Almaraz will call him to make appt.

## 2021-03-24 ENCOUNTER — OFFICE VISIT (OUTPATIENT)
Dept: CARDIOLOGY CLINIC | Age: 71
End: 2021-03-24
Payer: COMMERCIAL

## 2021-03-24 VITALS
HEIGHT: 74 IN | BODY MASS INDEX: 27.46 KG/M2 | SYSTOLIC BLOOD PRESSURE: 120 MMHG | DIASTOLIC BLOOD PRESSURE: 74 MMHG | WEIGHT: 214 LBS | OXYGEN SATURATION: 56 % | HEART RATE: 99 BPM

## 2021-03-24 DIAGNOSIS — I48.91 ATRIAL FIBRILLATION, UNSPECIFIED TYPE (HCC): Primary | ICD-10-CM

## 2021-03-24 DIAGNOSIS — Z86.79 S/P ABLATION OF ATRIAL FLUTTER: ICD-10-CM

## 2021-03-24 DIAGNOSIS — Z01.812 PRE-PROCEDURE LAB EXAM: ICD-10-CM

## 2021-03-24 DIAGNOSIS — Z98.890 S/P ABLATION OF ATRIAL FLUTTER: ICD-10-CM

## 2021-03-24 DIAGNOSIS — I48.92 ATRIAL FLUTTER, UNSPECIFIED TYPE (HCC): ICD-10-CM

## 2021-03-24 DIAGNOSIS — I45.10 INCOMPLETE RBBB: ICD-10-CM

## 2021-03-24 DIAGNOSIS — I10 ESSENTIAL HYPERTENSION: ICD-10-CM

## 2021-03-24 DIAGNOSIS — R00.1 BRADYCARDIA: ICD-10-CM

## 2021-03-24 PROCEDURE — 99215 OFFICE O/P EST HI 40 MIN: CPT | Performed by: INTERNAL MEDICINE

## 2021-03-24 NOTE — PATIENT INSTRUCTIONS
You are scheduled for the following procedure with Dr. Jurgen Hilliard:  Dual Chamber Pacemaker @ [facility] PLEASE be aware that your procedure date/time is tentative and subject to change due to emergency cases. Procedure date/time:    April 12, 2021  Time: 12:30 pm - please arrive by 11:30 am 
 
 
 
o PRE-PROCEDURE LABS NEEDED: YES  
o Forms for labwork may be given at appointment. If not, they will be mailed to you. Labs should be completed within 5-7 days of procedure. These can be done at any [facility] or [facility] lab (one is located in [address] [place] [address]. No appointment needed). NO A COVID swab is needed 4 days prior to your procedure. o YOU MAY NEED PRE-PROCEDURE IMAGING, CHEST CTA OR CARDIAC MRI.  
   
[]  Chest CTA []  Cardiac MRI    ([facility] scheduling to call you)  -  [phone] ARRIVAL time:  (You will need  to be discharged home with.)  
 
o [facility] procedures: please arrive to check in on 2nd floor one hour prior to your procedure the day of your procedure. 
 
o [facility] procedures: arrive to check in on 1st floor near [facility] two hours prior to your procedure. Please review the following medication instructions: Do not stop the following blood thinner prior to procedure: Xarelto If you take any of the following Diabetic medications, please use as follows: 
 
[]  Glucophage/Metformin  -  Hold morning dose on day of procedure. 
 
[]  Insulin  -  Hold morning dose on day of procedure. 
 
[]  Lantus  -  Reduce dose by ½ evening before procedure. Nothing to eat or drink after midnight before your procedure. You may take all other medications as directed the morning of your procedure with a sip of water unless otherwise indicated. Please contact the office [phone] and ask for a member of Dr. Jurgen Hilliard' team for procedure questions. There is a physician on call for the office after hours for immediate needs. FOLLOW UP: 
 Your appointments will be made for you post procedure based off of discharge instructions. You may have driving restrictions for a short time after your procedure (usually 2-3 days). Pacemaker/ICD patients will be unable to have an MRI until 6 weeks after implant, NO dental work for 8 weeks after your implant.

## 2021-03-24 NOTE — PROGRESS NOTES
Room 5    Visit Vitals  /74 (BP 1 Location: Left upper arm, BP Patient Position: Sitting, BP Cuff Size: Adult)   Pulse 99   Ht 6' 2\" (1.88 m)   Wt 214 lb (97.1 kg)   SpO2 (!) 56%   BMI 27.48 kg/m²       EM 3-10 to 4-9-21    Chest pain: no  Shortness of breath: no  Edema: no  Palpitations, Skipped beats, Rapid heartbeat: no  Dizziness: no    New diagnosis/Surgeries: no    ER/Hospitalizations: watchman procedure 3-2-21     Refills: no

## 2021-03-24 NOTE — PROGRESS NOTES
HISTORY OF PRESENTING ILLNESS      Marla Haro is a 66 y.o. male who underwent left atrial appendage occlusion recently however noted to have recurrent prolonged greater than 3-second pauses on monitoring. He was noted to have a 4.4-second pause at 1 AM on his monitor however was also noted to have pauses on telemetry when recently hospitalized following his procedure. He also had an episode of AF with RVR with heart rates in the 120 to 130 bpm range.        PAST MEDICAL HISTORY     Past Medical History:   Diagnosis Date    Agatston CAC score, <100 06/10/2015    Coronary calcium score 4.    Arthritis     DDD (degenerative disc disease), thoracic     Degenerative arthritis of thumb     Left    Diverticulitis     Diverticulosis of colon 09/29/2016    of the descending colon; moderate    GERD (gastroesophageal reflux disease)     Incomplete RBBB     Numbness     Right hand    Polyp of ascending colon 09/29/2016    polyps (5mm) in the ascenidning colon and transverse colon    Pruritus ani     Sinus headache            PAST SURGICAL HISTORY     Past Surgical History:   Procedure Laterality Date    ENDOSCOPY, COLON, DIAGNOSTIC  2005    HX COLONOSCOPY  12/13    HX OTHER SURGICAL      Removal of colon polyps    HX TOTAL COLECTOMY  2005    colectomy for diverticulitis    ID ABDOMEN SURGERY PROC UNLISTED  12/12    exploratory, small bowel obstruction, internal hernia    ID COMPRE ELECTROPHYSIOL XM W/LEFT ATRIAL PACNG/REC N/A 10/15/2020    Lt Atrial Pace & Record During Ep Study performed by Sesar Forbes MD at [address] [facility] CATH LAB    ID EPHYS EVAL W/ABLATION SUPRAVENT ARRHYTHMIA N/A 10/15/2020    ABLATION A-FLUTTER performed by Sesar Forbes MD at [facility] [facility] CATH LAB    ID INTRACARDIAC ELECTROPHYSIOLOGIC 3D MAPPING N/A 10/15/2020    EP 3D MAPPING performed by Sesar Forbes MD at [address] [facility] CATH LAB          ALLERGIES     Allergies   Allergen Reactions    Ciprofloxacin Other (comments)     \"Joint pain\"          FAMILY HISTORY     Family History   Problem Relation Age of Onset    Stroke Mother     Heart Disease Father     negative for cardiac disease       SOCIAL HISTORY     Social History     Socioeconomic History    Marital status:      Spouse name: Not on file    Number of children: Not on file    Years of education: Not on file    Highest education level: Not on file   Tobacco Use    Smoking status: Never Smoker    Smokeless tobacco: Never Used   Substance and Sexual Activity    Alcohol use: Yes     Alcohol/week: 3.0 standard drinks     Types: 3 Cans of beer per week    Drug use: No         MEDICATIONS     Current Outpatient Medications   Medication Sig    atorvastatin (LIPITOR) 10 mg tablet Take 10 mg by mouth daily.  rivaroxaban (Xarelto) 20 mg tab tablet Take 1 Tab by mouth daily (with breakfast).  lisinopriL (PRINIVIL, ZESTRIL) 10 mg tablet Take 1 Tab by mouth daily.  rivaroxaban (XARELTO) 20 mg tab tablet Take 1 Tab by mouth daily.  aspirin delayed-release 81 mg tablet Take 81 mg by mouth daily.  multivitamin (ONE A DAY) tablet Take 1 Tab by mouth daily.  psyllium (METAMUCIL) packet Take 1 Packet by mouth two (2) times a day. No current facility-administered medications for this visit. I have reviewed the nurses notes, vitals, problem list, allergy list, medical history, family, social history and medications. REVIEW OF SYMPTOMS      General: Pt denies excessive weight gain or loss. Pt is able to conduct ADL's  HEENT: Denies blurred vision, headaches, hearing loss, epistaxis and difficulty swallowing. Respiratory: Denies cough, congestion, shortness of breath, BELTRAN, wheezing or stridor.   Cardiovascular: Denies precordial pain, palpitations, edema or PND  Gastrointestinal: Denies poor appetite, indigestion, abdominal pain or blood in stool  Genitourinary: Denies hematuria, dysuria, increased urinary frequency  Musculoskeletal: Denies joint pain or swelling from muscles or joints  Neurologic: Denies tremor, paresthesias, headache, or sensory motor disturbance  Psychiatric: Denies confusion, insomnia, depression  Integumentray: Denies rash, itching or ulcers. Hematologic: Denies easy bruising, bleeding       PHYSICAL EXAMINATION      Vitals: see vitals section  General: Well developed, in no acute distress. HEENT: No jaundice, oral mucosa moist, no oral ulcers  Neck: Supple, no stiffness, no lymphadenopathy, supple  Heart:  Normal S1/S2 negative S3 or S4. Regular, no murmur, gallop or rub, no jugular venous distention  Respiratory: Clear bilaterally x 4, no wheezing or rales  Abdomen:   Soft, non-tender, bowel sounds are active. Extremities:  No edema, normal cap refill, no cyanosis. Musculoskeletal: No clubbing, no deformities  Neuro: A&Ox3, speech clear, gait stable, cooperative, no focal neurologic deficits  Skin: Skin color is normal. No rashes or lesions. Non diaphoretic, moist.  Vascular: 2+ pulses symmetric in all extremities       DIAGNOSTIC DATA      EKG:        LABORATORY DATA      Lab Results   Component Value Date/Time    WBC 6.3 02/25/2021 08:08 AM    HGB 14.2 02/25/2021 08:08 AM    HCT 44.2 02/25/2021 08:08 AM    PLATELET 075 91/86/4261 08:08 AM    MCV 85.0 02/25/2021 08:08 AM      Lab Results   Component Value Date/Time    Sodium 140 02/25/2021 03:21 PM    Potassium 4.8 02/25/2021 03:21 PM    Chloride 107 02/25/2021 03:21 PM    CO2 28 02/25/2021 03:21 PM    Anion gap 5 02/25/2021 03:21 PM    Glucose 97 02/25/2021 03:21 PM    BUN 22 (H) 02/25/2021 03:21 PM    Creatinine 1.20 02/25/2021 03:21 PM    BUN/Creatinine ratio 18 02/25/2021 03:21 PM    GFR est AA >60 02/25/2021 03:21 PM    GFR est non-AA 60 (L) 02/25/2021 03:21 PM    Calcium 9.0 02/25/2021 03:21 PM    Bilirubin, total 0.3 11/25/2015 08:24 AM    Alk.  phosphatase 86 11/25/2015 08:24 AM    Protein, total 6.1 (L) 11/25/2015 08:24 AM    Albumin 3.5 11/25/2015 08:24 AM Globulin 2.6 11/25/2015 08:24 AM    A-G Ratio 1.3 11/25/2015 08:24 AM    ALT (SGPT) 37 11/25/2015 08:24 AM           ASSESSMENT      1. Atrial flutter                        S/p ablation  2. Hypertension  3. DG  4. GERD  5. Atrial fibrillation   6. Diverticulosis/diverticulitis             H. S/p partial colectomy       PLAN     Recommend dual chamber pacemaker implantation using conscious sedation followed by addition of drug therapy for rate control of AF. ICD-10-CM ICD-9-CM    1. Atrial fibrillation, unspecified type ([address]  I48.91 427.31    2. Atrial flutter, unspecified type ([address]  I48.92 427.32    3. S/P ablation of atrial flutter  Z98.890 V45.89     Z86.79     4. Essential hypertension  I10 401.9    5. Incomplete RBBB  I45.10 426.4    6. Bradycardia  R00.1 427.89      No orders of the defined types were placed in this encounter. FOLLOW-UP       Thank you, Lord Sweta MD for allowing me to participate in the care of this extraordinarily pleasant male. Please do not hesitate to contact me for further questions/concerns.          Fanta Olmos MD  Cardiac Electrophysiology / Cardiology    [address]  [address] [facility], [facility], [place] [address]    [facility]  [phone] / [phone] Fax   [phone] / [phone] Fax

## 2021-03-30 ENCOUNTER — PREP FOR PROCEDURE (OUTPATIENT)
Dept: CARDIOLOGY CLINIC | Age: 71
End: 2021-03-30

## 2021-03-30 RX ORDER — SODIUM CHLORIDE 0.9 % (FLUSH) 0.9 %
5-40 SYRINGE (ML) INJECTION EVERY 8 HOURS
Status: CANCELLED | OUTPATIENT
Start: 2021-03-30

## 2021-03-30 RX ORDER — SODIUM CHLORIDE 0.9 % (FLUSH) 0.9 %
5-40 SYRINGE (ML) INJECTION AS NEEDED
Status: CANCELLED | OUTPATIENT
Start: 2021-03-30

## 2021-04-12 ENCOUNTER — APPOINTMENT (OUTPATIENT)
Dept: GENERAL RADIOLOGY | Age: 71
End: 2021-04-12
Attending: INTERNAL MEDICINE
Payer: COMMERCIAL

## 2021-04-12 ENCOUNTER — HOSPITAL ENCOUNTER (OUTPATIENT)
Age: 71
Setting detail: OUTPATIENT SURGERY
Discharge: HOME | End: 2021-04-12
Attending: INTERNAL MEDICINE | Admitting: INTERNAL MEDICINE
Payer: COMMERCIAL

## 2021-04-12 VITALS
HEART RATE: 60 BPM | SYSTOLIC BLOOD PRESSURE: 144 MMHG | WEIGHT: 209.5 LBS | BODY MASS INDEX: 26.89 KG/M2 | HEIGHT: 74 IN | TEMPERATURE: 97.7 F | OXYGEN SATURATION: 99 % | RESPIRATION RATE: 16 BRPM | DIASTOLIC BLOOD PRESSURE: 69 MMHG

## 2021-04-12 DIAGNOSIS — R00.1 SEVERE SINUS BRADYCARDIA: ICD-10-CM

## 2021-04-12 PROCEDURE — 77030018729 HC ELECTRD DEFIB PAD CARD -B: Performed by: INTERNAL MEDICINE

## 2021-04-12 PROCEDURE — 77030018547 HC SUT ETHBND1 J&J -B: Performed by: INTERNAL MEDICINE

## 2021-04-12 PROCEDURE — 33208 INSRT HEART PM ATRIAL & VENT: CPT | Performed by: INTERNAL MEDICINE

## 2021-04-12 PROCEDURE — 71045 X-RAY EXAM CHEST 1 VIEW: CPT

## 2021-04-12 PROCEDURE — 74011000636 HC RX REV CODE- 636: Performed by: INTERNAL MEDICINE

## 2021-04-12 PROCEDURE — 77030033138 HC SUT PGA STRATFX J&J -B: Performed by: INTERNAL MEDICINE

## 2021-04-12 PROCEDURE — 99153 MOD SED SAME PHYS/QHP EA: CPT | Performed by: INTERNAL MEDICINE

## 2021-04-12 PROCEDURE — C1785 PMKR, DUAL, RATE-RESP: HCPCS | Performed by: INTERNAL MEDICINE

## 2021-04-12 PROCEDURE — 99152 MOD SED SAME PHYS/QHP 5/>YRS: CPT | Performed by: INTERNAL MEDICINE

## 2021-04-12 PROCEDURE — 77030037713 HC CLOSR DEV INCIS ZIP STRY -B: Performed by: INTERNAL MEDICINE

## 2021-04-12 PROCEDURE — 74011250636 HC RX REV CODE- 250/636: Performed by: INTERNAL MEDICINE

## 2021-04-12 PROCEDURE — 77030002933 HC SUT MCRYL J&J -A: Performed by: INTERNAL MEDICINE

## 2021-04-12 PROCEDURE — 77030041279 HC DRSG PRMSL AG MDII -B: Performed by: INTERNAL MEDICINE

## 2021-04-12 PROCEDURE — C1893 INTRO/SHEATH, FIXED,NON-PEEL: HCPCS | Performed by: INTERNAL MEDICINE

## 2021-04-12 PROCEDURE — C1898 LEAD, PMKR, OTHER THAN TRANS: HCPCS | Performed by: INTERNAL MEDICINE

## 2021-04-12 PROCEDURE — 77030038613 HC SUT PDS STRATA SPIRL J&J -B: Performed by: INTERNAL MEDICINE

## 2021-04-12 PROCEDURE — 74011000250 HC RX REV CODE- 250: Performed by: INTERNAL MEDICINE

## 2021-04-12 DEVICE — PACE/SENSE LEAD
Type: IMPLANTABLE DEVICE | Status: FUNCTIONAL
Brand: INGEVITY™+

## 2021-04-12 DEVICE — PACEMAKER
Type: IMPLANTABLE DEVICE | Status: FUNCTIONAL
Brand: ACCOLADE™ MRI EL DR

## 2021-04-12 RX ORDER — CEPHALEXIN 500 MG/1
500 CAPSULE ORAL 3 TIMES DAILY
Qty: 15 CAP | Refills: 0 | Status: SHIPPED | OUTPATIENT
Start: 2021-04-12 | End: 2021-04-17

## 2021-04-12 RX ORDER — AMOXICILLIN 875 MG/1
875 TABLET, FILM COATED ORAL 2 TIMES DAILY
Status: ON HOLD | COMMUNITY
End: 2021-04-23

## 2021-04-12 RX ORDER — MIDAZOLAM HYDROCHLORIDE 1 MG/ML
INJECTION, SOLUTION INTRAMUSCULAR; INTRAVENOUS AS NEEDED
Status: DISCONTINUED | OUTPATIENT
Start: 2021-04-12 | End: 2021-04-12 | Stop reason: HOSPADM

## 2021-04-12 RX ORDER — SODIUM CHLORIDE 0.9 % (FLUSH) 0.9 %
5-40 SYRINGE (ML) INJECTION AS NEEDED
Status: DISCONTINUED | OUTPATIENT
Start: 2021-04-12 | End: 2021-04-13 | Stop reason: HOSPADM

## 2021-04-12 RX ORDER — FENTANYL CITRATE 50 UG/ML
INJECTION, SOLUTION INTRAMUSCULAR; INTRAVENOUS AS NEEDED
Status: DISCONTINUED | OUTPATIENT
Start: 2021-04-12 | End: 2021-04-12 | Stop reason: HOSPADM

## 2021-04-12 RX ORDER — BUPIVACAINE HYDROCHLORIDE 5 MG/ML
INJECTION, SOLUTION EPIDURAL; INTRACAUDAL AS NEEDED
Status: DISCONTINUED | OUTPATIENT
Start: 2021-04-12 | End: 2021-04-12 | Stop reason: HOSPADM

## 2021-04-12 RX ORDER — SODIUM CHLORIDE 0.9 % (FLUSH) 0.9 %
5-40 SYRINGE (ML) INJECTION EVERY 8 HOURS
Status: DISCONTINUED | OUTPATIENT
Start: 2021-04-12 | End: 2021-04-13 | Stop reason: HOSPADM

## 2021-04-12 RX ORDER — ACETAMINOPHEN 325 MG/1
650 TABLET ORAL
Status: DISCONTINUED | OUTPATIENT
Start: 2021-04-12 | End: 2021-04-13 | Stop reason: HOSPADM

## 2021-04-12 RX ORDER — GENTAMICIN SULFATE 80 MG/100ML
80 INJECTION, SOLUTION INTRAVENOUS ONCE
Status: COMPLETED | OUTPATIENT
Start: 2021-04-12 | End: 2021-04-12

## 2021-04-12 RX ORDER — DIPHENHYDRAMINE HYDROCHLORIDE 50 MG/ML
INJECTION, SOLUTION INTRAMUSCULAR; INTRAVENOUS AS NEEDED
Status: DISCONTINUED | OUTPATIENT
Start: 2021-04-12 | End: 2021-04-12 | Stop reason: HOSPADM

## 2021-04-12 RX ORDER — CEFAZOLIN SODIUM 1 G/3ML
INJECTION, POWDER, FOR SOLUTION INTRAMUSCULAR; INTRAVENOUS AS NEEDED
Status: DISCONTINUED | OUTPATIENT
Start: 2021-04-12 | End: 2021-04-12 | Stop reason: HOSPADM

## 2021-04-12 RX ORDER — HYDROCODONE BITARTRATE AND ACETAMINOPHEN 5; 325 MG/1; MG/1
1 TABLET ORAL
Status: DISCONTINUED | OUTPATIENT
Start: 2021-04-12 | End: 2021-04-13 | Stop reason: HOSPADM

## 2021-04-12 RX ORDER — LIDOCAINE HYDROCHLORIDE AND EPINEPHRINE 10; 10 MG/ML; UG/ML
INJECTION, SOLUTION INFILTRATION; PERINEURAL AS NEEDED
Status: DISCONTINUED | OUTPATIENT
Start: 2021-04-12 | End: 2021-04-12 | Stop reason: HOSPADM

## 2021-04-12 RX ORDER — ONDANSETRON 2 MG/ML
4 INJECTION INTRAMUSCULAR; INTRAVENOUS
Status: DISCONTINUED | OUTPATIENT
Start: 2021-04-12 | End: 2021-04-13 | Stop reason: HOSPADM

## 2021-04-12 NOTE — ROUTINE PROCESS
11:26 AM 
 
Patient arrived. ID and allergies verified verbally with patient. Pt voices understanding of procedure to be performed. Consent obtained. Pt prepped for procedure. 1:30 PM 
 
TRANSFER - IN REPORT: 
 
Verbal report received from Fabián Bardales (marly) on Albino Reil.  being received from EP Lab (unit) for routine post - op Report consisted of patients Situation, Background, Assessment and  
Recommendations(SBAR). Information from the following report(s) Procedure Summary was reviewed with the receiving nurse. Opportunity for questions and clarification was provided. Assessment completed upon patients arrival to unit and care assumed. 2:15 PM 
 
Discharge instructions reviewed with patient and family. Voiced understanding. Patient given copy of discharge instructions to take home. 2:45 PM 
 
Pt discharged via wheelchair with family. Personal belongings with patient upon discharge.

## 2021-04-12 NOTE — Clinical Note
TRANSFER - OUT REPORT:     Verbal report given to: Pritesh. Report consisted of patient's Situation, Background, Assessment and   Recommendations(SBAR). Opportunity for questions and clarification was provided. Patient transported with a Registered Nurse.

## 2021-04-12 NOTE — DISCHARGE INSTRUCTIONS
Pacemaker  Discharge Instructions    Please make sure you have received your Temporary Pacemaker identification card with your discharge instructions      MEDICATIONS         Take only the medications prescribed to you at discharge.  You are prescribed an antibiotic to take for 5 days. Please do not miss doses of this prescription. ACTIVITY         Return to your normal activity, except as noted below. o Do not lift anything heavier than 10 pounds for 4 weeks with the affected arm. This is how long it takes the muscles to heal, and the leads inside your heart to stabilize their position. o Do not reach above your head with the affected arm for 4 weeks, doing so increases the risk of lead dislodgement.    o It is, however, important to move the affected arm to prevent shoulder stiffness and locking. o Avoid tight clothes or unnecessary pressure over your incision (such as bra straps or seat belts). If it is tender or sensitive to clothing, cover the incision with a soft dressing or pad.  o Questions about driving are individualized and should be discussed with one of the EP Physicians prior to discharge. SHOWERING         Leave the bandage over your incision until your clinic follow up in 10-14 days after the Pacemaker implant. You bandage will be removed in clinic during that appointment.  It is important to keep the bandaged area clean and dry. You may shower around the site until the bandage is removed in clinic. Thereafter, you may shower after the bandage is removed, washing it gently with soap and water. Do not apply any lotions, powders, or perfumes to the incision line.  Avoid submerging your incision in water (tub baths, hot tubs, or swimming) for four weeks.  Underneath the dressing.  o You will most likely have a Zipline dressing over the incision. This is made with plastic zip ties to hold the incision together and promote better healing.  You may note dried blood around this dressing which is normal. Do not attempt to pull this off.   o If you have white steri-strips over your incision (underneath the gauze dressing), they will curl up at the end and fall off, usually within 10 days. Do not pull them off.  - OR -   o You may have a different type of closure for the incision including a dermabond adhesive which will slowly peel and come off on its own once you are able to shower. DISCHARGE PRECAUTIONS         Record your temperature every day, at the same time, until your 10-14 day follow up. A temperature of 100.5 F, or higher, can be the first sign of infection. This should be reported to your Doctor immediately.  You can have an MRI after 6 weeks. You must be aware that any strong magnet or magnetic field can affect your Pacemaker. In general, be careful of metal detectors, heavy machinery, and any area where arc-welding is performed. When approaching a security checkpoint show your Pacemaker ID Card to security personnel.  Always tell your doctor or dentist that you have a Pacemaker. In some cases, antibiotics may be prescribed before certain procedures.  Your temporary identification will be given to you with these instructions. Keep your Pacemaker card in your wallet or on your person at all times. You should receive your permanent card, although this may take up to 8-12 weeks. If you do not receive your permanent card, please call the office at [phone] or the phone number provided on your temporary card for the pacemaker company. TAKING YOUR PULSE         Take your pulse the same time every day, preferably in the morning, until your follow up.  Sit down and rest for 5 minutes prior to taking your pulse.  Take your pulse for 1 full minute, use a clock or stop watch with a second hand.  To feel your pulse, use the first two fingers of one hand; place them on the thumb side of the wrist of the opposite hand.   The pulse will be steady, regular and throbbing.  Call the physician if your pulse is less than 40 beats per minute. SYMPTOMS THAT NEED TO BE REPORTED IMMEDIATELY         Temperature more than 100.4 F     Redness or warmth at the incision site, or pain for longer than the first 5 days after the implant.  Drainage from the incision site.  Swelling around the incision site.  Shortness of breath.  Rapid heart rate or palpitations.  Dizziness, lightheadedness, fainting.  Slow pulse below 40 beats per minute.  REMEMBER: If you feel something is an emergency or cannot be handled over the phone, call 911 or go to the closest emergency room.       Myranda Dial MD  Cardiac Electrophysiology / Cardiology    [address], [address] [facility], [address]  Samira Friedman          Trinh Rice  [phone] / [phone] Fax       [phone] / [phone] Fax

## 2021-04-12 NOTE — H&P
HISTORY OF PRESENTING ILLNESS      Shashank Avendaño is a 66 y.o. male who underwent left atrial appendage occlusion recently however noted to have recurrent prolonged greater than 3-second pauses on monitoring. He was noted to have a 4.4-second pause at 1 AM on his monitor however was also noted to have pauses on telemetry when recently hospitalized following his procedure.   He also had an episode of AF with RVR with heart rates in the 120 to 130 bpm range.         PAST MEDICAL HISTORY           Past Medical History:   Diagnosis Date    Agatston CAC score, <100 06/10/2015     Coronary calcium score 4.    Arthritis      DDD (degenerative disc disease), thoracic      Degenerative arthritis of thumb       Left    Diverticulitis      Diverticulosis of colon 09/29/2016     of the descending colon; moderate    GERD (gastroesophageal reflux disease)      Incomplete RBBB      Numbness       Right hand    Polyp of ascending colon 09/29/2016     polyps (5mm) in the ascenidning colon and transverse colon    Pruritus ani      Sinus headache               PAST SURGICAL HISTORY            Past Surgical History:   Procedure Laterality Date    ENDOSCOPY, COLON, DIAGNOSTIC   2005    HX COLONOSCOPY   12/13    HX OTHER SURGICAL         Removal of colon polyps    HX TOTAL COLECTOMY   2005     colectomy for diverticulitis    [place] ABDOMEN SURGERY PROC UNLISTED   12/12     exploratory, small bowel obstruction, internal hernia    [place] COMPRE ELECTROPHYSIOL XM W/LEFT ATRIAL PACNG/REC N/A 10/15/2020     Lt Atrial Pace & Record During Ep Study performed by Oscar Garcia MD at [address] [place] [address] CATH LAB    [place] EPHYS EVAL W/ABLATION SUPRAVENT ARRHYTHMIA N/A 10/15/2020     ABLATION A-FLUTTER performed by Oscar Garcia MD at [facility] CATH LAB    [place] INTRACARDIAC ELECTROPHYSIOLOGIC 3D MAPPING N/A 10/15/2020     EP 3D MAPPING performed by Oscar Garcia MD at [address] CATH LAB             ALLERGIES     Allergies   Allergen Reactions    Ciprofloxacin Other (comments)       \"Joint pain\"            FAMILY HISTORY            Family History   Problem Relation Age of Onset    Stroke Mother      Heart Disease Father      negative for cardiac disease         SOCIAL HISTORY      Social History               Socioeconomic History    Marital status:        Spouse name: Not on file    Number of children: Not on file    Years of education: Not on file    Highest education level: Not on file   Tobacco Use    Smoking status: Never Smoker    Smokeless tobacco: Never Used   Substance and Sexual Activity    Alcohol use: Yes       Alcohol/week: 3.0 standard drinks       Types: 3 Cans of beer per week    Drug use: No               MEDICATIONS           Current Outpatient Medications   Medication Sig    atorvastatin (LIPITOR) 10 mg tablet Take 10 mg by mouth daily.  rivaroxaban (Xarelto) 20 mg tab tablet Take 1 Tab by mouth daily (with breakfast).  lisinopriL (PRINIVIL, ZESTRIL) 10 mg tablet Take 1 Tab by mouth daily.  rivaroxaban (XARELTO) 20 mg tab tablet Take 1 Tab by mouth daily.  aspirin delayed-release 81 mg tablet Take 81 mg by mouth daily.  multivitamin (ONE A DAY) tablet Take 1 Tab by mouth daily.  psyllium (METAMUCIL) packet Take 1 Packet by mouth two (2) times a day.      No current facility-administered medications for this visit.          I have reviewed the nurses notes, vitals, problem list, allergy list, medical history, family, social history and medications.         REVIEW OF SYMPTOMS      General: Pt denies excessive weight gain or loss. Pt is able to conduct ADL's  HEENT: Denies blurred vision, headaches, hearing loss, epistaxis and difficulty swallowing. Respiratory: Denies cough, congestion, shortness of breath, BELTRAN, wheezing or stridor.   Cardiovascular: Denies precordial pain, palpitations, edema or PND  Gastrointestinal: Denies poor appetite, indigestion, abdominal pain or blood in stool  Genitourinary: Denies hematuria, dysuria, increased urinary frequency  Musculoskeletal: Denies joint pain or swelling from muscles or joints  Neurologic: Denies tremor, paresthesias, headache, or sensory motor disturbance  Psychiatric: Denies confusion, insomnia, depression  Integumentray: Denies rash, itching or ulcers. Hematologic: Denies easy bruising, bleeding         PHYSICAL EXAMINATION      Vitals: see vitals section  General: Well developed, in no acute distress. HEENT: No jaundice, oral mucosa moist, no oral ulcers  Neck: Supple, no stiffness, no lymphadenopathy, supple  Heart:  Normal S1/S2 negative S3 or S4. Regular, no murmur, gallop or rub, no jugular venous distention  Respiratory: Clear bilaterally x 4, no wheezing or rales  Abdomen:   Soft, non-tender, bowel sounds are active.   Extremities:  No edema, normal cap refill, no cyanosis. Musculoskeletal: No clubbing, no deformities  Neuro: A&Ox3, speech clear, gait stable, cooperative, no focal neurologic deficits  Skin: Skin color is normal. No rashes or lesions.  Non diaphoretic, moist.  Vascular: 2+ pulses symmetric in all extremities         DIAGNOSTIC DATA      EKG:          LABORATORY DATA            Lab Results   Component Value Date/Time     WBC 6.3 02/25/2021 08:08 AM     HGB 14.2 02/25/2021 08:08 AM     HCT 44.2 02/25/2021 08:08 AM     PLATELET 834 35/60/4840 08:08 AM     MCV 85.0 02/25/2021 08:08 AM            Lab Results   Component Value Date/Time     Sodium 140 02/25/2021 03:21 PM     Potassium 4.8 02/25/2021 03:21 PM     Chloride 107 02/25/2021 03:21 PM     CO2 28 02/25/2021 03:21 PM     Anion gap 5 02/25/2021 03:21 PM     Glucose 97 02/25/2021 03:21 PM     BUN 22 (H) 02/25/2021 03:21 PM     Creatinine 1.20 02/25/2021 03:21 PM     BUN/Creatinine ratio 18 02/25/2021 03:21 PM     GFR est AA >60 02/25/2021 03:21 PM     GFR est non-AA 60 (L) 02/25/2021 03:21 PM     Calcium 9.0 02/25/2021 03:21 PM     Bilirubin, total 0.3 11/25/2015 08:24 AM     Alk. phosphatase 86 11/25/2015 08:24 AM     Protein, total 6.1 (L) 11/25/2015 08:24 AM     Albumin 3.5 11/25/2015 08:24 AM     Globulin 2.6 11/25/2015 08:24 AM     A-G Ratio 1.3 11/25/2015 08:24 AM     ALT (SGPT) 37 11/25/2015 08:24 AM             ASSESSMENT      1. Atrial flutter                        S/p ablation  2. Hypertension  3. DG  4. GERD  5. Atrial fibrillation   6. Diverticulosis/diverticulitis             D.  S/p partial colectomy         PLAN      Recommend dual chamber pacemaker implantation      Vickki Lesches, MD  Cardiac Electrophysiology / Cardiology     [address] [place] [address], [address], [address]  Samira Friedman                                          Brian Rice  [phone] / [phone] Fax                                    [phone] / [phone] Fax

## 2021-04-12 NOTE — Clinical Note
Bilateral chest clipped prepped with ChloraPrep and draped. Wet prep solution applied at: 1239. Wet prep solution dried at: 1242. Wet prep elapsed drying time: 3 mins.

## 2021-04-12 NOTE — Clinical Note
TRANSFER - IN REPORT:     Verbal report received from: Pritesh. Report consisted of patient's Situation, Background, Assessment and   Recommendations(SBAR). Opportunity for questions and clarification was provided. Assessment completed upon patient's arrival to unit and care assumed. Patient transported with a Registered Nurse.

## 2021-04-13 ENCOUNTER — TELEPHONE (OUTPATIENT)
Dept: CARDIOLOGY CLINIC | Age: 71
End: 2021-04-13

## 2021-04-13 NOTE — TELEPHONE ENCOUNTER
Patients wife is calling as the patient was started on Keflex yesterday following a pacemaker procedure. Patient was also taking amoxicillin for an inner ear infection. The pharmacists states that the patient should not take them together but the patients wife would like to verify what the patient should in fact be doing. Patients wife would like to also ensure when the patient is allowed to resume driving. Please advise.      Phone: [phone]

## 2021-04-13 NOTE — TELEPHONE ENCOUNTER
Returned patient wife's call, ID verified using two patient identifiers. Asked how many days he has left on the Amoxicillin. Per patient he has two days left. He stopped the amoxicillin yesterday and started taking the Keflex. He would like to know it that is ok. Advised him that I will let Dr. Ernst Mendoza know and get back to them with his recommendation. Reviewed post procedure discharge instructions and restrictions. Patient verbalized understanding and will call with any other questions.

## 2021-04-15 NOTE — TELEPHONE ENCOUNTER
Called patient's wife, ID verified using two patient identifiers. Notified wife that per Dr. Pepper Habermann it was ok that he stopped the amoxicillin and continued to take the keflex. Patient verbalized understanding and will call with any other questions.       Future Appointments   Date Time Provider Marce Kristen   4/23/2021  9:30 AM [facility]   4/27/2021  1:20 PM PACEMAKER, [facility] AMB   4/27/2021  1:30 PM WOUND CHECKS, [facility] BS AMB   7/16/2021  2:00 PM PACEMAKER, [facility] BS AMB   7/16/2021  2:20 PM MarcellaChristopher MD [facility] BS AMB   10/12/2021  8:45 AM REMOTE1, [facility] BS AMB

## 2021-04-19 ENCOUNTER — TRANSCRIBE ORDER (OUTPATIENT)
Dept: REGISTRATION | Age: 71
End: 2021-04-19

## 2021-04-19 ENCOUNTER — HOSPITAL ENCOUNTER (OUTPATIENT)
Dept: LAB | Age: 71
Discharge: HOME | End: 2021-04-19
Payer: COMMERCIAL

## 2021-04-19 DIAGNOSIS — Z01.812 ENCOUNTER FOR PREOPERATIVE SCREENING LABORATORY TESTING FOR COVID-19 VIRUS: Primary | ICD-10-CM

## 2021-04-19 DIAGNOSIS — Z01.812 ENCOUNTER FOR PREOPERATIVE SCREENING LABORATORY TESTING FOR COVID-19 VIRUS: ICD-10-CM

## 2021-04-19 DIAGNOSIS — Z20.822 ENCOUNTER FOR PREOPERATIVE SCREENING LABORATORY TESTING FOR COVID-19 VIRUS: Primary | ICD-10-CM

## 2021-04-19 DIAGNOSIS — Z20.822 ENCOUNTER FOR PREOPERATIVE SCREENING LABORATORY TESTING FOR COVID-19 VIRUS: ICD-10-CM

## 2021-04-19 PROCEDURE — U0003 INFECTIOUS AGENT DETECTION BY NUCLEIC ACID (DNA OR RNA); SEVERE ACUTE RESPIRATORY SYNDROME CORONAVIRUS 2 (SARS-COV-2) (CORONAVIRUS DISEASE [COVID-19]), AMPLIFIED PROBE TECHNIQUE, MAKING USE OF HIGH THROUGHPUT TECHNOLOGIES AS DESCRIBED BY CMS-2020-01-R: HCPCS

## 2021-04-20 LAB — SARS-COV-2, COV2NT: NOT DETECTED

## 2021-04-23 ENCOUNTER — HOSPITAL ENCOUNTER (OUTPATIENT)
Dept: CARDIAC CATH/INVASIVE PROCEDURES | Age: 71
Discharge: HOME | End: 2021-04-23
Attending: INTERNAL MEDICINE | Admitting: INTERNAL MEDICINE
Payer: COMMERCIAL

## 2021-04-23 VITALS
RESPIRATION RATE: 16 BRPM | HEIGHT: 74 IN | TEMPERATURE: 98.4 F | BODY MASS INDEX: 27.21 KG/M2 | DIASTOLIC BLOOD PRESSURE: 64 MMHG | SYSTOLIC BLOOD PRESSURE: 138 MMHG | WEIGHT: 212 LBS | HEART RATE: 60 BPM | OXYGEN SATURATION: 98 %

## 2021-04-23 DIAGNOSIS — I48.91 ATRIAL FIBRILLATION, UNSPECIFIED TYPE (HCC): ICD-10-CM

## 2021-04-23 PROCEDURE — 93325 DOPPLER ECHO COLOR FLOW MAPG: CPT | Performed by: INTERNAL MEDICINE

## 2021-04-23 PROCEDURE — 93312 ECHO TRANSESOPHAGEAL: CPT | Performed by: INTERNAL MEDICINE

## 2021-04-23 PROCEDURE — 74011250636 HC RX REV CODE- 250/636: Performed by: INTERNAL MEDICINE

## 2021-04-23 PROCEDURE — 93325 DOPPLER ECHO COLOR FLOW MAPG: CPT

## 2021-04-23 PROCEDURE — 93320 DOPPLER ECHO COMPLETE: CPT | Performed by: INTERNAL MEDICINE

## 2021-04-23 RX ORDER — FENTANYL CITRATE 50 UG/ML
INJECTION, SOLUTION INTRAMUSCULAR; INTRAVENOUS AS NEEDED
Status: DISCONTINUED | OUTPATIENT
Start: 2021-04-23 | End: 2021-04-23 | Stop reason: HOSPADM

## 2021-04-23 RX ORDER — MIDAZOLAM HYDROCHLORIDE 1 MG/ML
.5-1 INJECTION, SOLUTION INTRAMUSCULAR; INTRAVENOUS AS NEEDED
Status: DISCONTINUED | OUTPATIENT
Start: 2021-04-23 | End: 2021-04-23 | Stop reason: HOSPADM

## 2021-04-23 RX ORDER — CLOPIDOGREL BISULFATE 75 MG/1
75 TABLET ORAL DAILY
Qty: 90 TAB | Refills: 2 | Status: SHIPPED | OUTPATIENT
Start: 2021-04-23 | End: 2021-05-13 | Stop reason: SDUPTHER

## 2021-04-23 RX ADMIN — MIDAZOLAM HYDROCHLORIDE 3 MG: 1 INJECTION, SOLUTION INTRAMUSCULAR; INTRAVENOUS at 09:27

## 2021-04-23 RX ADMIN — MIDAZOLAM HYDROCHLORIDE 2 MG: 1 INJECTION, SOLUTION INTRAMUSCULAR; INTRAVENOUS at 09:24

## 2021-04-23 RX ADMIN — FENTANYL CITRATE 50 MCG: 0.05 INJECTION, SOLUTION INTRAMUSCULAR; INTRAVENOUS at 09:23

## 2021-04-23 RX ADMIN — FENTANYL CITRATE 50 MCG: 0.05 INJECTION, SOLUTION INTRAMUSCULAR; INTRAVENOUS at 09:26

## 2021-04-23 NOTE — PROGRESS NOTES
TRANSFER - IN REPORT:    Verbal report received from Martha Guzmán RN(name) on Jude Valencia.  being received from EP lab(unit) for routine progression of care      Report consisted of patients Situation, Background, Assessment and   Recommendations(SBAR). Information from the following report(s) Procedure Summary was reviewed with the receiving nurse. Opportunity for questions and clarification was provided. Assessment completed upon patients arrival to unit and care assumed. 0940: Patient resting quietly in bed. VS stable. Patient with no complaints at this time. 1000: Discharge instructions and prescriptions reviewed with patient's wife. Opportunity provided for questions. Wife verbalized understanding. 1025: Discharge instructions and prescriptions reviewed with patient. Opportunity provided for questions. Patient verbalized understanding. Signed copy of discharge placed in the front of patient's chart. 1030: Patient gag tested. Reflex back. Assisted with ice chips. 1032: Patient dangled on the side of the bed. Patient with no complaints at this time. 1034: Patient ambulated to bathroom. Voided. Gait steady. No complaints at this time. 1040: IV and tele removed. Patient escorted via wheelchair to entrance. Patient wife driving. Patient discharged into care of wife.

## 2021-04-23 NOTE — PROGRESS NOTES
TRANSFER - OUT REPORT:    Verbal report given to [facility]) on Diana Robin.  being transferred to EP lab(unit) for ordered procedure       Report consisted of patients Situation, Background, Assessment and   Recommendations(SBAR). Information from the following report(s) SBAR was reviewed with the receiving nurse. Lines:   Peripheral IV 04/23/21 Right Antecubital (Active)   Site Assessment Clean, dry, & intact 04/23/21 0820   Phlebitis Assessment 0 04/23/21 0820   Infiltration Assessment 0 04/23/21 0820   Dressing Status Clean, dry, & intact 04/23/21 0820   Dressing Type Transparent 04/23/21 0820   Hub Color/Line Status Pink;Flushed; Infusing 04/23/21 0820        Opportunity for questions and clarification was provided.       Patient transported with:   Registered Nurse

## 2021-04-23 NOTE — DISCHARGE INSTRUCTIONS
Patient Education        Transesophageal Echocardiogram: What to Expect at Home  Your Recovery  A transesophageal echocardiogram is a test to help your doctor look at the inside of your heart. A small device called a transducer directs sound waves toward your heart. The sound waves make a picture of the heart's valves and chambers. Before the test, your throat was sprayed with medicine to numb it. Your throat may be sore for a few days. You may have had a sedative to help you relax. You may be unsteady after having sedation. It can take a few hours for the medicine's effects to wear off. Common side effects include nausea, vomiting, and feeling sleepy or tired. This care sheet gives you a general idea about how long it will take for you to recover. But each person recovers at a different pace. Follow the steps below to feel better as quickly as possible. How can you care for yourself at home? Activity    · If a sedative was used, your doctor will tell you when it is safe for you to do your normal activities.     · For your safety, do not drive or operate any machinery that could be dangerous. Wait until the medicine wears off and you can think clearly and react easily. Diet    · Do not eat or drink until the numbness in your throat wears off.     · When the numbness is gone, you can eat your normal diet.     · Throat lozenges and warm saltwater gargles can help relieve throat soreness. Throat lozenges can be used by people age 3 or older. And most people can gargle at age 6 and older.     · Do not drink alcohol for 24 hours. Follow-up care is a key part of your treatment and safety. Be sure to make and go to all appointments, and call your doctor if you are having problems. It's also a good idea to know your test results and keep a list of the medicines you take. When should you call for help? Call 911 anytime you think you may need emergency care.  For example, call if:    · Your stools are maroon or very bloody.     · You vomit blood or what looks like coffee grounds. Call your doctor now or seek immediate medical care if:    · You have pain in your chest, belly, or back.     · You have new or worse trouble swallowing.     · You have trouble breathing. Watch closely for changes in your health, and be sure to contact your doctor if you have any problems. Where can you learn more? Go to http://www.gray.com/  Enter Y765 in the search box to learn more about \"Transesophageal Echocardiogram: What to Expect at Home. \"  Current as of: August 31, 2020               Content Version: 12.8  © 5970-6651 [employer]. Care instructions adapted under license by [employer] (which disclaims liability or warranty for this information). If you have questions about a medical condition or this instruction, always ask your healthcare professional. [address] any warranty or liability for your use of this information.

## 2021-04-23 NOTE — H&P
HISTORY OF PRESENTING ILLNESS      Jeremias Thompson is a 66 y.o. male here for kami post watchman       PAST MEDICAL HISTORY     Past Medical History:   Diagnosis Date    Agatston CAC score, <100 06/10/2015    Coronary calcium score 4.    Arthritis     DDD (degenerative disc disease), thoracic     Degenerative arthritis of thumb     Left    Diverticulitis     Diverticulosis of colon 09/29/2016    of the descending colon; moderate    GERD (gastroesophageal reflux disease)     Incomplete RBBB     Numbness     Right hand    Polyp of ascending colon 09/29/2016    polyps (5mm) in the ascenidning colon and transverse colon    Pruritus ani     Sinus headache            PAST SURGICAL HISTORY     Past Surgical History:   Procedure Laterality Date    ENDOSCOPY, COLON, DIAGNOSTIC  2005    HX COLONOSCOPY  12/13    HX OTHER SURGICAL      Removal of colon polyps    HX TOTAL COLECTOMY  2005    colectomy for diverticulitis    [place] ABDOMEN SURGERY PROC UNLISTED  12/12    exploratory, small bowel obstruction, internal hernia    [place] COMPRE ELECTROPHYSIOL XM W/LEFT ATRIAL PACNG/REC N/A 10/15/2020    Lt Atrial Pace & Record During Ep Study performed by Tenzin Brown MD at [address] [place] [address] CATH LAB    [place] EPHYS EVAL W/ABLATION SUPRAVENT ARRHYTHMIA N/A 10/15/2020    ABLATION A-FLUTTER performed by Tenzin Brown MD at [address] [place] [address] CATH LAB    MA INS NEW/RPLCMT PRM PM W/TRANSV ELTRD ATRIAL&VENT N/A 4/12/2021    INSERT PPM DUAL/EVI performed by Tenzin Brown MD at [address] [place] [address] CATH LAB    MA INTRACARDIAC ELECTROPHYSIOLOGIC 3D MAPPING N/A 10/15/2020    EP 3D MAPPING performed by Tenzin Brown MD at [address] [place] [address] CATH LAB          ALLERGIES     Allergies   Allergen Reactions    Ciprofloxacin Other (comments)     \"Joint pain\"          FAMILY HISTORY     Family History   Problem Relation Age of Onset    Stroke Mother     Heart Disease Father     negative for cardiac disease       SOCIAL HISTORY     Social History Socioeconomic History    Marital status:      Spouse name: Not on file    Number of children: Not on file    Years of education: Not on file    Highest education level: Not on file   Tobacco Use    Smoking status: Never Smoker    Smokeless tobacco: Never Used   Substance and Sexual Activity    Alcohol use: Yes     Alcohol/week: 3.0 standard drinks     Types: 3 Cans of beer per week    Drug use: No         MEDICATIONS     No current facility-administered medications for this encounter. I have reviewed the nurses notes, vitals, problem list, allergy list, medical history, family, social history and medications. REVIEW OF SYMPTOMS      General: Pt denies excessive weight gain or loss. Pt is able to conduct ADL's  HEENT: Denies blurred vision, headaches, hearing loss, epistaxis and difficulty swallowing. Respiratory: Denies cough, congestion, shortness of breath, BELTRAN, wheezing or stridor. Cardiovascular: Denies precordial pain, palpitations, edema or PND  Gastrointestinal: Denies poor appetite, indigestion, abdominal pain or blood in stool  Genitourinary: Denies hematuria, dysuria, increased urinary frequency  Musculoskeletal: Denies joint pain or swelling from muscles or joints  Neurologic: Denies tremor, paresthesias, headache, or sensory motor disturbance  Psychiatric: Denies confusion, insomnia, depression  Integumentray: Denies rash, itching or ulcers. Hematologic: Denies easy bruising, bleeding       PHYSICAL EXAMINATION      Vitals: see vitals section  General: Well developed, in no acute distress. HEENT: No jaundice, oral mucosa moist, no oral ulcers  Neck: Supple, no stiffness, no lymphadenopathy, supple  Heart:  Normal S1/S2 negative S3 or S4. Regular, no murmur, gallop or rub, no jugular venous distention  Respiratory: Clear bilaterally x 4, no wheezing or rales  Abdomen:   Soft, non-tender, bowel sounds are active.    Extremities:  No edema, normal cap refill, no cyanosis. Musculoskeletal: No clubbing, no deformities  Neuro: A&Ox3, speech clear, gait stable, cooperative, no focal neurologic deficits  Skin: Skin color is normal. No rashes or lesions. Non diaphoretic, moist.  Vascular: 2+ pulses symmetric in all extremities       DIAGNOSTIC DATA      EKG:        LABORATORY DATA      Lab Results   Component Value Date/Time    WBC 6.9 04/06/2021 11:00 AM    HGB 14.1 04/06/2021 11:00 AM    HCT 45.3 04/06/2021 11:00 AM    PLATELET 877 83/40/4271 11:00 AM    MCV 85.8 04/06/2021 11:00 AM      Lab Results   Component Value Date/Time    Sodium 139 04/06/2021 11:00 AM    Potassium 4.5 04/06/2021 11:00 AM    Chloride 106 04/06/2021 11:00 AM    CO2 30 04/06/2021 11:00 AM    Anion gap 3 (L) 04/06/2021 11:00 AM    Glucose 104 (H) 04/06/2021 11:00 AM    BUN 16 04/06/2021 11:00 AM    Creatinine 0.93 04/06/2021 11:00 AM    BUN/Creatinine ratio 17 04/06/2021 11:00 AM    GFR est AA >60 04/06/2021 11:00 AM    GFR est non-AA >60 04/06/2021 11:00 AM    Calcium 9.3 04/06/2021 11:00 AM    Bilirubin, total 0.3 11/25/2015 08:24 AM    Alk. phosphatase 86 11/25/2015 08:24 AM    Protein, total 6.1 (L) 11/25/2015 08:24 AM    Albumin 3.5 11/25/2015 08:24 AM    Globulin 2.6 11/25/2015 08:24 AM    A-G Ratio 1.3 11/25/2015 08:24 AM    ALT (SGPT) 37 11/25/2015 08:24 AM           ASSESSMENT      1. AF  2. WATCHMAN  3.   4.   5.   6. PLAN     kami      ICD-10-CM ICD-9-CM    1. Atrial fibrillation, unspecified type ([facility] [address]  I48.91 427.31 ECHO KAMI W OR WO CONTRAST      ECHO KAMI W OR WO CONTRAST     No orders of the defined types were placed in this encounter. FOLLOW-UP       Thank you, Marnie Cheng MD for allowing me to participate in the care of this extraordinarily pleasant male. Please do not hesitate to contact me for further questions/concerns.          Spenser Byers MD  Cardiac Electrophysiology / Cardiology    [address] [facility] [address] [facility], [place] [address],[address]  [place], [address] [place] [address], [facility]  [phone] / [phone] Fax   [phone] / [phone] Fax

## 2021-04-23 NOTE — PROGRESS NOTES
9: 37 AM    TRANSFER - IN REPORT:    Verbal report received from Rachel Mims (name) on Pratik Pressley.  being received from EP Lab (unit) for routine post - op      Report consisted of patients Situation, Background, Assessment and   Recommendations(SBAR). Information from the following report(s) Procedure Summary was reviewed with the receiving nurse. Opportunity for questions and clarification was provided. Assessment completed upon patients arrival to unit and care assumed.

## 2021-04-27 ENCOUNTER — CLINICAL SUPPORT (OUTPATIENT)
Dept: CARDIOLOGY CLINIC | Age: 71
End: 2021-04-27

## 2021-04-27 ENCOUNTER — OFFICE VISIT (OUTPATIENT)
Dept: CARDIOLOGY CLINIC | Age: 71
End: 2021-04-27
Payer: COMMERCIAL

## 2021-04-27 DIAGNOSIS — Z51.89 VISIT FOR WOUND CHECK: Primary | ICD-10-CM

## 2021-04-27 DIAGNOSIS — Z95.0 CARDIAC PACEMAKER IN SITU: Primary | ICD-10-CM

## 2021-04-27 PROCEDURE — 99024 POSTOP FOLLOW-UP VISIT: CPT | Performed by: INTERNAL MEDICINE

## 2021-04-27 PROCEDURE — 93280 PM DEVICE PROGR EVAL DUAL: CPT | Performed by: INTERNAL MEDICINE

## 2021-04-27 NOTE — PROGRESS NOTES
Patient presents for wound check post-device implantation. The dressing was removed and the site was inspected. The site appeared to be well-healing without ecchymosis/tenderness/erythema. Denies pain, fevers, discharge. Plan:  3 month follow up as scheduled. Future Appointments   Date Time Provider Marce Kristen   7/16/2021  2:00 PM [facility]ER[facility] AMB   7/16/2021  2:20 PM Cory Naranjo MD [facility] AMB   10/12/2021  8:45 AM [address] [facility] AMB         Continue follow up in device clinic as planned.

## 2021-05-13 ENCOUNTER — TELEPHONE (OUTPATIENT)
Dept: CARDIOLOGY CLINIC | Age: 71
End: 2021-05-13

## 2021-05-13 RX ORDER — CLOPIDOGREL BISULFATE 75 MG/1
75 TABLET ORAL DAILY
Qty: 90 TAB | Refills: 2 | Status: CANCELLED | OUTPATIENT
Start: 2021-05-13

## 2021-05-13 RX ORDER — CLOPIDOGREL BISULFATE 75 MG/1
75 TABLET ORAL DAILY
Qty: 90 TAB | Refills: 1 | Status: SHIPPED | OUTPATIENT
Start: 2021-05-13

## 2021-05-13 NOTE — TELEPHONE ENCOUNTER
Patient is requesting to speak to the nurse about a miss up with his medications being sent to the wrong pharmacy, he needed a refill [facility]opidogrel [facility] but [facility] to [facility] local pharmacy [facility], please advise       [phone]

## 2021-05-13 NOTE — TELEPHONE ENCOUNTER
Returned call to patient. ID verified using two patient identifiers. Patient requesting prescription for Plavix be sent to [facility]. Original prescription was sent to [facility] per Dr. Jesús Landis on 4/23/21. Informed patient that I would send Plavix prescription to [facility] as requested. Patient also has questions about his bills. Provided patient with phone #'s for the hospital and for the office. Patient verbalizes understanding of all information. No further questions or concerns at this time.

## 2021-05-18 ENCOUNTER — OFFICE VISIT (OUTPATIENT)
Dept: CARDIOLOGY CLINIC | Age: 71
End: 2021-05-18

## 2021-05-18 DIAGNOSIS — Z95.0 CARDIAC PACEMAKER IN SITU: Primary | ICD-10-CM

## 2021-07-16 ENCOUNTER — OFFICE VISIT (OUTPATIENT)
Dept: CARDIOLOGY CLINIC | Age: 71
End: 2021-07-16
Payer: COMMERCIAL

## 2021-07-16 ENCOUNTER — OFFICE VISIT (OUTPATIENT)
Dept: CARDIOLOGY CLINIC | Age: 71
End: 2021-07-16

## 2021-07-16 VITALS
DIASTOLIC BLOOD PRESSURE: 80 MMHG | OXYGEN SATURATION: 98 % | SYSTOLIC BLOOD PRESSURE: 130 MMHG | HEART RATE: 60 BPM | BODY MASS INDEX: 28.16 KG/M2 | WEIGHT: 219.4 LBS | HEIGHT: 74 IN | RESPIRATION RATE: 18 BRPM

## 2021-07-16 DIAGNOSIS — Z95.0 CARDIAC PACEMAKER IN SITU: Primary | ICD-10-CM

## 2021-07-16 PROCEDURE — 93280 PM DEVICE PROGR EVAL DUAL: CPT | Performed by: INTERNAL MEDICINE

## 2021-07-16 PROCEDURE — 99215 OFFICE O/P EST HI 40 MIN: CPT | Performed by: INTERNAL MEDICINE

## 2021-07-16 NOTE — PROGRESS NOTES
HISTORY OF PRESENTING ILLNESS      Isak Castro is a 66 y.o. male who underwent left atrial appendage occlusion recently however noted to have recurrent prolonged greater than 3-second pauses on monitoring. He was noted to have a 4.4-second pause at 1 AM on his monitor however was also noted to have pauses on telemetry when recently hospitalized following his procedure. He also had an episode of AF with RVR with heart rates in the 120 to 130 bpm range. He underwent dual chamber pacemaker implantation and ED post-WATCHMAN. Incision site has healed well. He has occasional awareness of the new device in place but not bothersome.         PAST MEDICAL HISTORY     Past Medical History:   Diagnosis Date    Agatston CAC score, <100 06/10/2015    Coronary calcium score 4.    Arthritis     DDD (degenerative disc disease), thoracic     Degenerative arthritis of thumb     Left    Diverticulitis     Diverticulosis of colon 09/29/2016    of the descending colon; moderate    GERD (gastroesophageal reflux disease)     Incomplete RBBB     Numbness     Right hand    Polyp of ascending colon 09/29/2016    polyps (5mm) in the ascenidning colon and transverse colon    Pruritus ani     Sinus headache            PAST SURGICAL HISTORY     Past Surgical History:   Procedure Laterality Date    ENDOSCOPY, COLON, DIAGNOSTIC  2005    HX COLONOSCOPY  12/13    HX OTHER SURGICAL      Removal of colon polyps    HX TOTAL COLECTOMY  2005    colectomy for diverticulitis    [place] ABDOMEN SURGERY PROC UNLISTED  12/12    exploratory, small bowel obstruction, internal hernia    [place] COMPRE ELECTROPHYSIOL XM W/LEFT ATRIAL PACNG/REC N/A 10/15/2020    Lt Atrial Pace & Record During Ep Study performed by Mariella Blank MD at [address] CATH LAB    [place] EPHYS EVAL W/ABLATION SUPRAVENT ARRHYTHMIA N/A 10/15/2020    ABLATION A-FLUTTER performed by Mariella Blank MD at [address] CATH LAB    [place] INS NEW/RPLCMT PRM PM W/TRANSV ELTRD ATRIAL&VENT N/A 4/12/2021    INSERT PPM DUAL/EVI performed by Aneudy Almanza MD at [address] CATH LAB    [place] INTRACARDIAC ELECTROPHYSIOLOGIC 3D MAPPING N/A 10/15/2020    EP 3D MAPPING performed by Aneudy Almanza MD at [facility] CATH LAB          ALLERGIES     Allergies   Allergen Reactions    Ciprofloxacin Other (comments)     \"Joint pain\"          FAMILY HISTORY     Family History   Problem Relation Age of Onset    Stroke Mother     Heart Disease Father     negative for cardiac disease       SOCIAL HISTORY     Social History     Socioeconomic History    Marital status:      Spouse name: Not on file    Number of children: Not on file    Years of education: Not on file    Highest education level: Not on file   Tobacco Use    Smoking status: Never Smoker    Smokeless tobacco: Never Used   Substance and Sexual Activity    Alcohol use: Yes     Alcohol/week: 3.0 standard drinks     Types: 3 Cans of beer per week    Drug use: No     Social Determinants of Health     Financial Resource Strain:     Difficulty of Paying Living Expenses:    Food Insecurity:     Worried About Running Out of Food in the Last Year:     [address] in the Last Year:    Transportation Needs:     Lack of Transportation (Medical):  Lack of Transportation (Non-Medical):    Physical Activity:     Days of Exercise per Week:     Minutes of Exercise per Session:    Stress:     Feeling of Stress :    Social Connections:     Frequency of Communication with Friends and Family:     Frequency of Social Gatherings with Friends and Family:     Attends Sikhism Services:     Active Member of Clubs or Organizations:     Attends Club or Organization Meetings:     Marital Status:          MEDICATIONS     Current Outpatient Medications   Medication Sig    clopidogreL (Plavix) 75 mg tab Take 1 Tab by mouth daily.  atorvastatin (LIPITOR) 10 mg tablet Take 10 mg by mouth daily.     lisinopriL (PRINIVIL, ZESTRIL) 10 mg tablet Take 1 Tab by mouth daily.  aspirin delayed-release 81 mg tablet Take 81 mg by mouth daily.  multivitamin (ONE A DAY) tablet Take 1 Tab by mouth daily.  psyllium (METAMUCIL) packet Take 1 Packet by mouth two (2) times a day. No current facility-administered medications for this visit. I have reviewed the nurses notes, vitals, problem list, allergy list, medical history, family, social history and medications. REVIEW OF SYMPTOMS      General: Pt denies excessive weight gain or loss. Pt is able to conduct ADL's  HEENT: Denies blurred vision, headaches, hearing loss, epistaxis and difficulty swallowing. Respiratory: Denies cough, congestion, shortness of breath, BELTRAN, wheezing or stridor. Cardiovascular: Denies precordial pain, palpitations, edema or PND  Gastrointestinal: Denies poor appetite, indigestion, abdominal pain or blood in stool  Genitourinary: Denies hematuria, dysuria, increased urinary frequency  Musculoskeletal: Denies joint pain or swelling from muscles or joints  Neurologic: Denies tremor, paresthesias, headache, or sensory motor disturbance  Psychiatric: Denies confusion, insomnia, depression  Integumentray: Denies rash, itching or ulcers. Hematologic: Denies easy bruising, bleeding       PHYSICAL EXAMINATION      Vitals: see vitals section  General: Well developed, in no acute distress. HEENT: No jaundice, oral mucosa moist, no oral ulcers  Neck: Supple, no stiffness, no lymphadenopathy, supple  Heart:  Normal S1/S2 negative S3 or S4. Regular, no murmur, gallop or rub, no jugular venous distention  Respiratory: Clear bilaterally x 4, no wheezing or rales  Abdomen:   Soft, non-tender, bowel sounds are active. Extremities:  No edema, normal cap refill, no cyanosis. Musculoskeletal: No clubbing, no deformities  Neuro: A&Ox3, speech clear, gait stable, cooperative, no focal neurologic deficits  Skin: Skin color is normal. No rashes or lesions.  Non diaphoretic, moist.  Vascular: 2+ pulses symmetric in all extremities       DIAGNOSTIC DATA      EKG:   Visit Vitals  /80 (BP 1 Location: Left upper arm, BP Patient Position: Sitting, BP Cuff Size: Adult)   Pulse 60   Resp 18   Ht 6' 2\" (1.88 m)   Wt 219 lb 6.4 oz (99.5 kg)   SpO2 98%   BMI 28.17 kg/m²        LABORATORY DATA      Lab Results   Component Value Date/Time    WBC 6.9 04/06/2021 11:00 AM    HGB 14.1 04/06/2021 11:00 AM    HCT 45.3 04/06/2021 11:00 AM    PLATELET 760 11/16/2302 11:00 AM    MCV 85.8 04/06/2021 11:00 AM      Lab Results   Component Value Date/Time    Sodium 139 04/06/2021 11:00 AM    Potassium 4.5 04/06/2021 11:00 AM    Chloride 106 04/06/2021 11:00 AM    CO2 30 04/06/2021 11:00 AM    Anion gap 3 (L) 04/06/2021 11:00 AM    Glucose 104 (H) 04/06/2021 11:00 AM    BUN 16 04/06/2021 11:00 AM    Creatinine 0.93 04/06/2021 11:00 AM    BUN/Creatinine ratio 17 04/06/2021 11:00 AM    GFR est AA >60 04/06/2021 11:00 AM    GFR est non-AA >60 04/06/2021 11:00 AM    Calcium 9.3 04/06/2021 11:00 AM    Bilirubin, total 0.3 11/25/2015 08:24 AM    Alk. phosphatase 86 11/25/2015 08:24 AM    Protein, total 6.1 (L) 11/25/2015 08:24 AM    Albumin 3.5 11/25/2015 08:24 AM    Globulin 2.6 11/25/2015 08:24 AM    A-G Ratio 1.3 11/25/2015 08:24 AM    ALT (SGPT) 37 11/25/2015 08:24 AM           ASSESSMENT      1. Atrial flutter                        A. S/p ablation  2. Hypertension  3. DG  4. GERD  5. Atrial fibrillation    A. WATCHMAN  6. Diverticulosis/diverticulitis             A. S/p partial colectomy  7. Pacemaker   A. Dual chamber   B. [employer]          PLAN     Discontinue plavix in 2 months; continue aspirin. Continue monitoring in device clinic. FOLLOW-UP     1 year      Thank you, Debbie Nathan MD for allowing me to participate in the care of this extraordinarily pleasant male. Please do not hesitate to contact me for further questions/concerns.          Jose Galicia MD  Cardiac Electrophysiology / Cardiology    [address]  [address], [address] [place] [address] [facility]  [phone] / [phone] Fax   [phone] / [phone] Fax

## 2021-07-16 NOTE — PROGRESS NOTES
Room #: 4      Visit Vitals  /80 (BP 1 Location: Left upper arm, BP Patient Position: Sitting, BP Cuff Size: Adult)   Pulse 60   Resp 18   Ht 6' 2\" (1.88 m)   Wt 219 lb 6.4 oz (99.5 kg)   SpO2 98%   BMI 28.17 kg/m²         Chest pain:  NO  Shortness of breath:  NO  Edema: NO  Palpitations, skipped beats, rapid heartbeat:  NO  Dizziness:  NO    1. Have you been to the ER, urgent care clinic since your last visit? Hospitalized since your last visit? No    2. Have you seen or consulted any other health care providers outside of the [address] [place] [address] since your last visit? Include any pap smears or colon screening.  No      Refills:  NO

## 2021-07-19 ENCOUNTER — TELEPHONE (OUTPATIENT)
Dept: CARDIOLOGY CLINIC | Age: 71
End: 2021-07-19

## 2021-07-19 NOTE — TELEPHONE ENCOUNTER
Patient having a issue getting his insurance company to pay for the procedure that he had on 03/02/21, the insurance states that it was medically necessary, please advise      [phone]

## 2021-07-20 NOTE — TELEPHONE ENCOUNTER
Returned patient call, ID verified using two patient identifiers. Patient calling because he say [employer] is denying coverage for a portion of his watchman procedure that was performed on 3/2/21. Patient states he talked with the billing department who then directed him back to the office. Advised patient that unfortunately I can not help him with billing issues. Advised that we received authorization from the financial clearing department prior to his procedure. Patient states he will send through [employer] the documents he received to see if we can make sense of them. Notified him that I will provide him with the billing office number via [employer] to see if he can figure out how he needs to proceed. Patient verbalized understanding and will call with any other questions.

## 2021-10-12 ENCOUNTER — OFFICE VISIT (OUTPATIENT)
Dept: CARDIOLOGY CLINIC | Age: 71
End: 2021-10-12
Payer: COMMERCIAL

## 2021-10-12 DIAGNOSIS — Z95.0 CARDIAC PACEMAKER IN SITU: Primary | ICD-10-CM

## 2021-10-12 PROCEDURE — 93296 REM INTERROG EVL PM/IDS: CPT | Performed by: NURSE PRACTITIONER

## 2021-10-12 PROCEDURE — 93294 REM INTERROG EVL PM/LDLS PM: CPT | Performed by: NURSE PRACTITIONER

## 2021-10-12 NOTE — LETTER
10/13/2021 1:53 PM    Mr. Anna Hernandez Nabil Klein        Dear Mr. nAna Vega.,    We have received your recent remote monitor check of your implanted device on 10/12/2021. Your remaining estimated battery life is 15 years and your device is working normally & appropriately. You continue to have episodes of atrial fibrillation so please continue all medications as prescribed. The overall burden of afib since 7/16/2021 is 1% but the longest episode was over 6 hours. Your next remote monitor check is scheduled for 1/20/2022. You do not need to report to the office as this occurs during the night from your home. Please make sure to keep your home monitor plugged into power and within 10 feet of where you sleep. Your next clinic/office check is scheduled for Monday, 7/25/2022 at 8:40 am.  You will have your device checked then see the provider. Please bring a complete list of your medications with strengths and dosages to this appointment. Also, be prepared to discuss any symptoms you may be having since your last visit. Please plan to arrive 10 minutes early to allow time for check-in. [employer] parking is CLOSED once again, due to [address]. The parking lot entrance that is next to [address] is also CLOSED. If you have difficulty walking from the parking lot, please arrange to have someone drop you off to allow time to check into the office. You are allowed to have one visitor accompany you to your appointment and no children under the age of 13 are allowed. Masks are mandatory while in the building. If you have any questions, please call the Pacemaker/ICD clinic at the [facility] location at [phone]. We appreciate you staying remotely connected! Sincerely,    Richie SANDERS, RN  Cardiac Device Clinic Coordinator  [facility]  [address], [address] [facility]  [phone]

## 2021-11-04 ENCOUNTER — TELEPHONE (OUTPATIENT)
Dept: CARDIOLOGY CLINIC | Age: 71
End: 2021-11-04

## 2021-11-04 NOTE — TELEPHONE ENCOUNTER
Patient stated her received a letter from the device clinic and has questions, please advise        [phone]

## 2021-11-04 NOTE — TELEPHONE ENCOUNTER
Pt identified using two identifiers. Returned his call to discuss remote transmission letter. He didn't think he was having any afib per his Samsung watch based on his night time multiple checks. Informed him device records afib episodes over course of day so count is more accurate. Discussed remote transmissions and f/u letters with results. If he has any other concerns encouraged use of [employer] for communication. Expressed appreciation of call & wished us Happy Thanksgiving!

## 2021-12-08 ENCOUNTER — TELEPHONE (OUTPATIENT)
Dept: CARDIOLOGY CLINIC | Age: 71
End: 2021-12-08

## 2021-12-08 NOTE — TELEPHONE ENCOUNTER
Patient requesting a call back from the nurse practitioner, patient did not disclose any information, please advise        [phone]

## 2021-12-08 NOTE — TELEPHONE ENCOUNTER
Returned patient call, ID verified using two patient identifiers. Patient calling because he states he received notification that Dr. Princess Jones was leaving. He said when he last saw Dr. 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 He would like to see if he can get that letter written before Dr. Arelis Cortes. Advised patient that I will request the letter from Dr. Princess Jones and once written it will be available in [employer]. Patient also states he has received bills for his last two procedures and that his insurance denied the authorization after the procedures were done [facility].)  He states he called the billing department and has filed appeals with [employer] which they have denied. He is asking for assistance in this matter. Requested patient send through [employer] the information he has received from christian and I will see what I can do.

## 2021-12-08 NOTE — LETTER
12/9/2021 1:36 PM    Mr. Kelly Torresdelmar.   [address]              Sincerely,      Yoseph Hutchinson NP

## 2022-01-20 ENCOUNTER — OFFICE VISIT (OUTPATIENT)
Dept: CARDIOLOGY CLINIC | Age: 72
End: 2022-01-20
Payer: COMMERCIAL

## 2022-01-20 DIAGNOSIS — Z95.0 CARDIAC PACEMAKER IN SITU: Primary | ICD-10-CM

## 2022-01-20 PROCEDURE — 93296 REM INTERROG EVL PM/IDS: CPT | Performed by: INTERNAL MEDICINE

## 2022-01-20 PROCEDURE — 93294 REM INTERROG EVL PM/LDLS PM: CPT | Performed by: INTERNAL MEDICINE

## 2022-01-20 NOTE — LETTER
1/21/2022 9:20 AM    Mr. Hermelindo Villanueva. [place] [address]      Dear Mr. Hermelindo Villanueva.,    We have received your recent remote monitor check of your implanted device on 1/20/2022. Your remaining estimated battery life is 15 years and your device is working normally & appropriately. Your next remote monitor check is scheduled for 4/26/2022. This is NOT an in-clinic appointment. This transmission is sent from your home monitor. Please make sure your home monitor is plugged into power and within 10 feet of where you sleep. If you have difficulty sending a transmission, please do NOT call our office. Instead, call tech support for your device as they are better able to assist.    [facility])  [phone]    Your next clinic/office check is scheduled for Monday, 7/25/2022 at 8:40 am.  You will have your device checked then see the provider. Please bring a complete list of your medications with strengths and dosages to this appointment. Also, be prepared to discuss any symptoms you may be having since your last visit. Please plan to arrive 10 minutes early to allow time for check-in. [employer] parking is CLOSED once again, due to [address]. The parking lot entrance that is next to [address] is also CLOSED. If you have difficulty walking from the parking lot, please arrange to have someone drop you off to allow time to check into the office. You are allowed to have one visitor accompany you to your appointment and no children under the age of 13 are allowed. Masks are mandatory while in the building. If you have any questions, please call the Pacemaker/ICD clinic at the [address] [place] [address] location at [phone]. We appreciate you staying remotely connected! Sincerely,    Adelaida SANDERS, RN  Cardiac Device Clinic Coordinator  [facility] [address] [place] [address] 82.  [address], [address] [place] [address]  [phone]

## 2022-02-02 ENCOUNTER — PATIENT MESSAGE (OUTPATIENT)
Dept: CARDIOLOGY CLINIC | Age: 72
End: 2022-02-02

## 2022-02-10 NOTE — TELEPHONE ENCOUNTER
Called patient, ID verified using two patient identifiers. Notified patient that I have the letter of necessity for his Watchman procedure and progress notes printed and ready for him to  at the office. Patient states he will head to the office now to pick them up.

## 2022-03-19 PROBLEM — Z95.818 PRESENCE OF WATCHMAN LEFT ATRIAL APPENDAGE CLOSURE DEVICE: Status: ACTIVE | Noted: 2021-03-02

## 2022-04-26 ENCOUNTER — OFFICE VISIT (OUTPATIENT)
Dept: CARDIOLOGY CLINIC | Age: 72
End: 2022-04-26
Payer: COMMERCIAL

## 2022-04-26 DIAGNOSIS — Z95.0 CARDIAC PACEMAKER IN SITU: Primary | ICD-10-CM

## 2022-04-26 PROCEDURE — 93294 REM INTERROG EVL PM/LDLS PM: CPT | Performed by: INTERNAL MEDICINE

## 2022-04-26 PROCEDURE — 93296 REM INTERROG EVL PM/IDS: CPT | Performed by: INTERNAL MEDICINE

## 2022-04-26 NOTE — PROGRESS NOTES
chrgeable dc pm remote    Normal device function. 68% RA & 3% RV pacing. See scanned report in  for details.

## 2022-04-26 NOTE — LETTER
4/26/2022 3:05 PM    Mr. Anthony Hedrick. [place]       Dear Mr. Anthony Hedrick.,    We have received your recent remote monitor check of your implanted device on 4/26/22. Your remaining estimated battery life is 15 years and your device is working normally & appropriately. Your next remote monitor check is scheduled for 11/1/22. This is NOT an in-clinic appointment. This transmission is sent from your home monitor. Please make sure your home monitor is plugged into power and within 10 feet of where you sleep. If you are using the phone applications, please make sure it is open on your smart phone. If you have difficulty sending a transmission, please do NOT call our office. Instead, call tech support for your device as they are better able to assist.    [employer])  [phone]    Your next clinic/office check is scheduled for Monday, 7/25/2022 at 8:40 am.  You will have your device checked then see the provider. Please bring a complete list of your medications with strengths and dosages to this appointment. If you have any questions, please call the Pacemaker/ICD clinic at the [address] [place] [address] location at [phone]. We appreciate you staying remotely connected!     Sincerely,    Marin Butcher RN, BSN  Device Coordinator  [phone]

## 2022-05-24 ENCOUNTER — TELEPHONE (OUTPATIENT)
Dept: CARDIOLOGY CLINIC | Age: 72
End: 2022-05-24

## 2022-05-24 NOTE — TELEPHONE ENCOUNTER
Patient called to speak with the NP, stated he is not having any complications but did not give a reason for the call, please advise        [phone]

## 2022-07-25 ENCOUNTER — OFFICE VISIT (OUTPATIENT)
Dept: CARDIOLOGY CLINIC | Age: 72
End: 2022-07-25
Payer: COMMERCIAL

## 2022-07-25 DIAGNOSIS — Z95.0 CARDIAC PACEMAKER IN SITU: Primary | ICD-10-CM

## 2022-07-25 PROCEDURE — 93280 PM DEVICE PROGR EVAL DUAL: CPT | Performed by: INTERNAL MEDICINE

## 2022-07-25 NOTE — PROGRESS NOTES
C/ annual pacer ck/thresholds  Device functioning appropriately as programmed.    See scanned documents

## 2022-08-10 ENCOUNTER — TELEPHONE (OUTPATIENT)
Dept: CARDIOLOGY CLINIC | Age: 72
End: 2022-08-10

## 2022-08-10 NOTE — TELEPHONE ENCOUNTER
Patient is calling because he would like to speak with the nurse because he has some questions. Patient  needs to discuss his [address]      [phone]

## 2022-08-11 NOTE — TELEPHONE ENCOUNTER
Returned patient call, ID verified using two patient identifiers. Patient calling because he is trying to renew is [address] [place] license. He is requesting his lastest device check and procedure note be emailed to [email or web address]. Device check from 7/25/22 and procedure note from 4/12/21 emailed as requested.

## 2022-09-04 NOTE — TELEPHONE ENCOUNTER
"Subjective:       Patient ID: Jesse Hernández is a 47 y.o. male.    Vitals:  height is 5' 9" (1.753 m) and weight is 106.6 kg (235 lb). His temperature is 98 °F (36.7 °C). His blood pressure is 121/79 and his pulse is 65. His respiration is 18 and oxygen saturation is 97%.     Chief Complaint: Injury (Stepped on nail two days ago. Seems infected - Entered by patient)    Patient presents with L foot pain after stepping on a nail 2 days ago while fishing. Nail punctured through his shoe. Associated sxs include wound discharge. Patient denies any fever, joint pain, joint swelling, or n/v. Prior Tx includes NSAIDs.     Injury  This is a new problem. The current episode started in the past 7 days. The problem has been gradually worsening. Pertinent negatives include no arthralgias, chills, fever, joint swelling, nausea or vomiting. He has tried NSAIDs for the symptoms. The treatment provided no relief.   Constitution: Negative for chills and fever.   Gastrointestinal:  Negative for nausea and vomiting.   Musculoskeletal:  Negative for joint pain and joint swelling.   Skin:  Positive for puncture wound (L foot).     Objective:      Physical Exam   Constitutional: He is oriented to person, place, and time.  Non-toxic appearance. He does not appear ill. No distress.   Abdominal: Normal appearance.   Musculoskeletal:         General: No swelling, tenderness or deformity.      Left foot: Normal range of motion. No bony tenderness or deformity.      Comments: - L foot tenderness, swelling, or deformity     Neurological: He is alert and oriented to person, place, and time. He has normal motor skills and normal sensation.      Comments: No L foot sensory deficit or motor dysfunction   Skin: Skin is warm and dry.         Comments: Puncture wound to plantar aspect of L foot, mild surrounding erythema   Nursing note and vitals reviewed.      Assessment:       1. Puncture wound of left foot, initial encounter    2. Need for Tdap " Returned patient call, ID verified using two patient identifiers. Patient calling because he is still dealing with his insurance company and states they are denying his watchman device that was done by Dr. Melissa Ramirez last year. Patient is requesting any information that we received showing that his procedure was approved prior to. Asks that it be emailed to [email or web address]. Advised patient that I will send him whatever I can from our [facility] authorization department. Patient verbalized understanding and will call with any other questions. vaccination        Plan:         Puncture wound of left foot, initial encounter  -     ciprofloxacin HCl (CIPRO) 500 MG tablet; Take 1 tablet (500 mg total) by mouth every 12 (twelve) hours. for 7 days  Dispense: 14 tablet; Refill: 0  -     (In Office Administered) Tdap Vaccine    Need for Tdap vaccination  -     (In Office Administered) Tdap Vaccine    Recommend OTC NSAIDs for pain relief    Patient Instructions   Take full course of antibiotics until completion.    When do I need to call the doctor?   Signs of infection. These include a fever of 100.4°F (38°C) or higher, chills, or wound that will not heal.  The pain in and around the area gets much worse.  There is a bad smell or pus (thick yellow, green, or gray fluid) coming from your wound.  You notice a crunchy feeling or blisters in the skin around the wound.  The redness around your wound gets bigger or is spreading up your arm or leg.  Fluid that is not pus drains from your wound.  Your swelling doesnt improve or gets worse.

## 2022-11-01 ENCOUNTER — OFFICE VISIT (OUTPATIENT)
Dept: CARDIOLOGY CLINIC | Age: 72
End: 2022-11-01
Payer: COMMERCIAL

## 2022-11-01 DIAGNOSIS — Z95.0 CARDIAC PACEMAKER IN SITU: Primary | ICD-10-CM

## 2023-02-02 ENCOUNTER — OFFICE VISIT (OUTPATIENT)
Dept: CARDIOLOGY CLINIC | Age: 73
End: 2023-02-02
Payer: COMMERCIAL

## 2023-02-02 DIAGNOSIS — Z95.0 CARDIAC PACEMAKER IN SITU: Primary | ICD-10-CM

## 2023-05-04 ENCOUNTER — OFFICE VISIT (OUTPATIENT)
Dept: CARDIOLOGY CLINIC | Age: 73
End: 2023-05-04

## 2023-05-04 DIAGNOSIS — Z95.0 CARDIAC PACEMAKER IN SITU: Primary | ICD-10-CM

## 2023-06-04 ENCOUNTER — HOSPITAL ENCOUNTER (EMERGENCY)
Facility: HOSPITAL | Age: 73
Discharge: HOME | End: 2023-06-04
Attending: EMERGENCY MEDICINE
Payer: COMMERCIAL

## 2023-06-04 ENCOUNTER — APPOINTMENT (OUTPATIENT)
Facility: HOSPITAL | Age: 73
End: 2023-06-04
Payer: COMMERCIAL

## 2023-06-04 VITALS
BODY MASS INDEX: 29.52 KG/M2 | DIASTOLIC BLOOD PRESSURE: 64 MMHG | WEIGHT: 230 LBS | RESPIRATION RATE: 14 BRPM | HEIGHT: 74 IN | TEMPERATURE: 97.5 F | SYSTOLIC BLOOD PRESSURE: 161 MMHG | HEART RATE: 65 BPM | OXYGEN SATURATION: 96 %

## 2023-06-04 DIAGNOSIS — K80.20 GALLSTONES: Primary | ICD-10-CM

## 2023-06-04 DIAGNOSIS — R10.11 RIGHT UPPER QUADRANT ABDOMINAL PAIN: ICD-10-CM

## 2023-06-04 LAB
ALBUMIN SERPL-MCNC: 3.7 G/DL (ref 3.5–5)
ALBUMIN/GLOB SERPL: 1.2 (ref 1.1–2.2)
ALP SERPL-CCNC: 75 U/L (ref 45–117)
ALT SERPL-CCNC: 38 U/L (ref 12–78)
AMYLASE SERPL-CCNC: 87 U/L (ref 25–115)
ANION GAP SERPL CALC-SCNC: 3 MMOL/L (ref 5–15)
APPEARANCE UR: CLEAR
AST SERPL-CCNC: 24 U/L (ref 15–37)
BACTERIA URNS QL MICRO: NEGATIVE /HPF
BASOPHILS # BLD: 0.1 K/UL (ref 0–0.1)
BASOPHILS NFR BLD: 0 % (ref 0–1)
BILIRUB SERPL-MCNC: 0.4 MG/DL (ref 0.2–1)
BILIRUB UR QL: NEGATIVE
BUN SERPL-MCNC: 30 MG/DL (ref 6–20)
BUN/CREAT SERPL: 23 (ref 12–20)
CALCIUM SERPL-MCNC: 8.8 MG/DL (ref 8.5–10.1)
CHLORIDE SERPL-SCNC: 111 MMOL/L (ref 97–108)
CO2 SERPL-SCNC: 28 MMOL/L (ref 21–32)
COLOR UR: ABNORMAL
COMMENT:: NORMAL
CREAT SERPL-MCNC: 1.28 MG/DL (ref 0.7–1.3)
CRP SERPL-MCNC: <0.29 MG/DL (ref 0–0.6)
DIFFERENTIAL METHOD BLD: ABNORMAL
EOSINOPHIL # BLD: 0 K/UL (ref 0–0.4)
EOSINOPHIL NFR BLD: 0 % (ref 0–7)
EPITH CASTS URNS QL MICRO: ABNORMAL /LPF
ERYTHROCYTE [DISTWIDTH] IN BLOOD BY AUTOMATED COUNT: 14.6 % (ref 11.5–14.5)
GLOBULIN SER CALC-MCNC: 3.2 G/DL (ref 2–4)
GLUCOSE SERPL-MCNC: 186 MG/DL (ref 65–100)
GLUCOSE UR STRIP.AUTO-MCNC: NEGATIVE MG/DL
HCT VFR BLD AUTO: 43.1 % (ref 36.6–50.3)
HGB BLD-MCNC: 14.3 G/DL (ref 12.1–17)
HGB UR QL STRIP: NEGATIVE
HYALINE CASTS URNS QL MICRO: ABNORMAL /LPF (ref 0–2)
IMM GRANULOCYTES # BLD AUTO: 0.1 K/UL (ref 0–0.04)
IMM GRANULOCYTES NFR BLD AUTO: 0 % (ref 0–0.5)
KETONES UR QL STRIP.AUTO: ABNORMAL MG/DL
LACTATE SERPL-SCNC: 1.2 MMOL/L (ref 0.4–2)
LEUKOCYTE ESTERASE UR QL STRIP.AUTO: NEGATIVE
LIPASE SERPL-CCNC: 128 U/L (ref 73–393)
LYMPHOCYTES # BLD: 1.1 K/UL (ref 0.8–3.5)
LYMPHOCYTES NFR BLD: 8 % (ref 12–49)
MCH RBC QN AUTO: 27.3 PG (ref 26–34)
MCHC RBC AUTO-ENTMCNC: 33.2 G/DL (ref 30–36.5)
MCV RBC AUTO: 82.3 FL (ref 80–99)
MONOCYTES # BLD: 0.9 K/UL (ref 0–1)
MONOCYTES NFR BLD: 7 % (ref 5–13)
NEUTS SEG # BLD: 11.5 K/UL (ref 1.8–8)
NEUTS SEG NFR BLD: 85 % (ref 32–75)
NITRITE UR QL STRIP.AUTO: NEGATIVE
NRBC # BLD: 0 K/UL (ref 0–0.01)
NRBC BLD-RTO: 0 PER 100 WBC
PH UR STRIP: 5 (ref 5–8)
PLATELET # BLD AUTO: 216 K/UL (ref 150–400)
PMV BLD AUTO: 10.8 FL (ref 8.9–12.9)
POTASSIUM SERPL-SCNC: 4.1 MMOL/L (ref 3.5–5.1)
PROT SERPL-MCNC: 6.9 G/DL (ref 6.4–8.2)
PROT UR STRIP-MCNC: NEGATIVE MG/DL
RBC # BLD AUTO: 5.24 M/UL (ref 4.1–5.7)
RBC #/AREA URNS HPF: ABNORMAL /HPF (ref 0–5)
SODIUM SERPL-SCNC: 142 MMOL/L (ref 136–145)
SP GR UR REFRACTOMETRY: 1.02 (ref 1–1.03)
SPECIMEN HOLD: NORMAL
URINE CULTURE IF INDICATED: ABNORMAL
UROBILINOGEN UR QL STRIP.AUTO: 1 EU/DL (ref 0.2–1)
WBC # BLD AUTO: 13.6 K/UL (ref 4.1–11.1)
WBC URNS QL MICRO: ABNORMAL /HPF (ref 0–4)

## 2023-06-04 PROCEDURE — 76705 ECHO EXAM OF ABDOMEN: CPT

## 2023-06-04 PROCEDURE — 83605 ASSAY OF LACTIC ACID: CPT

## 2023-06-04 PROCEDURE — 74177 CT ABD & PELVIS W/CONTRAST: CPT

## 2023-06-04 PROCEDURE — 36415 COLL VENOUS BLD VENIPUNCTURE: CPT

## 2023-06-04 PROCEDURE — 82150 ASSAY OF AMYLASE: CPT

## 2023-06-04 PROCEDURE — 6360000004 HC RX CONTRAST MEDICATION: Performed by: RADIOLOGY

## 2023-06-04 PROCEDURE — 83690 ASSAY OF LIPASE: CPT

## 2023-06-04 PROCEDURE — 96375 TX/PRO/DX INJ NEW DRUG ADDON: CPT

## 2023-06-04 PROCEDURE — 80053 COMPREHEN METABOLIC PANEL: CPT

## 2023-06-04 PROCEDURE — 96374 THER/PROPH/DIAG INJ IV PUSH: CPT

## 2023-06-04 PROCEDURE — 99285 EMERGENCY DEPT VISIT HI MDM: CPT

## 2023-06-04 PROCEDURE — 93005 ELECTROCARDIOGRAM TRACING: CPT | Performed by: EMERGENCY MEDICINE

## 2023-06-04 PROCEDURE — 6360000002 HC RX W HCPCS: Performed by: EMERGENCY MEDICINE

## 2023-06-04 PROCEDURE — 85025 COMPLETE CBC W/AUTO DIFF WBC: CPT

## 2023-06-04 PROCEDURE — 81001 URINALYSIS AUTO W/SCOPE: CPT

## 2023-06-04 PROCEDURE — 2580000003 HC RX 258: Performed by: EMERGENCY MEDICINE

## 2023-06-04 PROCEDURE — 86140 C-REACTIVE PROTEIN: CPT

## 2023-06-04 RX ORDER — HYDROCODONE BITARTRATE AND ACETAMINOPHEN 5; 325 MG/1; MG/1
1 TABLET ORAL EVERY 8 HOURS PRN
Qty: 10 TABLET | Refills: 0 | Status: SHIPPED | OUTPATIENT
Start: 2023-06-04 | End: 2023-06-07

## 2023-06-04 RX ORDER — 0.9 % SODIUM CHLORIDE 0.9 %
1000 INTRAVENOUS SOLUTION INTRAVENOUS ONCE
Status: COMPLETED | OUTPATIENT
Start: 2023-06-04 | End: 2023-06-04

## 2023-06-04 RX ORDER — ONDANSETRON 4 MG/1
4 TABLET, ORALLY DISINTEGRATING ORAL 3 TIMES DAILY PRN
Qty: 21 TABLET | Refills: 0 | Status: SHIPPED | OUTPATIENT
Start: 2023-06-04

## 2023-06-04 RX ORDER — KETOROLAC TROMETHAMINE 30 MG/ML
30 INJECTION, SOLUTION INTRAMUSCULAR; INTRAVENOUS
Status: COMPLETED | OUTPATIENT
Start: 2023-06-04 | End: 2023-06-04

## 2023-06-04 RX ORDER — ONDANSETRON 2 MG/ML
4 INJECTION INTRAMUSCULAR; INTRAVENOUS ONCE
Status: COMPLETED | OUTPATIENT
Start: 2023-06-04 | End: 2023-06-04

## 2023-06-04 RX ADMIN — SODIUM CHLORIDE 1000 ML: 9 INJECTION, SOLUTION INTRAVENOUS at 00:46

## 2023-06-04 RX ADMIN — ONDANSETRON 4 MG: 2 INJECTION INTRAMUSCULAR; INTRAVENOUS at 00:45

## 2023-06-04 RX ADMIN — IOPAMIDOL 100 ML: 755 INJECTION, SOLUTION INTRAVENOUS at 02:42

## 2023-06-04 RX ADMIN — KETOROLAC TROMETHAMINE 30 MG: 30 INJECTION, SOLUTION INTRAMUSCULAR; INTRAVENOUS at 00:46

## 2023-06-04 ASSESSMENT — PAIN SCALES - GENERAL
PAINLEVEL_OUTOF10: 1
PAINLEVEL_OUTOF10: 10

## 2023-06-04 ASSESSMENT — LIFESTYLE VARIABLES
HOW MANY STANDARD DRINKS CONTAINING ALCOHOL DO YOU HAVE ON A TYPICAL DAY: PATIENT DOES NOT DRINK
HOW OFTEN DO YOU HAVE A DRINK CONTAINING ALCOHOL: NEVER

## 2023-06-04 ASSESSMENT — PAIN - FUNCTIONAL ASSESSMENT: PAIN_FUNCTIONAL_ASSESSMENT: 0-10

## 2023-06-05 LAB
EKG ATRIAL RATE: 66 BPM
EKG DIAGNOSIS: NORMAL
EKG P AXIS: 50 DEGREES
EKG P-R INTERVAL: 198 MS
EKG Q-T INTERVAL: 392 MS
EKG QRS DURATION: 110 MS
EKG QTC CALCULATION (BAZETT): 410 MS
EKG R AXIS: -39 DEGREES
EKG T AXIS: 65 DEGREES
EKG VENTRICULAR RATE: 66 BPM

## 2023-06-05 PROCEDURE — 93010 ELECTROCARDIOGRAM REPORT: CPT | Performed by: SPECIALIST

## 2023-07-25 PROBLEM — Z95.0 PACEMAKER: Status: ACTIVE | Noted: 2023-07-25

## 2023-07-25 PROBLEM — I48.0 PAF (PAROXYSMAL ATRIAL FIBRILLATION) (HCC): Status: ACTIVE | Noted: 2023-07-25

## 2023-07-26 ENCOUNTER — PROCEDURE VISIT (OUTPATIENT)
Age: 73
End: 2023-07-26

## 2023-07-26 ENCOUNTER — OFFICE VISIT (OUTPATIENT)
Age: 73
End: 2023-07-26

## 2023-07-26 ENCOUNTER — TELEPHONE (OUTPATIENT)
Age: 73
End: 2023-07-26

## 2023-07-26 VITALS
SYSTOLIC BLOOD PRESSURE: 146 MMHG | BODY MASS INDEX: 30.03 KG/M2 | HEIGHT: 74 IN | OXYGEN SATURATION: 98 % | WEIGHT: 234 LBS | RESPIRATION RATE: 16 BRPM | DIASTOLIC BLOOD PRESSURE: 88 MMHG

## 2023-07-26 DIAGNOSIS — I48.0 PAF (PAROXYSMAL ATRIAL FIBRILLATION) (HCC): Primary | ICD-10-CM

## 2023-07-26 DIAGNOSIS — Z95.818 PRESENCE OF WATCHMAN LEFT ATRIAL APPENDAGE CLOSURE DEVICE: ICD-10-CM

## 2023-07-26 DIAGNOSIS — Z95.0 PRESENCE OF CARDIAC PACEMAKER: Primary | ICD-10-CM

## 2023-07-26 DIAGNOSIS — Z95.0 PACEMAKER: ICD-10-CM

## 2023-07-26 DIAGNOSIS — G47.33 OBSTRUCTIVE SLEEP APNEA: ICD-10-CM

## 2023-07-26 NOTE — TELEPHONE ENCOUNTER
Spoke with Pt of Afib Ablation w/Dr. Radha Meyer at [address]. For 10/11/23 At 9:30am arrive at 7:30am Pt aware that they need a  NPO from [address] the night before. Check in at the second floor Outpt. Reg. Desk.  Pt is to have Labs done between 9/11/23 and before 10/4/23   Medications:  Start Xarelto 3 wks prior to Albation   Stop ASA when starting Xarelto   VO by /nurse Ciro Wheeler.

## 2023-07-26 NOTE — PATIENT INSTRUCTIONS
1-2P)  [phone]            Medication Instructions:    Please start Xarelto 3 weeks prior to your ablation. Stop Aspirin when you start Xarelto. Nothing to eat or drink after midnight before your procedure. You may take all other medications as directed the morning of your procedure with a sip of water unless otherwise indicated. Please contact the office [phone] and ask for a member of Dr. Radha Dickey team for procedure questions. There is a physician on call for the office after hours for immediate needs. FOLLOW UP:   Your appointments will be made for you post procedure based off of discharge instructions. You may have driving restrictions for a short time after your procedure (usually 2-3 days). Pacemaker/ICD patients will be unable to have an MRI until 6 weeks after implant, NO dental work for 8 weeks after your implant. Atrial Fibrillation Ablation   Discharge Instructions      You have just had an Atrial Fibrillation Ablation. There were catheters temporarily placed in your heart through a puncture in the veins and/or arteries in your groin. WHAT TO EXPECT     If you have had an Atrial Fibrillation Ablation please be aware that you may experience mild chest pain that will resolve within 24-48 hours. If the chest pain persists or becomes severe, please call the office. Mild to moderate, non-painful, bruising or mild swelling at the puncture site is not un-common, and will resolve in 7 - 14 days, and may extend down your thigh as it heals. Application of Ice to the site may help with any tenderness. You have a small gauze dressing applied to the puncture site in your groin. You may remove this the following morning. It is not uncommon to feel palpitations during the healing phase after your ablation. If you feel as though you are having recurrence of atrial fibrillation lasting longer than 30 minutes, please contact the office.     Palpitations/AFIB

## 2023-07-27 ENCOUNTER — CLINICAL DOCUMENTATION (OUTPATIENT)
Age: 73
End: 2023-07-27

## 2023-07-27 NOTE — PROGRESS NOTES
7/26/23- Received PA forms from [employer] for patient's Xarelto 20 mg once a day with supper. Forms completed and signed by Shavon Diego and then faxed to [employer] @ [phone].     7/27/23-Received approval for Xarelto effective 06/26/23 through 07/25/2024.  Case ID: [id number], Pt ID: [id number]

## 2023-09-19 DIAGNOSIS — I48.0 PAF (PAROXYSMAL ATRIAL FIBRILLATION) (HCC): ICD-10-CM

## 2023-09-19 DIAGNOSIS — Z95.818 PRESENCE OF WATCHMAN LEFT ATRIAL APPENDAGE CLOSURE DEVICE: ICD-10-CM

## 2023-09-19 DIAGNOSIS — G47.33 OBSTRUCTIVE SLEEP APNEA: ICD-10-CM

## 2023-09-19 DIAGNOSIS — Z95.0 PACEMAKER: ICD-10-CM

## 2023-09-20 LAB
ANION GAP SERPL CALC-SCNC: 4 MMOL/L (ref 5–15)
BUN SERPL-MCNC: 16 MG/DL (ref 6–20)
BUN/CREAT SERPL: 15 (ref 12–20)
CALCIUM SERPL-MCNC: 9.3 MG/DL (ref 8.5–10.1)
CHLORIDE SERPL-SCNC: 109 MMOL/L (ref 97–108)
CO2 SERPL-SCNC: 27 MMOL/L (ref 21–32)
CREAT SERPL-MCNC: 1.05 MG/DL (ref 0.7–1.3)
ERYTHROCYTE [DISTWIDTH] IN BLOOD BY AUTOMATED COUNT: 14.5 % (ref 11.5–14.5)
GLUCOSE SERPL-MCNC: 86 MG/DL (ref 65–100)
HCT VFR BLD AUTO: 43.1 % (ref 36.6–50.3)
HGB BLD-MCNC: 14 G/DL (ref 12.1–17)
MCH RBC QN AUTO: 26.9 PG (ref 26–34)
MCHC RBC AUTO-ENTMCNC: 32.5 G/DL (ref 30–36.5)
MCV RBC AUTO: 82.9 FL (ref 80–99)
NRBC # BLD: 0 K/UL (ref 0–0.01)
NRBC BLD-RTO: 0 PER 100 WBC
PLATELET # BLD AUTO: 216 K/UL (ref 150–400)
PMV BLD AUTO: 11.3 FL (ref 8.9–12.9)
POTASSIUM SERPL-SCNC: 4.3 MMOL/L (ref 3.5–5.1)
RBC # BLD AUTO: 5.2 M/UL (ref 4.1–5.7)
SODIUM SERPL-SCNC: 140 MMOL/L (ref 136–145)
WBC # BLD AUTO: 7 K/UL (ref 4.1–11.1)

## 2023-09-29 ENCOUNTER — PREP FOR PROCEDURE (OUTPATIENT)
Age: 73
End: 2023-09-29

## 2023-10-03 RX ORDER — SODIUM CHLORIDE 0.9 % (FLUSH) 0.9 %
5-40 SYRINGE (ML) INJECTION EVERY 12 HOURS SCHEDULED
Status: CANCELLED | OUTPATIENT
Start: 2023-10-03

## 2023-10-03 RX ORDER — SODIUM CHLORIDE 0.9 % (FLUSH) 0.9 %
5-40 SYRINGE (ML) INJECTION PRN
Status: CANCELLED | OUTPATIENT
Start: 2023-10-03

## 2023-10-03 RX ORDER — SODIUM CHLORIDE 9 MG/ML
INJECTION, SOLUTION INTRAVENOUS PRN
Status: CANCELLED | OUTPATIENT
Start: 2023-10-03

## 2023-10-09 NOTE — H&P
Office note from 23 reviewed. No interim change.   ---------------  Cardiac Electrophysiology Office Follow-up     NAME:            Paige Serrano. :               1950  MRM:              [id number]     Date:               2023            Assessment and Plan:      1. PAF (paroxysmal atrial fibrillation) ([facility])  -     Transthoracic echocardiogram (TTE) complete with contrast, bubble, strain, and 3D PRN; Future  -     CBC; Future  -     Basic Metabolic Panel; Future  2. Pacemaker  -     CBC; Future  -     Basic Metabolic Panel; Future  3. Presence of Watchman left atrial appendage closure device  -     CBC; Future  -     Basic Metabolic Panel; Future  4. Obstructive sleep apnea  -     CBC; Future  -     Basic Metabolic Panel; Future        Paroxysmal atrial fibrillation  - Echo 3/2/21: EF 60-65%, mildly dilated left atrium   -Previously no significant atrial arrhythmias but in the past year has developed atrial fibrillation with increasing burden down to 10%  - S/p Watchman implant 3/2/21 [facility])   - Update echo to reassess EF and LA size   - We discussed atrial fibrillation ablation. Literature from the EAST-AFNET 4 Trial demonstrated that early rhythm control management in patients with early diagnosis of atrial fibrillation (median time of diagnosis, 36 days) resulted in reduction in cardiovascular mortality, stroke, or hospitalization with worsening heart failure or ACS. - Recommended option of antiarrhythmic therapy versus ablation therapy. Patient ultimately wants to proceed with ablation  -- The implication regarding the diagnosis of AF was explained to the patient in great detail including the associated risk of CVA, AF-mediated cardiomyopathy, and progression into persistent/chronic AF.  - I have discussed options including continued medical therapy and ablation in detail.  I have discussed the risks of left atrial ablation including vascular complications, infection, MI, death,

## 2023-10-10 ENCOUNTER — TELEPHONE (OUTPATIENT)
Age: 73
End: 2023-10-10

## 2023-10-10 NOTE — TELEPHONE ENCOUNTER
Spoke To Pt:  Just a reminder not to forget your Afib Ablation scheduled for tomorrow. Arriving at 7:30am  You will need a    NPO from midnight   check in at the First floor outpt. reg. Desk.   Medications:  Ok to take  VO by [facility].

## 2023-10-11 ENCOUNTER — ANESTHESIA (OUTPATIENT)
Facility: HOSPITAL | Age: 73
End: 2023-10-11
Payer: COMMERCIAL

## 2023-10-11 ENCOUNTER — HOSPITAL ENCOUNTER (OUTPATIENT)
Facility: HOSPITAL | Age: 73
Discharge: HOME | End: 2023-10-11
Attending: INTERNAL MEDICINE | Admitting: INTERNAL MEDICINE
Payer: COMMERCIAL

## 2023-10-11 ENCOUNTER — ANESTHESIA EVENT (OUTPATIENT)
Facility: HOSPITAL | Age: 73
End: 2023-10-11
Payer: COMMERCIAL

## 2023-10-11 VITALS
RESPIRATION RATE: 13 BRPM | WEIGHT: 230 LBS | HEIGHT: 73 IN | BODY MASS INDEX: 30.48 KG/M2 | HEART RATE: 60 BPM | TEMPERATURE: 97.8 F | SYSTOLIC BLOOD PRESSURE: 126 MMHG | OXYGEN SATURATION: 96 % | DIASTOLIC BLOOD PRESSURE: 65 MMHG

## 2023-10-11 DIAGNOSIS — I48.91 ATRIAL FIBRILLATION, UNSPECIFIED TYPE (HCC): ICD-10-CM

## 2023-10-11 LAB
ABO + RH BLD: NORMAL
ACT BLD: 329 SECS (ref 79–138)
ACT BLD: 341 SECS (ref 79–138)
ACT BLD: 359 SECS (ref 79–138)
BLOOD GROUP ANTIBODIES SERPL: NORMAL
ECHO BSA: 2.32 M2
EKG ATRIAL RATE: 60 BPM
EKG DIAGNOSIS: NORMAL
EKG P AXIS: 77 DEGREES
EKG P-R INTERVAL: 204 MS
EKG Q-T INTERVAL: 422 MS
EKG QRS DURATION: 118 MS
EKG QTC CALCULATION (BAZETT): 422 MS
EKG R AXIS: -39 DEGREES
EKG T AXIS: 22 DEGREES
EKG VENTRICULAR RATE: 60 BPM
SPECIMEN EXP DATE BLD: NORMAL

## 2023-10-11 PROCEDURE — 85347 COAGULATION TIME ACTIVATED: CPT

## 2023-10-11 PROCEDURE — 2580000003 HC RX 258: Performed by: NURSE ANESTHETIST, CERTIFIED REGISTERED

## 2023-10-11 PROCEDURE — 86850 RBC ANTIBODY SCREEN: CPT

## 2023-10-11 PROCEDURE — 93010 ELECTROCARDIOGRAM REPORT: CPT | Performed by: INTERNAL MEDICINE

## 2023-10-11 PROCEDURE — C1766 INTRO/SHEATH,STRBLE,NON-PEEL: HCPCS | Performed by: INTERNAL MEDICINE

## 2023-10-11 PROCEDURE — C1732 CATH, EP, DIAG/ABL, 3D/VECT: HCPCS | Performed by: INTERNAL MEDICINE

## 2023-10-11 PROCEDURE — 86901 BLOOD TYPING SEROLOGIC RH(D): CPT

## 2023-10-11 PROCEDURE — C1894 INTRO/SHEATH, NON-LASER: HCPCS | Performed by: INTERNAL MEDICINE

## 2023-10-11 PROCEDURE — C1769 GUIDE WIRE: HCPCS | Performed by: INTERNAL MEDICINE

## 2023-10-11 PROCEDURE — 93657 TX L/R ATRIAL FIB ADDL: CPT | Performed by: INTERNAL MEDICINE

## 2023-10-11 PROCEDURE — 93613 INTRACARDIAC EPHYS 3D MAPG: CPT | Performed by: INTERNAL MEDICINE

## 2023-10-11 PROCEDURE — 3700000001 HC ADD 15 MINUTES (ANESTHESIA): Performed by: INTERNAL MEDICINE

## 2023-10-11 PROCEDURE — C1759 CATH, INTRA ECHOCARDIOGRAPHY: HCPCS | Performed by: INTERNAL MEDICINE

## 2023-10-11 PROCEDURE — 3700000000 HC ANESTHESIA ATTENDED CARE: Performed by: INTERNAL MEDICINE

## 2023-10-11 PROCEDURE — 6360000002 HC RX W HCPCS: Performed by: NURSE ANESTHETIST, CERTIFIED REGISTERED

## 2023-10-11 PROCEDURE — 2720000010 HC SURG SUPPLY STERILE: Performed by: INTERNAL MEDICINE

## 2023-10-11 PROCEDURE — 93622 COMP EP EVAL L VENTR PAC&REC: CPT | Performed by: INTERNAL MEDICINE

## 2023-10-11 PROCEDURE — 93005 ELECTROCARDIOGRAM TRACING: CPT

## 2023-10-11 PROCEDURE — 76937 US GUIDE VASCULAR ACCESS: CPT | Performed by: INTERNAL MEDICINE

## 2023-10-11 PROCEDURE — 2709999900 HC NON-CHARGEABLE SUPPLY: Performed by: INTERNAL MEDICINE

## 2023-10-11 PROCEDURE — 93656 COMPRE EP EVAL ABLTJ ATR FIB: CPT | Performed by: INTERNAL MEDICINE

## 2023-10-11 PROCEDURE — 2500000003 HC RX 250 WO HCPCS: Performed by: NURSE ANESTHETIST, CERTIFIED REGISTERED

## 2023-10-11 PROCEDURE — 93623 PRGRMD STIMJ&PACG IV RX NFS: CPT | Performed by: INTERNAL MEDICINE

## 2023-10-11 PROCEDURE — 36415 COLL VENOUS BLD VENIPUNCTURE: CPT

## 2023-10-11 PROCEDURE — 93005 ELECTROCARDIOGRAM TRACING: CPT | Performed by: NURSE PRACTITIONER

## 2023-10-11 PROCEDURE — 86900 BLOOD TYPING SEROLOGIC ABO: CPT

## 2023-10-11 PROCEDURE — 2580000003 HC RX 258: Performed by: NURSE PRACTITIONER

## 2023-10-11 PROCEDURE — C1760 CLOSURE DEV, VASC: HCPCS | Performed by: INTERNAL MEDICINE

## 2023-10-11 PROCEDURE — 93662 INTRACARDIAC ECG (ICE): CPT | Performed by: INTERNAL MEDICINE

## 2023-10-11 RX ORDER — FLECAINIDE ACETATE 50 MG/1
50 TABLET ORAL 2 TIMES DAILY
Qty: 60 TABLET | Refills: 0 | Status: SHIPPED | OUTPATIENT
Start: 2023-10-11

## 2023-10-11 RX ORDER — FENTANYL CITRATE 50 UG/ML
INJECTION, SOLUTION INTRAMUSCULAR; INTRAVENOUS PRN
Status: DISCONTINUED | OUTPATIENT
Start: 2023-10-11 | End: 2023-10-11 | Stop reason: SDUPTHER

## 2023-10-11 RX ORDER — SODIUM CHLORIDE 9 MG/ML
INJECTION, SOLUTION INTRAVENOUS PRN
Status: DISCONTINUED | OUTPATIENT
Start: 2023-10-11 | End: 2023-10-11 | Stop reason: HOSPADM

## 2023-10-11 RX ORDER — LIDOCAINE HYDROCHLORIDE 20 MG/ML
INJECTION, SOLUTION EPIDURAL; INFILTRATION; INTRACAUDAL; PERINEURAL PRN
Status: DISCONTINUED | OUTPATIENT
Start: 2023-10-11 | End: 2023-10-11

## 2023-10-11 RX ORDER — SODIUM CHLORIDE 0.9 % (FLUSH) 0.9 %
5-40 SYRINGE (ML) INJECTION PRN
Status: DISCONTINUED | OUTPATIENT
Start: 2023-10-11 | End: 2023-10-11 | Stop reason: HOSPADM

## 2023-10-11 RX ORDER — ACETAMINOPHEN 325 MG/1
650 TABLET ORAL EVERY 4 HOURS PRN
Status: DISCONTINUED | OUTPATIENT
Start: 2023-10-11 | End: 2023-10-11 | Stop reason: HOSPADM

## 2023-10-11 RX ORDER — ONDANSETRON 2 MG/ML
INJECTION INTRAMUSCULAR; INTRAVENOUS PRN
Status: DISCONTINUED | OUTPATIENT
Start: 2023-10-11 | End: 2023-10-11 | Stop reason: SDUPTHER

## 2023-10-11 RX ORDER — PROTAMINE SULFATE 10 MG/ML
INJECTION, SOLUTION INTRAVENOUS PRN
Status: DISCONTINUED | OUTPATIENT
Start: 2023-10-11 | End: 2023-10-11 | Stop reason: SDUPTHER

## 2023-10-11 RX ORDER — DEXAMETHASONE SODIUM PHOSPHATE 4 MG/ML
INJECTION, SOLUTION INTRA-ARTICULAR; INTRALESIONAL; INTRAMUSCULAR; INTRAVENOUS; SOFT TISSUE PRN
Status: DISCONTINUED | OUTPATIENT
Start: 2023-10-11 | End: 2023-10-11 | Stop reason: SDUPTHER

## 2023-10-11 RX ORDER — SODIUM CHLORIDE 0.9 % (FLUSH) 0.9 %
5-40 SYRINGE (ML) INJECTION EVERY 12 HOURS SCHEDULED
Status: DISCONTINUED | OUTPATIENT
Start: 2023-10-11 | End: 2023-10-11 | Stop reason: HOSPADM

## 2023-10-11 RX ORDER — ROCURONIUM BROMIDE 10 MG/ML
INJECTION, SOLUTION INTRAVENOUS PRN
Status: DISCONTINUED | OUTPATIENT
Start: 2023-10-11 | End: 2023-10-11 | Stop reason: SDUPTHER

## 2023-10-11 RX ORDER — SUCCINYLCHOLINE CHLORIDE 20 MG/ML
INJECTION INTRAMUSCULAR; INTRAVENOUS PRN
Status: DISCONTINUED | OUTPATIENT
Start: 2023-10-11 | End: 2023-10-11 | Stop reason: SDUPTHER

## 2023-10-11 RX ORDER — HEPARIN SODIUM 10000 [USP'U]/100ML
INJECTION, SOLUTION INTRAVENOUS CONTINUOUS PRN
Status: DISCONTINUED | OUTPATIENT
Start: 2023-10-11 | End: 2023-10-11 | Stop reason: SDUPTHER

## 2023-10-11 RX ORDER — PANTOPRAZOLE SODIUM 40 MG/1
40 TABLET, DELAYED RELEASE ORAL
Qty: 60 TABLET | Refills: 0 | Status: SHIPPED | OUTPATIENT
Start: 2023-10-11

## 2023-10-11 RX ORDER — HEPARIN SODIUM 1000 [USP'U]/ML
INJECTION, SOLUTION INTRAVENOUS; SUBCUTANEOUS PRN
Status: DISCONTINUED | OUTPATIENT
Start: 2023-10-11 | End: 2023-10-11 | Stop reason: SDUPTHER

## 2023-10-11 RX ADMIN — PROPOFOL 150 MG: 10 INJECTION, EMULSION INTRAVENOUS at 10:19

## 2023-10-11 RX ADMIN — ISOPROTERENOL HYDROCHLORIDE 10 MCG/MIN: 0.2 INJECTION, SOLUTION INTRAMUSCULAR; INTRAVENOUS at 11:57

## 2023-10-11 RX ADMIN — SODIUM CHLORIDE: 9 INJECTION, SOLUTION INTRAVENOUS at 10:17

## 2023-10-11 RX ADMIN — FENTANYL CITRATE 50 MCG: 50 INJECTION, SOLUTION INTRAMUSCULAR; INTRAVENOUS at 10:19

## 2023-10-11 RX ADMIN — HEPARIN SODIUM 13000 UNITS: 1000 INJECTION, SOLUTION INTRAVENOUS; SUBCUTANEOUS at 10:46

## 2023-10-11 RX ADMIN — PROTAMINE SULFATE 5 MG: 10 INJECTION, SOLUTION INTRAVENOUS at 12:09

## 2023-10-11 RX ADMIN — PHENYLEPHRINE HYDROCHLORIDE 40 MCG/MIN: 10 INJECTION INTRAVENOUS at 10:19

## 2023-10-11 RX ADMIN — SUCCINYLCHOLINE CHLORIDE 120 MG: 20 INJECTION, SOLUTION INTRAMUSCULAR; INTRAVENOUS at 10:19

## 2023-10-11 RX ADMIN — DEXAMETHASONE SODIUM PHOSPHATE 4 MG: 4 INJECTION, SOLUTION INTRAMUSCULAR; INTRAVENOUS at 10:54

## 2023-10-11 RX ADMIN — ROCURONIUM BROMIDE 5 MG: 10 SOLUTION INTRAVENOUS at 10:19

## 2023-10-11 RX ADMIN — HEPARIN SODIUM 1300 UNITS/HR: 10000 INJECTION, SOLUTION INTRAVENOUS at 10:46

## 2023-10-11 RX ADMIN — ONDANSETRON HYDROCHLORIDE 4 MG: 2 INJECTION, SOLUTION INTRAMUSCULAR; INTRAVENOUS at 12:05

## 2023-10-11 RX ADMIN — HEPARIN SODIUM 2000 UNITS: 1000 INJECTION, SOLUTION INTRAVENOUS; SUBCUTANEOUS at 11:29

## 2023-10-11 RX ADMIN — HEPARIN SODIUM 1000 UNITS: 1000 INJECTION, SOLUTION INTRAVENOUS; SUBCUTANEOUS at 11:07

## 2023-10-11 RX ADMIN — PROTAMINE SULFATE 45 MG: 10 INJECTION, SOLUTION INTRAVENOUS at 12:12

## 2023-10-11 NOTE — ANESTHESIA POSTPROCEDURE EVALUATION
Department of Anesthesiology  Postprocedure Note    Patient: Ann Anderson   MRN: [id number]  YOB: 1950  Date of evaluation: 10/11/2023      Procedure Summary     Date: 10/11/23 Room / Location: [facility] CATH LAB 3 / [facility] CARDIAC CATH LAB    Anesthesia Start: 6826 Anesthesia Stop: 9980    Procedures:       Ablation A-fib w complete ep study      Ultrasound guided vascular access      Intracardiac echocardiogram      Drug stimulation      Ep 3d mapping Diagnosis:       Atrial fibrillation, unspecified type (HCC)      (Atrial fibrillation, unspecified type ([address]) [I48.91])    Providers: Andrés Saini MD Responsible Provider: Delaney Rojas DO    Anesthesia Type: General ASA Status: 3          Anesthesia Type: General    Clayton Phase I: Clayton Score: 10    Clayton Phase II:        Anesthesia Post Evaluation    Patient location during evaluation: bedside  Patient participation: complete - patient participated  Level of consciousness: awake and alert  Pain score: 0  Airway patency: patent  Nausea & Vomiting: no nausea and no vomiting  Complications: no  Cardiovascular status: blood pressure returned to baseline and hemodynamically stable  Respiratory status: room air  Hydration status: euvolemic  Pain management: adequate

## 2023-10-11 NOTE — PROGRESS NOTES
Cardiac Cath Lab Recovery Arrival Note:      Bessy Nunes. arrived to Cardiac Cath Lab, Recovery Area. Staff introduced to patient. Patient identifiers verified with NAME and DATE OF BIRTH. Procedure verified with patient. Consent forms reviewed and signed by patient or authorized representative and verified. Allergies verified. Patient and family oriented to department. Patient and family informed of procedure and plan of care. Questions answered with review. Patient prepped for procedure, per orders from physician, prior to arrival.    Patient on cardiac monitor, non-invasive blood pressure, SPO2 monitor. On room air. Patient is A&Ox 4. Patient reports no pain. Patient in stretcher, in low position, with side rails up, call bell within reach, patient instructed to call if assistance as needed. Patient prep in: [facility], [facility] 4.   Patient family has pager # [phone]  Family in: [phone].    Prep by: INEZ and MS

## 2023-10-11 NOTE — PROGRESS NOTES
Cardiac Cath Lab Procedure Area Arrival Note:    Harmeet Gentile. arrived to Cardiac Cath Lab, Procedure Area. Patient identifiers verified with NAME and DATE OF BIRTH. Procedure verified with patient. Consent forms verified. Allergies verified. Patient informed of procedure and plan of care. Questions answered with review. Patient voiced understanding of procedure and plan of care. Patient on cardiac monitor, non-invasive blood pressure, SPO2 monitor. On room air; airway to be managed by CRNA for duration of procedure. IV of normal saline on pump at 25 ml/hr. Patient status doing well without problems. Patient is A&Ox 4. Patient reports no complaints of pain or shortness of breath. Patient medicated during procedure with orders obtained and verified by Dr. Felecia Guillaume. Refer to patients Cardiac Cath Lab PROCEDURE REPORT for vital signs, assessment, status, and response during procedure, printed at end of case. Printed report on chart or scanned into chart.

## 2023-10-11 NOTE — PROCEDURES
RADIOFREQUENCY ATRIAL FIBRILLATION ABLATION  SUBSTRATE MODIFICATION ABLATION    Procedure Date: 10/11/23   Lab Physician: Hyacinth Burks MD    Indications:  73 yo M with a history of JEAN CLAUDE, SSS s/p dual chamber [employer] PPM, history of AFL s/p AFL ablation (10/15/20-Dr. Devorah James), history of WM implantation (3/21/21-Dr. Devorah James), HTN now with increasing AF burden from 5-->15% referred for Afib ablation. SHEATH INSERTION  All sheaths were placed using the modified Seldinger technique with ultrasound guided assistance  Right Femoral Vein: 8.5Fr Agilis sheath  Left Femoral Vein: 8Fr and a 11F sheath    CATHETER INSERTION  Catheters were advanced to the following positions using fluoroscopic guidance:  Coronary Sinus: : [employer] Bidirectional Decapolar  LA: [employer] OctaRay Mapping catheter  Ablation: [employer] ST/SF D/F ablation catheter  ICE in RA/RV: [employer] Intracardiac Ultrasound    PROCEDURE NARRATIVE:  EPS and RFA were discussed with the patient in great detail. The risks of bleeding, infection, vascular injury, pulmonary embolus, cardiac perforation, heart block necessitating pacemaker insertion, stroke, MI and death were among those discussed. Alternatives were reviewed including the option of no therapy. All questions were answered. The patient agreed to proceed. Written informed consent was obtained from the patient after a full explanation of the risks and benefits of the procedure. The patient was brought to the lab in the fasting state. Continuous electrocardiographic and hemodynamic monitoring was initiated. The patient was prepped and draped in the usual sterile fashion. General anesthesia with intubation and mechanical conventional ventilation was used. Anesthesia staff performed the intubation and monitoring during the case. Esophageal temperature monitoring was performed with the CIRCA esophageal temperature probe throughout the case.      Vascular Access  Vascular sites were accessed using the

## 2023-10-11 NOTE — PROGRESS NOTES
1435 Ambulated patient to the bathroom with a steady gait, voided without difficulty. Patient denies chest pain, shortness of breath, or dizziness. Returned to stretcher. Vital signs stable. 1440 Procedural site is clean, dry, and intact. No bleeding, no hematoma. 1440 Assisted patient in putting on socks and shoes, otherwise Patient dressed self. 26 Educated patient about their sedation precautions such as not driving, operating any machinery, or signing legal documents 24 hours post procedure. 1415 Reviewed discharge instructions, including medications and site care using the teach back method. Answered all questions. Verbalized understanding. 1435 Removed peripheral IV.    1445 Escorted to discharge area in a wheelchair with all of their belongings including discharge instructions    Patient's spouse present to take patient home.

## 2023-10-11 NOTE — PROGRESS NOTES
EP LAB to Recovery Room Report    Procedure: Afib ablation    MD: Dr Jeannie Link heart rhythm: Permanent paced  Verbal Report given to Recovery Nurse on patient being transferred to Recovery Room for routine post-op. Patient stable upon transfer to . Pt had general anesthesia. Vitals, mental status, MAR, procedural summary discussed with recovery RN. A total of Heparin 16,000 units given. Protamine 50 mg given post ablation. Post-procedure heart rhythm: Permanent paced      Sheaths:    Right femoral vein 8.5 fr sheath removed, closed with Perclose at 1209. Left femoral vein 8 fr sheath removed, closed with Perclose at 1212. Left femoral vein 11 fr sheath removed, closed with Perclose at 1213.

## 2023-11-15 ENCOUNTER — OFFICE VISIT (OUTPATIENT)
Age: 73
End: 2023-11-15
Payer: COMMERCIAL

## 2023-11-15 ENCOUNTER — PROCEDURE VISIT (OUTPATIENT)
Age: 73
End: 2023-11-15

## 2023-11-15 VITALS
HEART RATE: 60 BPM | BODY MASS INDEX: 31.12 KG/M2 | OXYGEN SATURATION: 98 % | RESPIRATION RATE: 16 BRPM | WEIGHT: 234.8 LBS | SYSTOLIC BLOOD PRESSURE: 156 MMHG | HEIGHT: 73 IN | DIASTOLIC BLOOD PRESSURE: 76 MMHG

## 2023-11-15 DIAGNOSIS — I48.0 PAF (PAROXYSMAL ATRIAL FIBRILLATION) (HCC): Primary | ICD-10-CM

## 2023-11-15 DIAGNOSIS — Z95.818 PRESENCE OF WATCHMAN LEFT ATRIAL APPENDAGE CLOSURE DEVICE: ICD-10-CM

## 2023-11-15 DIAGNOSIS — G47.33 OBSTRUCTIVE SLEEP APNEA: ICD-10-CM

## 2023-11-15 DIAGNOSIS — Z95.0 CARDIAC PACEMAKER IN SITU: Primary | ICD-10-CM

## 2023-11-15 DIAGNOSIS — Z95.0 PACEMAKER: ICD-10-CM

## 2023-11-15 PROCEDURE — 99214 OFFICE O/P EST MOD 30 MIN: CPT | Performed by: INTERNAL MEDICINE

## 2023-11-15 PROCEDURE — 93000 ELECTROCARDIOGRAM COMPLETE: CPT | Performed by: INTERNAL MEDICINE

## 2023-11-15 PROCEDURE — 1123F ACP DISCUSS/DSCN MKR DOCD: CPT | Performed by: INTERNAL MEDICINE

## 2023-11-15 ASSESSMENT — PATIENT HEALTH QUESTIONNAIRE - PHQ9
SUM OF ALL RESPONSES TO PHQ QUESTIONS 1-9: 0
2. FEELING DOWN, DEPRESSED OR HOPELESS: 0
SUM OF ALL RESPONSES TO PHQ9 QUESTIONS 1 & 2: 0
SUM OF ALL RESPONSES TO PHQ QUESTIONS 1-9: 0
1. LITTLE INTEREST OR PLEASURE IN DOING THINGS: 0
SUM OF ALL RESPONSES TO PHQ QUESTIONS 1-9: 0
SUM OF ALL RESPONSES TO PHQ QUESTIONS 1-9: 0

## 2023-11-15 NOTE — PROGRESS NOTES
Per verbal order from Dr. Khoa Heaton  Last appt: 11/15/2023     Future Appointments   Date Time Provider [address]   7/26/2024  8:40 AM PACEMAKERGIOVANY   7/26/2024  9:00 AM CELESTINO Perry - NP [facility]       Requested Prescriptions     Signed Prescriptions Disp Refills    rivaroxaban (XARELTO) 20 MG TABS tablet 28 tablet 0     Sig: Take 1 tablet by mouth Daily with supper

## 2023-11-15 NOTE — PROGRESS NOTES
Room #: 2    Feeling great post ablation. Chief Complaint   Patient presents with    Follow-up    Atrial Fibrillation       Vitals:    11/15/23 0756   BP: (!) 156/76   Site: Left Upper Arm   Position: Sitting   Cuff Size: Medium Adult   Pulse: 60   Resp: 16   SpO2: 98%   Weight: 106.5 kg (234 lb 12.8 oz)   Height: 1.854 m (6' 1\")         Chest pain:  NO  Shortness of breath:  NO  Edema: NO  Palpitations, skipped beats, rapid heartbeat:  NO  Dizziness:  NO    1. Have you been to the ER, urgent care clinic since your last visit? Hospitalized since your last visit? NO    2. Have you seen or consulted any other health care providers outside of the [address] [place] [address] since your last visit? Include any pap smears or colon screening.  NO      Refills:  NO
motion  Lungs:  Clear to auscultation bilaterally, no wheezes/rales/rhonchi   Cardiovascular:  Regular rate and rhythm, normal S1-S2, no murmurs/rubs/gallops  Abdomen:  Soft, nontender, nondistended, normoactive bowel sounds  Skin:  Intact, no rash  Extremities:, no clubbing, cyanosis, or edema  Musculoskeletal: normal range of motion  Neurological:  Alert and oriented, no focal neurologic deficits  Psychiatric:  Normal mood and affect    EKG: atrial paced at 60 bpm, iRBBB,  ms. Medications:     Current Outpatient Medications   Medication Sig    FIBER PO Take 6 capsules by mouth 2 times daily    Multiple Vitamins-Minerals (ONE-A-DAY MENS 50+ PO) Take by mouth daily    rivaroxaban (XARELTO) 20 MG TABS tablet Take 1 tablet by mouth Daily with supper    aspirin 81 MG EC tablet Take 1 tablet by mouth daily    atorvastatin (LIPITOR) 10 MG tablet Take 1 tablet by mouth daily    lisinopril (PRINIVIL;ZESTRIL) 10 MG tablet Take 1 tablet by mouth daily     No current facility-administered medications for this visit. Diagnostic Data Review:     No results found for this or any previous visit. No results found for this or any previous visit. No results found for this or any previous visit. [unfilled]   No specialty comments available. Lab Review:   No results found for: \"CHOL\", \"CHOLX\", \"CHLST\", \"CHOLV\", \"654516\", \"HDL\", \"HDLC\", \"LDL\", \"LDLC\", \"TGLX\", \"TRIGL\"  No results found for: \"MARIA ELENA\", \"CREAPOC\", \"CREA\"  Lab Results   Component Value Date/Time    BUN 16 09/19/2023 12:06 PM     Lab Results   Component Value Date/Time    K 4.3 09/19/2023 12:06 PM     No results found for: \"HBA1C\"  Lab Results   Component Value Date/Time    HGB 14.0 09/19/2023 12:06 PM     Lab Results   Component Value Date/Time     09/19/2023 12:06 PM     No results for input(s): \"CPK\", \"CKMB\" in the last 72 hours.     Invalid input(s): \"CKQMB\", \"CPKMB\",

## 2023-11-15 NOTE — PATIENT INSTRUCTIONS
Stop Xarelto on 12/11/23, restart Aspirin  FU with NP in 3 months  FU with Dr. Alka Hutchinson in 1 year  Cont remote transmission every 3 months    [facility]  CPAP compliance  Goal weight loss of 10%, 23 lbs  Goal blood pressure less than 140/90 mmHg

## 2024-03-25 ENCOUNTER — HOSPITAL ENCOUNTER (INPATIENT)
Facility: HOSPITAL | Age: 74
LOS: 5 days | Discharge: HOME | End: 2024-03-30
Attending: STUDENT IN AN ORGANIZED HEALTH CARE EDUCATION/TRAINING PROGRAM | Admitting: SURGERY
Payer: COMMERCIAL

## 2024-03-25 ENCOUNTER — APPOINTMENT (OUTPATIENT)
Facility: HOSPITAL | Age: 74
End: 2024-03-25
Payer: COMMERCIAL

## 2024-03-25 ENCOUNTER — ANESTHESIA EVENT (OUTPATIENT)
Facility: HOSPITAL | Age: 74
End: 2024-03-25
Payer: COMMERCIAL

## 2024-03-25 ENCOUNTER — ANESTHESIA (OUTPATIENT)
Facility: HOSPITAL | Age: 74
End: 2024-03-25
Payer: COMMERCIAL

## 2024-03-25 DIAGNOSIS — K81.0 ACUTE CHOLECYSTITIS: Primary | ICD-10-CM

## 2024-03-25 DIAGNOSIS — K81.9 CHOLECYSTITIS: ICD-10-CM

## 2024-03-25 LAB
ALBUMIN SERPL-MCNC: 3.9 G/DL (ref 3.5–5)
ALBUMIN/GLOB SERPL: 1.3 (ref 1.1–2.2)
ALP SERPL-CCNC: 118 U/L (ref 45–117)
ALT SERPL-CCNC: 82 U/L (ref 12–78)
ANION GAP SERPL CALC-SCNC: 7 MMOL/L (ref 5–15)
APPEARANCE UR: CLEAR
AST SERPL-CCNC: 21 U/L (ref 15–37)
BACTERIA URNS QL MICRO: NEGATIVE /HPF
BASOPHILS # BLD: 0 K/UL (ref 0–0.1)
BASOPHILS NFR BLD: 0 % (ref 0–1)
BILIRUB SERPL-MCNC: 0.9 MG/DL (ref 0.2–1)
BILIRUB UR QL: NEGATIVE
BUN SERPL-MCNC: 20 MG/DL (ref 6–20)
BUN/CREAT SERPL: 19 (ref 12–20)
CALCIUM SERPL-MCNC: 9.9 MG/DL (ref 8.5–10.1)
CHLORIDE SERPL-SCNC: 106 MMOL/L (ref 97–108)
CO2 SERPL-SCNC: 27 MMOL/L (ref 21–32)
COLOR UR: ABNORMAL
COMMENT:: NORMAL
CREAT SERPL-MCNC: 1.06 MG/DL (ref 0.7–1.3)
DIFFERENTIAL METHOD BLD: ABNORMAL
EKG ATRIAL RATE: 60 BPM
EKG DIAGNOSIS: NORMAL
EKG P AXIS: 74 DEGREES
EKG P-R INTERVAL: 176 MS
EKG Q-T INTERVAL: 412 MS
EKG QRS DURATION: 106 MS
EKG QTC CALCULATION (BAZETT): 412 MS
EKG R AXIS: -46 DEGREES
EKG T AXIS: 50 DEGREES
EKG VENTRICULAR RATE: 60 BPM
EOSINOPHIL # BLD: 0 K/UL (ref 0–0.4)
EOSINOPHIL NFR BLD: 0 % (ref 0–7)
EPITH CASTS URNS QL MICRO: ABNORMAL /LPF
ERYTHROCYTE [DISTWIDTH] IN BLOOD BY AUTOMATED COUNT: 13.5 % (ref 11.5–14.5)
GLOBULIN SER CALC-MCNC: 2.9 G/DL (ref 2–4)
GLUCOSE BLD STRIP.AUTO-MCNC: 137 MG/DL (ref 65–117)
GLUCOSE SERPL-MCNC: 149 MG/DL (ref 65–100)
GLUCOSE UR STRIP.AUTO-MCNC: NEGATIVE MG/DL
HCT VFR BLD AUTO: 45.2 % (ref 36.6–50.3)
HGB BLD-MCNC: 14.9 G/DL (ref 12.1–17)
HGB UR QL STRIP: NEGATIVE
HYALINE CASTS URNS QL MICRO: ABNORMAL /LPF (ref 0–2)
IMM GRANULOCYTES # BLD AUTO: 0 K/UL (ref 0–0.04)
IMM GRANULOCYTES NFR BLD AUTO: 0 % (ref 0–0.5)
KETONES UR QL STRIP.AUTO: NEGATIVE MG/DL
LEUKOCYTE ESTERASE UR QL STRIP.AUTO: ABNORMAL
LIPASE SERPL-CCNC: 23 U/L (ref 13–75)
LYMPHOCYTES # BLD: 0.7 K/UL (ref 0.8–3.5)
LYMPHOCYTES NFR BLD: 5 % (ref 12–49)
MCH RBC QN AUTO: 27.2 PG (ref 26–34)
MCHC RBC AUTO-ENTMCNC: 33 G/DL (ref 30–36.5)
MCV RBC AUTO: 82.6 FL (ref 80–99)
MONOCYTES # BLD: 1.2 K/UL (ref 0–1)
MONOCYTES NFR BLD: 8 % (ref 5–13)
NEUTS SEG # BLD: 12.5 K/UL (ref 1.8–8)
NEUTS SEG NFR BLD: 87 % (ref 32–75)
NITRITE UR QL STRIP.AUTO: NEGATIVE
NRBC # BLD: 0 K/UL (ref 0–0.01)
NRBC BLD-RTO: 0 PER 100 WBC
PH UR STRIP: 5 (ref 5–8)
PLATELET # BLD AUTO: 227 K/UL (ref 150–400)
PMV BLD AUTO: 10.7 FL (ref 8.9–12.9)
POTASSIUM SERPL-SCNC: 4.3 MMOL/L (ref 3.5–5.1)
PROT SERPL-MCNC: 6.8 G/DL (ref 6.4–8.2)
PROT UR STRIP-MCNC: ABNORMAL MG/DL
RBC # BLD AUTO: 5.47 M/UL (ref 4.1–5.7)
RBC #/AREA URNS HPF: ABNORMAL /HPF (ref 0–5)
RBC MORPH BLD: ABNORMAL
SERVICE CMNT-IMP: ABNORMAL
SODIUM SERPL-SCNC: 140 MMOL/L (ref 136–145)
SP GR UR REFRACTOMETRY: 1.02 (ref 1–1.03)
SPECIMEN HOLD: NORMAL
SPECIMEN HOLD: NORMAL
TROPONIN I SERPL HS-MCNC: 37 NG/L (ref 0–76)
TROPONIN I SERPL HS-MCNC: 37 NG/L (ref 0–76)
UROBILINOGEN UR QL STRIP.AUTO: 1 EU/DL (ref 0.2–1)
WBC # BLD AUTO: 14.4 K/UL (ref 4.1–11.1)
WBC URNS QL MICRO: ABNORMAL /HPF (ref 0–4)

## 2024-03-25 PROCEDURE — 6360000002 HC RX W HCPCS: Performed by: SURGERY

## 2024-03-25 PROCEDURE — 3600000009 HC SURGERY ROBOT BASE: Performed by: SURGERY

## 2024-03-25 PROCEDURE — 0DN80ZZ RELEASE SMALL INTESTINE, OPEN APPROACH: ICD-10-PCS | Performed by: SURGERY

## 2024-03-25 PROCEDURE — 3600000019 HC SURGERY ROBOT ADDTL 15MIN: Performed by: SURGERY

## 2024-03-25 PROCEDURE — 2500000003 HC RX 250 WO HCPCS: Performed by: SURGERY

## 2024-03-25 PROCEDURE — 2709999900 HC NON-CHARGEABLE SUPPLY: Performed by: SURGERY

## 2024-03-25 PROCEDURE — 6360000002 HC RX W HCPCS: Performed by: NURSE ANESTHETIST, CERTIFIED REGISTERED

## 2024-03-25 PROCEDURE — 7100000001 HC PACU RECOVERY - ADDTL 15 MIN: Performed by: SURGERY

## 2024-03-25 PROCEDURE — 88304 TISSUE EXAM BY PATHOLOGIST: CPT

## 2024-03-25 PROCEDURE — P9045 ALBUMIN (HUMAN), 5%, 250 ML: HCPCS | Performed by: NURSE ANESTHETIST, CERTIFIED REGISTERED

## 2024-03-25 PROCEDURE — 88307 TISSUE EXAM BY PATHOLOGIST: CPT

## 2024-03-25 PROCEDURE — 6360000002 HC RX W HCPCS: Performed by: STUDENT IN AN ORGANIZED HEALTH CARE EDUCATION/TRAINING PROGRAM

## 2024-03-25 PROCEDURE — 2500000003 HC RX 250 WO HCPCS: Performed by: NURSE ANESTHETIST, CERTIFIED REGISTERED

## 2024-03-25 PROCEDURE — 0FT44ZZ RESECTION OF GALLBLADDER, PERCUTANEOUS ENDOSCOPIC APPROACH: ICD-10-PCS | Performed by: SURGERY

## 2024-03-25 PROCEDURE — 82962 GLUCOSE BLOOD TEST: CPT

## 2024-03-25 PROCEDURE — 2580000003 HC RX 258: Performed by: SURGERY

## 2024-03-25 PROCEDURE — 99285 EMERGENCY DEPT VISIT HI MDM: CPT

## 2024-03-25 PROCEDURE — 6360000004 HC RX CONTRAST MEDICATION: Performed by: STUDENT IN AN ORGANIZED HEALTH CARE EDUCATION/TRAINING PROGRAM

## 2024-03-25 PROCEDURE — 85025 COMPLETE CBC W/AUTO DIFF WBC: CPT

## 2024-03-25 PROCEDURE — 3700000000 HC ANESTHESIA ATTENDED CARE: Performed by: SURGERY

## 2024-03-25 PROCEDURE — 76705 ECHO EXAM OF ABDOMEN: CPT

## 2024-03-25 PROCEDURE — 1100000000 HC RM PRIVATE

## 2024-03-25 PROCEDURE — 96374 THER/PROPH/DIAG INJ IV PUSH: CPT

## 2024-03-25 PROCEDURE — S2900 ROBOTIC SURGICAL SYSTEM: HCPCS | Performed by: SURGERY

## 2024-03-25 PROCEDURE — 93010 ELECTROCARDIOGRAM REPORT: CPT | Performed by: INTERNAL MEDICINE

## 2024-03-25 PROCEDURE — 2580000003 HC RX 258: Performed by: STUDENT IN AN ORGANIZED HEALTH CARE EDUCATION/TRAINING PROGRAM

## 2024-03-25 PROCEDURE — 2720000010 HC SURG SUPPLY STERILE: Performed by: SURGERY

## 2024-03-25 PROCEDURE — 84484 ASSAY OF TROPONIN QUANT: CPT

## 2024-03-25 PROCEDURE — 8E0W4CZ ROBOTIC ASSISTED PROCEDURE OF TRUNK REGION, PERCUTANEOUS ENDOSCOPIC APPROACH: ICD-10-PCS | Performed by: SURGERY

## 2024-03-25 PROCEDURE — 74177 CT ABD & PELVIS W/CONTRAST: CPT

## 2024-03-25 PROCEDURE — 81001 URINALYSIS AUTO W/SCOPE: CPT

## 2024-03-25 PROCEDURE — 94761 N-INVAS EAR/PLS OXIMETRY MLT: CPT

## 2024-03-25 PROCEDURE — 7100000000 HC PACU RECOVERY - FIRST 15 MIN: Performed by: SURGERY

## 2024-03-25 PROCEDURE — 6360000002 HC RX W HCPCS: Performed by: ANESTHESIOLOGY

## 2024-03-25 PROCEDURE — 0DB80ZZ EXCISION OF SMALL INTESTINE, OPEN APPROACH: ICD-10-PCS | Performed by: SURGERY

## 2024-03-25 PROCEDURE — 80053 COMPREHEN METABOLIC PANEL: CPT

## 2024-03-25 PROCEDURE — 93005 ELECTROCARDIOGRAM TRACING: CPT | Performed by: STUDENT IN AN ORGANIZED HEALTH CARE EDUCATION/TRAINING PROGRAM

## 2024-03-25 PROCEDURE — 36415 COLL VENOUS BLD VENIPUNCTURE: CPT

## 2024-03-25 PROCEDURE — 3700000001 HC ADD 15 MINUTES (ANESTHESIA): Performed by: SURGERY

## 2024-03-25 PROCEDURE — 83690 ASSAY OF LIPASE: CPT

## 2024-03-25 RX ORDER — NALOXONE HYDROCHLORIDE 0.4 MG/ML
INJECTION, SOLUTION INTRAMUSCULAR; INTRAVENOUS; SUBCUTANEOUS PRN
Status: DISCONTINUED | OUTPATIENT
Start: 2024-03-25 | End: 2024-03-25 | Stop reason: HOSPADM

## 2024-03-25 RX ORDER — ONDANSETRON 2 MG/ML
4 INJECTION INTRAMUSCULAR; INTRAVENOUS EVERY 6 HOURS PRN
Status: DISCONTINUED | OUTPATIENT
Start: 2024-03-25 | End: 2024-03-30 | Stop reason: HOSPADM

## 2024-03-25 RX ORDER — SUCCINYLCHOLINE/SOD CL,ISO/PF 100 MG/5ML
SYRINGE (ML) INTRAVENOUS PRN
Status: DISCONTINUED | OUTPATIENT
Start: 2024-03-25 | End: 2024-03-25 | Stop reason: SDUPTHER

## 2024-03-25 RX ORDER — MAGNESIUM SULFATE IN WATER 40 MG/ML
2000 INJECTION, SOLUTION INTRAVENOUS PRN
Status: DISCONTINUED | OUTPATIENT
Start: 2024-03-25 | End: 2024-03-30 | Stop reason: HOSPADM

## 2024-03-25 RX ORDER — MORPHINE SULFATE 2 MG/ML
2 INJECTION, SOLUTION INTRAMUSCULAR; INTRAVENOUS
Status: DISCONTINUED | OUTPATIENT
Start: 2024-03-25 | End: 2024-03-30 | Stop reason: HOSPADM

## 2024-03-25 RX ORDER — LISINOPRIL 5 MG/1
10 TABLET ORAL DAILY
Status: DISCONTINUED | OUTPATIENT
Start: 2024-03-25 | End: 2024-03-30 | Stop reason: HOSPADM

## 2024-03-25 RX ORDER — POTASSIUM CHLORIDE 7.45 MG/ML
10 INJECTION INTRAVENOUS PRN
Status: DISCONTINUED | OUTPATIENT
Start: 2024-03-25 | End: 2024-03-30 | Stop reason: HOSPADM

## 2024-03-25 RX ORDER — ENOXAPARIN SODIUM 100 MG/ML
30 INJECTION SUBCUTANEOUS 2 TIMES DAILY
Status: DISCONTINUED | OUTPATIENT
Start: 2024-03-25 | End: 2024-03-25

## 2024-03-25 RX ORDER — FLUCONAZOLE 2 MG/ML
200 INJECTION, SOLUTION INTRAVENOUS EVERY 24 HOURS
Status: DISCONTINUED | OUTPATIENT
Start: 2024-03-25 | End: 2024-03-30 | Stop reason: HOSPADM

## 2024-03-25 RX ORDER — SODIUM CHLORIDE, SODIUM LACTATE, POTASSIUM CHLORIDE, CALCIUM CHLORIDE 600; 310; 30; 20 MG/100ML; MG/100ML; MG/100ML; MG/100ML
INJECTION, SOLUTION INTRAVENOUS CONTINUOUS
Status: DISCONTINUED | OUTPATIENT
Start: 2024-03-25 | End: 2024-03-25 | Stop reason: HOSPADM

## 2024-03-25 RX ORDER — POTASSIUM CHLORIDE 750 MG/1
40 TABLET, FILM COATED, EXTENDED RELEASE ORAL PRN
Status: DISCONTINUED | OUTPATIENT
Start: 2024-03-25 | End: 2024-03-30 | Stop reason: HOSPADM

## 2024-03-25 RX ORDER — SODIUM CHLORIDE 9 MG/ML
INJECTION, SOLUTION INTRAVENOUS PRN
Status: DISCONTINUED | OUTPATIENT
Start: 2024-03-25 | End: 2024-03-30 | Stop reason: HOSPADM

## 2024-03-25 RX ORDER — SODIUM CHLORIDE 0.9 % (FLUSH) 0.9 %
5-40 SYRINGE (ML) INJECTION EVERY 12 HOURS SCHEDULED
Status: DISCONTINUED | OUTPATIENT
Start: 2024-03-25 | End: 2024-03-30 | Stop reason: HOSPADM

## 2024-03-25 RX ORDER — FAMOTIDINE 10 MG/ML
INJECTION, SOLUTION INTRAVENOUS PRN
Status: DISCONTINUED | OUTPATIENT
Start: 2024-03-25 | End: 2024-03-25 | Stop reason: SDUPTHER

## 2024-03-25 RX ORDER — EPHEDRINE SULFATE/0.9% NACL/PF 25 MG/5 ML
SYRINGE (ML) INTRAVENOUS PRN
Status: DISCONTINUED | OUTPATIENT
Start: 2024-03-25 | End: 2024-03-25 | Stop reason: SDUPTHER

## 2024-03-25 RX ORDER — ALBUMIN, HUMAN INJ 5% 5 %
SOLUTION INTRAVENOUS PRN
Status: DISCONTINUED | OUTPATIENT
Start: 2024-03-25 | End: 2024-03-25 | Stop reason: SDUPTHER

## 2024-03-25 RX ORDER — LABETALOL HYDROCHLORIDE 5 MG/ML
5 INJECTION, SOLUTION INTRAVENOUS ONCE
Status: COMPLETED | OUTPATIENT
Start: 2024-03-25 | End: 2024-03-25

## 2024-03-25 RX ORDER — ACETAMINOPHEN 500 MG
1000 TABLET ORAL EVERY 6 HOURS SCHEDULED
Status: DISCONTINUED | OUTPATIENT
Start: 2024-03-26 | End: 2024-03-30 | Stop reason: HOSPADM

## 2024-03-25 RX ORDER — 0.9 % SODIUM CHLORIDE 0.9 %
1000 INTRAVENOUS SOLUTION INTRAVENOUS ONCE
Status: COMPLETED | OUTPATIENT
Start: 2024-03-25 | End: 2024-03-25

## 2024-03-25 RX ORDER — ONDANSETRON 2 MG/ML
INJECTION INTRAMUSCULAR; INTRAVENOUS PRN
Status: DISCONTINUED | OUTPATIENT
Start: 2024-03-25 | End: 2024-03-25 | Stop reason: SDUPTHER

## 2024-03-25 RX ORDER — DEXMEDETOMIDINE HYDROCHLORIDE 100 UG/ML
INJECTION, SOLUTION INTRAVENOUS PRN
Status: DISCONTINUED | OUTPATIENT
Start: 2024-03-25 | End: 2024-03-25 | Stop reason: SDUPTHER

## 2024-03-25 RX ORDER — ONDANSETRON 4 MG/1
4 TABLET, ORALLY DISINTEGRATING ORAL EVERY 8 HOURS PRN
Status: DISCONTINUED | OUTPATIENT
Start: 2024-03-25 | End: 2024-03-30 | Stop reason: HOSPADM

## 2024-03-25 RX ORDER — INDOCYANINE GREEN AND WATER 25 MG
5 KIT INJECTION ONCE
Status: COMPLETED | OUTPATIENT
Start: 2024-03-25 | End: 2024-03-25

## 2024-03-25 RX ORDER — HYDROMORPHONE HYDROCHLORIDE 2 MG/ML
INJECTION, SOLUTION INTRAMUSCULAR; INTRAVENOUS; SUBCUTANEOUS PRN
Status: DISCONTINUED | OUTPATIENT
Start: 2024-03-25 | End: 2024-03-25 | Stop reason: SDUPTHER

## 2024-03-25 RX ORDER — BUPIVACAINE HYDROCHLORIDE 5 MG/ML
INJECTION, SOLUTION EPIDURAL; INTRACAUDAL PRN
Status: DISCONTINUED | OUTPATIENT
Start: 2024-03-25 | End: 2024-03-25 | Stop reason: ALTCHOICE

## 2024-03-25 RX ORDER — PROPOFOL 10 MG/ML
INJECTION, EMULSION INTRAVENOUS PRN
Status: DISCONTINUED | OUTPATIENT
Start: 2024-03-25 | End: 2024-03-25 | Stop reason: SDUPTHER

## 2024-03-25 RX ORDER — ONDANSETRON 2 MG/ML
4 INJECTION INTRAMUSCULAR; INTRAVENOUS
Status: DISCONTINUED | OUTPATIENT
Start: 2024-03-25 | End: 2024-03-25 | Stop reason: HOSPADM

## 2024-03-25 RX ORDER — MORPHINE SULFATE 4 MG/ML
4 INJECTION, SOLUTION INTRAMUSCULAR; INTRAVENOUS
Status: COMPLETED | OUTPATIENT
Start: 2024-03-25 | End: 2024-03-25

## 2024-03-25 RX ORDER — LIDOCAINE HYDROCHLORIDE 20 MG/ML
INJECTION, SOLUTION EPIDURAL; INFILTRATION; INTRACAUDAL; PERINEURAL PRN
Status: DISCONTINUED | OUTPATIENT
Start: 2024-03-25 | End: 2024-03-25 | Stop reason: SDUPTHER

## 2024-03-25 RX ORDER — DIPHENHYDRAMINE HYDROCHLORIDE 50 MG/ML
12.5 INJECTION INTRAMUSCULAR; INTRAVENOUS
Status: DISCONTINUED | OUTPATIENT
Start: 2024-03-25 | End: 2024-03-25 | Stop reason: HOSPADM

## 2024-03-25 RX ORDER — ROCURONIUM BROMIDE 10 MG/ML
INJECTION, SOLUTION INTRAVENOUS PRN
Status: DISCONTINUED | OUTPATIENT
Start: 2024-03-25 | End: 2024-03-25 | Stop reason: SDUPTHER

## 2024-03-25 RX ORDER — SODIUM CHLORIDE, SODIUM LACTATE, POTASSIUM CHLORIDE, CALCIUM CHLORIDE 600; 310; 30; 20 MG/100ML; MG/100ML; MG/100ML; MG/100ML
INJECTION, SOLUTION INTRAVENOUS CONTINUOUS
Status: DISCONTINUED | OUTPATIENT
Start: 2024-03-25 | End: 2024-03-29

## 2024-03-25 RX ORDER — SODIUM CHLORIDE 0.9 % (FLUSH) 0.9 %
5-40 SYRINGE (ML) INJECTION PRN
Status: DISCONTINUED | OUTPATIENT
Start: 2024-03-25 | End: 2024-03-30 | Stop reason: HOSPADM

## 2024-03-25 RX ADMIN — PROPOFOL 100 MG: 10 INJECTION, EMULSION INTRAVENOUS at 19:42

## 2024-03-25 RX ADMIN — HYDROMORPHONE HYDROCHLORIDE 0.5 MG: 2 INJECTION, SOLUTION INTRAMUSCULAR; INTRAVENOUS; SUBCUTANEOUS at 15:27

## 2024-03-25 RX ADMIN — ROCURONIUM BROMIDE 10 MG: 10 INJECTION, SOLUTION INTRAVENOUS at 18:29

## 2024-03-25 RX ADMIN — ROCURONIUM BROMIDE 10 MG: 10 INJECTION, SOLUTION INTRAVENOUS at 17:31

## 2024-03-25 RX ADMIN — SODIUM CHLORIDE, POTASSIUM CHLORIDE, SODIUM LACTATE AND CALCIUM CHLORIDE: 600; 310; 30; 20 INJECTION, SOLUTION INTRAVENOUS at 16:12

## 2024-03-25 RX ADMIN — INDOCYANINE GREEN AND WATER 5 MG: KIT at 15:15

## 2024-03-25 RX ADMIN — PIPERACILLIN AND TAZOBACTAM 4500 MG: 4; .5 INJECTION, POWDER, FOR SOLUTION INTRAVENOUS at 14:02

## 2024-03-25 RX ADMIN — IOPAMIDOL 100 ML: 755 INJECTION, SOLUTION INTRAVENOUS at 11:22

## 2024-03-25 RX ADMIN — EPHEDRINE SULFATE 10 MG: 5 INJECTION INTRAVENOUS at 15:55

## 2024-03-25 RX ADMIN — ALBUMIN (HUMAN) 249 ML: 12.5 INJECTION, SOLUTION INTRAVENOUS at 18:51

## 2024-03-25 RX ADMIN — HYDROMORPHONE HYDROCHLORIDE 0.5 MG: 2 INJECTION, SOLUTION INTRAMUSCULAR; INTRAVENOUS; SUBCUTANEOUS at 17:28

## 2024-03-25 RX ADMIN — SODIUM CHLORIDE, POTASSIUM CHLORIDE, SODIUM LACTATE AND CALCIUM CHLORIDE: 600; 310; 30; 20 INJECTION, SOLUTION INTRAVENOUS at 22:46

## 2024-03-25 RX ADMIN — ROCURONIUM BROMIDE 10 MG: 10 INJECTION, SOLUTION INTRAVENOUS at 15:38

## 2024-03-25 RX ADMIN — EPHEDRINE SULFATE 10 MG: 5 INJECTION INTRAVENOUS at 15:39

## 2024-03-25 RX ADMIN — LABETALOL HYDROCHLORIDE 5 MG: 5 INJECTION, SOLUTION INTRAVENOUS at 21:05

## 2024-03-25 RX ADMIN — HYDROMORPHONE HYDROCHLORIDE 0.5 MG: 2 INJECTION, SOLUTION INTRAMUSCULAR; INTRAVENOUS; SUBCUTANEOUS at 16:42

## 2024-03-25 RX ADMIN — HYDROMORPHONE HYDROCHLORIDE 0.5 MG: 2 INJECTION, SOLUTION INTRAMUSCULAR; INTRAVENOUS; SUBCUTANEOUS at 16:35

## 2024-03-25 RX ADMIN — SODIUM CHLORIDE, PRESERVATIVE FREE 10 ML: 5 INJECTION INTRAVENOUS at 22:37

## 2024-03-25 RX ADMIN — SODIUM CHLORIDE 1000 ML: 9 INJECTION, SOLUTION INTRAVENOUS at 08:53

## 2024-03-25 RX ADMIN — ALBUMIN (HUMAN) 1 ML: 12.5 INJECTION, SOLUTION INTRAVENOUS at 18:42

## 2024-03-25 RX ADMIN — FLUCONAZOLE 200 MG: 200 INJECTION, SOLUTION INTRAVENOUS at 23:08

## 2024-03-25 RX ADMIN — ROCURONIUM BROMIDE 20 MG: 10 INJECTION, SOLUTION INTRAVENOUS at 17:27

## 2024-03-25 RX ADMIN — LIDOCAINE HYDROCHLORIDE 60 MG: 20 INJECTION, SOLUTION EPIDURAL; INFILTRATION; INTRACAUDAL; PERINEURAL at 15:38

## 2024-03-25 RX ADMIN — Medication 140 MG: at 15:39

## 2024-03-25 RX ADMIN — PROPOFOL 30 MG: 10 INJECTION, EMULSION INTRAVENOUS at 16:05

## 2024-03-25 RX ADMIN — FAMOTIDINE 20 MG: 10 INJECTION, SOLUTION INTRAVENOUS at 15:27

## 2024-03-25 RX ADMIN — ONDANSETRON 4 MG: 2 INJECTION INTRAMUSCULAR; INTRAVENOUS at 15:27

## 2024-03-25 RX ADMIN — DEXMEDETOMIDINE 2 MCG: 100 INJECTION, SOLUTION INTRAVENOUS at 16:42

## 2024-03-25 RX ADMIN — DEXMEDETOMIDINE 4 MCG: 100 INJECTION, SOLUTION INTRAVENOUS at 16:47

## 2024-03-25 RX ADMIN — SODIUM CHLORIDE, POTASSIUM CHLORIDE, SODIUM LACTATE AND CALCIUM CHLORIDE: 600; 310; 30; 20 INJECTION, SOLUTION INTRAVENOUS at 16:29

## 2024-03-25 RX ADMIN — HYDROMORPHONE HYDROCHLORIDE 0.5 MG: 1 INJECTION, SOLUTION INTRAMUSCULAR; INTRAVENOUS; SUBCUTANEOUS at 21:18

## 2024-03-25 RX ADMIN — ROCURONIUM BROMIDE 10 MG: 10 INJECTION, SOLUTION INTRAVENOUS at 16:47

## 2024-03-25 RX ADMIN — MORPHINE SULFATE 4 MG: 4 INJECTION INTRAVENOUS at 08:53

## 2024-03-25 RX ADMIN — ROCURONIUM BROMIDE 30 MG: 10 INJECTION, SOLUTION INTRAVENOUS at 15:47

## 2024-03-25 RX ADMIN — PROPOFOL 150 MG: 10 INJECTION, EMULSION INTRAVENOUS at 15:38

## 2024-03-25 RX ADMIN — ROCURONIUM BROMIDE 10 MG: 10 INJECTION, SOLUTION INTRAVENOUS at 16:31

## 2024-03-25 RX ADMIN — ROCURONIUM BROMIDE 10 MG: 10 INJECTION, SOLUTION INTRAVENOUS at 18:27

## 2024-03-25 RX ADMIN — DEXMEDETOMIDINE 4 MCG: 100 INJECTION, SOLUTION INTRAVENOUS at 19:39

## 2024-03-25 RX ADMIN — SODIUM CHLORIDE, POTASSIUM CHLORIDE, SODIUM LACTATE AND CALCIUM CHLORIDE: 600; 310; 30; 20 INJECTION, SOLUTION INTRAVENOUS at 18:42

## 2024-03-25 RX ADMIN — SODIUM CHLORIDE, POTASSIUM CHLORIDE, SODIUM LACTATE AND CALCIUM CHLORIDE: 600; 310; 30; 20 INJECTION, SOLUTION INTRAVENOUS at 14:00

## 2024-03-25 RX ADMIN — ROCURONIUM BROMIDE 20 MG: 10 INJECTION, SOLUTION INTRAVENOUS at 18:59

## 2024-03-25 ASSESSMENT — ENCOUNTER SYMPTOMS
COUGH: 0
SHORTNESS OF BREATH: 0
SORE THROAT: 0
NAUSEA: 0
DYSPNEA ACTIVITY LEVEL: NO INTERVAL CHANGE
EYE PAIN: 0
VOMITING: 0
DIARRHEA: 0
ABDOMINAL PAIN: 1

## 2024-03-25 ASSESSMENT — PAIN DESCRIPTION - LOCATION
LOCATION: ABDOMEN

## 2024-03-25 ASSESSMENT — LIFESTYLE VARIABLES
HOW OFTEN DO YOU HAVE A DRINK CONTAINING ALCOHOL: NEVER
HOW MANY STANDARD DRINKS CONTAINING ALCOHOL DO YOU HAVE ON A TYPICAL DAY: PATIENT DOES NOT DRINK

## 2024-03-25 ASSESSMENT — PAIN DESCRIPTION - PAIN TYPE
TYPE: SURGICAL PAIN
TYPE: SURGICAL PAIN

## 2024-03-25 ASSESSMENT — PAIN SCALES - GENERAL
PAINLEVEL_OUTOF10: 4
PAINLEVEL_OUTOF10: 6
PAINLEVEL_OUTOF10: 9
PAINLEVEL_OUTOF10: 10

## 2024-03-25 ASSESSMENT — PAIN DESCRIPTION - DESCRIPTORS
DESCRIPTORS: ACHING
DESCRIPTORS: ACHING

## 2024-03-25 ASSESSMENT — PAIN DESCRIPTION - ORIENTATION
ORIENTATION: ANTERIOR
ORIENTATION: RIGHT;UPPER
ORIENTATION: ANTERIOR

## 2024-03-25 ASSESSMENT — PAIN - FUNCTIONAL ASSESSMENT
PAIN_FUNCTIONAL_ASSESSMENT: NONE - DENIES PAIN
PAIN_FUNCTIONAL_ASSESSMENT: 0-10

## 2024-03-25 NOTE — ED PROVIDER NOTES
[facility] EMERGENCY DEPT  EMERGENCY DEPARTMENT ENCOUNTER      Pt Name: Ulices Bosch Jr.  MRN: [id number]  Birthdate 1950  Date of evaluation: 3/25/2024  Provider: MELODY SANTO    CHIEF COMPLAINT       Chief Complaint   Patient presents with    Abdominal Pain         HISTORY OF PRESENT ILLNESS   (Location/Symptom, Timing/Onset, Context/Setting, Quality, Duration, Modifying Factors, Severity)  Note limiting factors.   74 y.o. male with history of arthritis, diverticulitis, GERD, PAF, cardiac pacemaker presents to ED with abdominal pain. Patient reports that around 11pm last night he started with a sharp, constant epigastric abdominal pain radiating into his lower abdomen. Patient had been seen at this ER on 06/04/2023 for similar symptoms, was diagnosed with gallstones but was discharged, referred to follow-up if his symptoms return. He reports that since then he has not had another \"attack\", on minor occasional similar pains. He never followed up with a surgeon in regards to this. Denies any N/V/D, dysuria, urinary frequency, fevers, chills, CP, SOB. Patient has history of total colectomy and hernia repair but no other abdominal surgeries.     The history is provided by the patient.         Review of External Medical Records:     Nursing Notes were reviewed.    REVIEW OF SYSTEMS    (2-9 systems for level 4, 10 or more for level 5)     Review of Systems   Constitutional:  Negative for chills and fever.   HENT:  Negative for congestion, ear pain and sore throat.    Eyes:  Negative for pain.   Respiratory:  Negative for cough and shortness of breath.    Cardiovascular:  Negative for chest pain.   Gastrointestinal:  Positive for abdominal pain. Negative for diarrhea, nausea and vomiting.   Genitourinary:  Negative for dysuria and flank pain.   Musculoskeletal:  Negative for myalgias.   Skin:  Negative for rash.   Neurological:  Negative for dizziness and headaches.   Hematological:  Negative for  will be admitted, awaiting inpatient room placement.     Amount and/or Complexity of Data Reviewed  Labs: ordered. Decision-making details documented in ED Course.  Radiology: ordered.  ECG/medicine tests: ordered.    Risk  Prescription drug management.  Decision regarding hospitalization.            REASSESSMENT     ED Course as of 03/25/24 1304   Mon Mar 25, 2024   0841 EKG time 8:39 AM  A paced rhythm rate of 60 with a leftward axis, narrow QRS, normal QTc, nonspecific ST-T wave changes without ST elevations or depressions. [CC]   0912 Troponin, High Sensitivity: 37  Rpt ordered [AH]   1044 Given elevated WBC and unimpressive ultrasound ordered CT scan to assess for further acute abdominal pathology.  Given gallstones but no ductal dilation or wall thickening with normal LFTs no indication for emergent surgical consult at this time. [AH]   1103 Troponin, High Sensitivity: 37 [AH]   1210 Given CT findings, consult for general surgery placed [AH]      ED Course User Index  [AH] Breonna Amaya PA  [CC] Bucky Lynch E, DO           CONSULTS:  IP CONSULT TO GENERAL SURGERY    PROCEDURES:  Unless otherwise noted below, none     Procedures      FINAL IMPRESSION      1. Acute cholecystitis          DISPOSITION/PLAN   DISPOSITION Decision To Admit 03/25/2024 12:58:47 PM      PATIENT REFERRED TO:  No follow-up provider specified.    DISCHARGE MEDICATIONS:  New Prescriptions    No medications on file         (Please note that portions of this note were completed with a voice recognition program.  Efforts were made to edit the dictations but occasionally words are mis-transcribed.)    MELODY SANTO (electronically signed)  Emergency Attending Physician / Physician Assistant / Nurse Practitioner              Breonna Amaya PA  03/25/24 1304       Breonna Amaya PA  03/25/24 1304

## 2024-03-25 NOTE — H&P
Assessment:   73 yo man with acute cholecystitis  Plan:   OR today for cholecystectomy  Discussed risks and benefits with the patient and he would like to proceed        Signed By: Emily Neely MD  [facility]  [phone]    March 25, 2024          General Surgery History and Physical    Subjective:      Ulices Bosch Jr. is a 74 y.o.  male who presents with acute onset or RUQ pain.  He reports that he had a similar episode in June and was told nothing needed to be done unless he had another episode.  He had been doing well until last night when the pain returned. The pain has been sharp and constant radiating to his back and lower abdomen.     Past Medical History:   Diagnosis Date    Agatston CAC score, <100 06/10/2015    Coronary calcium score 4.    Arthritis     DDD (degenerative disc disease), thoracic     Degenerative arthritis of thumb     Left    Diverticulitis     Diverticulosis of colon 09/29/2016    of the descending colon; moderate    GERD (gastroesophageal reflux disease)     Incomplete RBBB     Numbness     Right hand    Polyp of ascending colon 09/29/2016    polyps (5mm) in the ascenidning colon and transverse colon    Pruritus ani     Sinus headache      Past Surgical History:   Procedure Laterality Date    CARDIAC PROCEDURE N/A 10/11/2023    Intracardiac echocardiogram performed by Hyacinth Cohen MD at [facility] CARDIAC CATH LAB    CARDIAC SURGERY N/A 10/15/2020    ABLATION A-FLUTTER performed by Bobby Teran MD at [facility] CARDIAC CATH LAB    COLONOSCOPY  2005    COLONOSCOPY  12/13    COMPRE ELECTROPHYSIOL XM W/LEFT ATRIAL PACNG/REC N/A 10/15/2020    Lt Atrial Pace & Record During Ep Study performed by Bobby Teran MD at [facility] CARDIAC CATH LAB    EP DEVICE PROCEDURE N/A 10/11/2023    Ablation A-fib w complete ep study performed by Hyacinth Cohen MD at [facility] CARDIAC CATH LAB    EP DEVICE PROCEDURE N/A 10/11/2023    Drug stimulation performed by Hyacinth Cohen MD at [facility] CARDIAC CATH LAB

## 2024-03-25 NOTE — ANESTHESIA PRE PROCEDURE
Department of Anesthesiology  Preprocedure Note       Name:  Ulices Bosch Jr.   Age:  74 y.o.  :  1950                                          MRN:  [id number]         Date:  3/25/2024      Surgeon: Surgeon(s):  Emily Neely MD    Procedure: Procedure(s):  CHOLECYSTECTOMY LAPAROSCOPIC ROBOTIC WITH GRAMS    Medications prior to admission:   Prior to Admission medications    Medication Sig Start Date End Date Taking? Authorizing Provider   rivaroxaban (XARELTO) 20 MG TABS tablet Take 1 tablet by mouth Daily with supper  Patient not taking: Reported on 3/25/2024 11/15/23   Hyacinth Cohen MD   FIBER PO Take 6 capsules by mouth 2 times daily    ProviderDidi MD   Multiple Vitamins-Minerals (ONE-A-DAY MENS 50+ PO) Take by mouth daily    ProviderDidi MD   aspirin 81 MG EC tablet Take 1 tablet by mouth daily    Automatic Reconciliation, Ar   atorvastatin (LIPITOR) 10 MG tablet Take 1 tablet by mouth daily    Automatic Reconciliation, Ar   lisinopril (PRINIVIL;ZESTRIL) 10 MG tablet Take 1 tablet by mouth daily 3/4/21   Automatic Reconciliation, Ar       Current medications:    Current Facility-Administered Medications   Medication Dose Route Frequency Provider Last Rate Last Admin   • lactated ringers IV soln infusion   IntraVENous Continuous Emily Neely MD 75 mL/hr at 24 1400 New Bag at 24 1400   • sodium chloride flush 0.9 % injection 5-40 mL  5-40 mL IntraVENous 2 times per day Emily Neely MD       • sodium chloride flush 0.9 % injection 5-40 mL  5-40 mL IntraVENous PRN Emily Neely MD       • 0.9 % sodium chloride infusion   IntraVENous PRN Emily Neely MD       • potassium chloride (KLOR-CON) extended release tablet 40 mEq  40 mEq Oral PRN Emily Neely MD        Or   • potassium bicarb-citric acid (EFFER-K) effervescent tablet 40 mEq  40 mEq Oral PRN Emily Neely MD        Or   • potassium chloride 10 mEq/100 mL

## 2024-03-25 NOTE — ED TRIAGE NOTES
Patient arrived ambulatory with wife via POV with c/c upper abdominal pain that started overnight. Denies n/v/d. Reports dx gall stones last year, no f/u

## 2024-03-26 LAB
ALBUMIN SERPL-MCNC: 2.8 G/DL (ref 3.5–5)
ALBUMIN/GLOB SERPL: 1 (ref 1.1–2.2)
ALP SERPL-CCNC: 76 U/L (ref 45–117)
ALT SERPL-CCNC: 110 U/L (ref 12–78)
ANION GAP SERPL CALC-SCNC: 8 MMOL/L (ref 5–15)
AST SERPL-CCNC: 69 U/L (ref 15–37)
BASOPHILS # BLD: 0 K/UL (ref 0–0.1)
BASOPHILS NFR BLD: 0 % (ref 0–1)
BILIRUB DIRECT SERPL-MCNC: 0.4 MG/DL (ref 0–0.2)
BILIRUB SERPL-MCNC: 1.4 MG/DL (ref 0.2–1)
BUN SERPL-MCNC: 23 MG/DL (ref 6–20)
BUN/CREAT SERPL: 15 (ref 12–20)
CALCIUM SERPL-MCNC: 8.1 MG/DL (ref 8.5–10.1)
CHLORIDE SERPL-SCNC: 108 MMOL/L (ref 97–108)
CO2 SERPL-SCNC: 25 MMOL/L (ref 21–32)
CREAT SERPL-MCNC: 1.51 MG/DL (ref 0.7–1.3)
DIFFERENTIAL METHOD BLD: ABNORMAL
EOSINOPHIL # BLD: 0.3 K/UL (ref 0–0.4)
EOSINOPHIL NFR BLD: 2 % (ref 0–7)
ERYTHROCYTE [DISTWIDTH] IN BLOOD BY AUTOMATED COUNT: 14 % (ref 11.5–14.5)
GLOBULIN SER CALC-MCNC: 2.9 G/DL (ref 2–4)
GLUCOSE SERPL-MCNC: 170 MG/DL (ref 65–100)
HCT VFR BLD AUTO: 41.8 % (ref 36.6–50.3)
HGB BLD-MCNC: 13.6 G/DL (ref 12.1–17)
IMM GRANULOCYTES # BLD AUTO: 0 K/UL (ref 0–0.04)
IMM GRANULOCYTES NFR BLD AUTO: 0 % (ref 0–0.5)
LYMPHOCYTES # BLD: 0.7 K/UL (ref 0.8–3.5)
LYMPHOCYTES NFR BLD: 5 % (ref 12–49)
MCH RBC QN AUTO: 27.4 PG (ref 26–34)
MCHC RBC AUTO-ENTMCNC: 32.5 G/DL (ref 30–36.5)
MCV RBC AUTO: 84.1 FL (ref 80–99)
MONOCYTES # BLD: 1.2 K/UL (ref 0–1)
MONOCYTES NFR BLD: 10 % (ref 5–13)
NEUTS SEG # BLD: 10.1 K/UL (ref 1.8–8)
NEUTS SEG NFR BLD: 82 % (ref 32–75)
NRBC # BLD: 0 K/UL (ref 0–0.01)
NRBC BLD-RTO: 0 PER 100 WBC
PLATELET # BLD AUTO: 220 K/UL (ref 150–400)
PMV BLD AUTO: 11 FL (ref 8.9–12.9)
POTASSIUM SERPL-SCNC: 4.2 MMOL/L (ref 3.5–5.1)
PROT SERPL-MCNC: 5.7 G/DL (ref 6.4–8.2)
RBC # BLD AUTO: 4.97 M/UL (ref 4.1–5.7)
SODIUM SERPL-SCNC: 141 MMOL/L (ref 136–145)
WBC # BLD AUTO: 12.3 K/UL (ref 4.1–11.1)

## 2024-03-26 PROCEDURE — 97116 GAIT TRAINING THERAPY: CPT

## 2024-03-26 PROCEDURE — 6360000002 HC RX W HCPCS: Performed by: SURGERY

## 2024-03-26 PROCEDURE — 2580000003 HC RX 258: Performed by: SURGERY

## 2024-03-26 PROCEDURE — 80076 HEPATIC FUNCTION PANEL: CPT

## 2024-03-26 PROCEDURE — 36415 COLL VENOUS BLD VENIPUNCTURE: CPT

## 2024-03-26 PROCEDURE — 80048 BASIC METABOLIC PNL TOTAL CA: CPT

## 2024-03-26 PROCEDURE — 1100000000 HC RM PRIVATE

## 2024-03-26 PROCEDURE — 85025 COMPLETE CBC W/AUTO DIFF WBC: CPT

## 2024-03-26 PROCEDURE — 2700000000 HC OXYGEN THERAPY PER DAY

## 2024-03-26 PROCEDURE — 94761 N-INVAS EAR/PLS OXIMETRY MLT: CPT

## 2024-03-26 PROCEDURE — 97161 PT EVAL LOW COMPLEX 20 MIN: CPT

## 2024-03-26 RX ORDER — HYDRALAZINE HYDROCHLORIDE 20 MG/ML
5 INJECTION INTRAMUSCULAR; INTRAVENOUS EVERY 4 HOURS PRN
Status: DISCONTINUED | OUTPATIENT
Start: 2024-03-26 | End: 2024-03-30 | Stop reason: HOSPADM

## 2024-03-26 RX ADMIN — PIPERACILLIN AND TAZOBACTAM 3375 MG: 3; .375 INJECTION, POWDER, LYOPHILIZED, FOR SOLUTION INTRAVENOUS at 11:51

## 2024-03-26 RX ADMIN — FLUCONAZOLE 200 MG: 200 INJECTION, SOLUTION INTRAVENOUS at 20:58

## 2024-03-26 RX ADMIN — MORPHINE SULFATE 2 MG: 2 INJECTION, SOLUTION INTRAMUSCULAR; INTRAVENOUS at 16:06

## 2024-03-26 RX ADMIN — HYDRALAZINE HYDROCHLORIDE 5 MG: 20 INJECTION INTRAMUSCULAR; INTRAVENOUS at 01:44

## 2024-03-26 RX ADMIN — MORPHINE SULFATE 2 MG: 2 INJECTION, SOLUTION INTRAMUSCULAR; INTRAVENOUS at 03:08

## 2024-03-26 RX ADMIN — MORPHINE SULFATE 2 MG: 2 INJECTION, SOLUTION INTRAMUSCULAR; INTRAVENOUS at 09:50

## 2024-03-26 RX ADMIN — MORPHINE SULFATE 2 MG: 2 INJECTION, SOLUTION INTRAMUSCULAR; INTRAVENOUS at 20:49

## 2024-03-26 RX ADMIN — HYDRALAZINE HYDROCHLORIDE 5 MG: 20 INJECTION INTRAMUSCULAR; INTRAVENOUS at 16:04

## 2024-03-26 RX ADMIN — MORPHINE SULFATE 2 MG: 2 INJECTION, SOLUTION INTRAMUSCULAR; INTRAVENOUS at 12:10

## 2024-03-26 RX ADMIN — SODIUM CHLORIDE, PRESERVATIVE FREE 10 ML: 5 INJECTION INTRAVENOUS at 08:41

## 2024-03-26 RX ADMIN — SODIUM CHLORIDE, PRESERVATIVE FREE 10 ML: 5 INJECTION INTRAVENOUS at 20:50

## 2024-03-26 RX ADMIN — MORPHINE SULFATE 2 MG: 2 INJECTION, SOLUTION INTRAMUSCULAR; INTRAVENOUS at 00:00

## 2024-03-26 RX ADMIN — PIPERACILLIN AND TAZOBACTAM 3375 MG: 3; .375 INJECTION, POWDER, LYOPHILIZED, FOR SOLUTION INTRAVENOUS at 00:11

## 2024-03-26 ASSESSMENT — PAIN SCALES - GENERAL
PAINLEVEL_OUTOF10: 9
PAINLEVEL_OUTOF10: 4
PAINLEVEL_OUTOF10: 6
PAINLEVEL_OUTOF10: 7
PAINLEVEL_OUTOF10: 4
PAINLEVEL_OUTOF10: 7

## 2024-03-26 ASSESSMENT — PAIN DESCRIPTION - LOCATION
LOCATION: ABDOMEN

## 2024-03-26 NOTE — BRIEF OP NOTE
Brief Postoperative Note      Patient: Ulices Bosch Jr.  YOB: 1950  MRN: [id number]    Date of Procedure: 3/25/2024    Pre-Op Diagnosis Codes:     * Cholecystitis [K81.9]    Post-Op Diagnosis: Same       Procedure(s):  DAVINCI ASSISTED LAPAROSCOPIC CHOLECYSTECTOMY, EXPLORATORY LAPAROTOMY, SMALL BOWEL RESECTION, EXTENSIVE LYSIS OF ADHESIONS    Surgeon(s):  May Raygoza MD Marinello, Melissa L, MD    Assistant:  Surgical Assistant: Dung Wahl    Anesthesia: General    Estimated Blood Loss (mL): less than 100     Complications: enterotomy    Specimens:   ID Type Source Tests Collected by Time Destination   1 : gallbladder Tissue Gallbladder SURGICAL PATHOLOGY Emily Neely MD 3/25/2024 1712    2 : Small bowel segment 1, 2, 3 Tissue Ileum SURGICAL PATHOLOGY Emily Neely MD 3/25/2024 1910        Implants:  * No implants in log *      Drains:   Closed/Suction Drain Right Abdomen;LLQ Bulb (Active)       Closed/Suction Drain Left Abdomen;LLQ Bulb (Active)       Findings: extensive adhesions throughout abdomen, acute cholecystitis      Electronically signed by Emily Neely MD on 3/25/2024 at 8:39 PM

## 2024-03-26 NOTE — PERIOP NOTE
Anesthesia informed of Pt's high BP. See flowsheet. Order received. Trandate 5 mg given IV per orders.

## 2024-03-26 NOTE — PERIOP NOTE
TRANSFER - OUT REPORT:    Verbal report given to Adelina HARGROVE  on Ulices Bosch Jr.  being transferred to [facility] for routine post-op       Report consisted of patient's Situation, Background, Assessment and   Recommendations(SBAR).     Information from the following report(s) Nurse Handoff Report, Index, Surgery Report, Intake/Output, MAR, and Cardiac Rhythm NSR PVC . PT has PPM  was reviewed with the receiving nurse.           Lines:   Peripheral IV 03/25/24 Distal;Right Cephalic (Active)   Site Assessment Clean, dry & intact 03/25/24 2240   Line Status Flushed;Normal saline locked;Capped 03/25/24 2240   Line Care Cap changed;Connections checked and tightened 03/25/24 2240   Phlebitis Assessment No symptoms 03/25/24 2240   Infiltration Assessment 0 03/25/24 2240   Alcohol Cap Used Yes 03/25/24 2240   Dressing Status Clean, dry & intact 03/25/24 2240   Dressing Type Transparent 03/25/24 2240       Peripheral IV 03/25/24 Distal;Left Forearm (Active)   Site Assessment Clean, dry & intact 03/25/24 2240   Line Status Flushed;Normal saline locked;Capped 03/25/24 2240   Line Care Cap changed;Connections checked and tightened 03/25/24 2240   Phlebitis Assessment No symptoms 03/25/24 2240   Infiltration Assessment 0 03/25/24 2240   Alcohol Cap Used Yes 03/25/24 2240   Dressing Status Clean, dry & intact 03/25/24 2240   Dressing Type Transparent 03/25/24 2240   Dressing Intervention New 03/25/24 1512        Opportunity for questions and clarification was provided.      Patient transported with:  O2 @ 4lpm and Registered Nurse

## 2024-03-26 NOTE — ANESTHESIA POSTPROCEDURE EVALUATION
Department of Anesthesiology  Postprocedure Note    Patient: Ulices Bosch Jr.  MRN: [id number]  YOB: 1950  Date of evaluation: 3/25/2024    Procedure Summary       Date: 03/25/24 Room / Location: [facility] MAIN OR  / [facility] MAIN OR    Anesthesia Start: 1527 Anesthesia Stop: 2047    Procedure: DAVINCI ASSISTED LAPAROSCOPIC CHOLECYSTECTOMY, EXPLORATORY LAPAROTOMY, SMALL BOWEL RESECTION, EXTENSIVE LYSIS OF ADHESIONS (Abdomen) Diagnosis:       Cholecystitis      (Cholecystitis [K81.9])    Surgeons: Emily Neely MD Responsible Provider: Andre Jones MD    Anesthesia Type: General ASA Status: 3            Anesthesia Type: General    Clayton Phase I: Clayton Score: 9    Clayton Phase II:      Anesthesia Post Evaluation    Patient location during evaluation: PACU  Patient participation: complete - patient participated  Level of consciousness: awake  Airway patency: patent  Nausea & Vomiting: no vomiting and no nausea  Cardiovascular status: hemodynamically stable  Respiratory status: acceptable  Hydration status: stable  Pain management: adequate    No notable events documented.

## 2024-03-26 NOTE — PROGRESS NOTES
Notified on call provider of elevated blood pressures through message system. Call returned and new order for IV hydralazine 5mg q4 prn sbp>160 added.

## 2024-03-26 NOTE — CARE COORDINATION
3/26/2024 1:33 PM       Care Management Initial Assessment  3/26/2024 1:33 PM  If patient is discharged prior to next notation, then this note serves as note for discharge by case management.    Reason for Admission:   Acute cholecystitis [K81.0]  Cholecystitis [K81.9]  Procedure(s) (LRB):  DAVINCI ASSISTED LAPAROSCOPIC CHOLECYSTECTOMY, EXPLORATORY LAPAROTOMY, SMALL BOWEL RESECTION, EXTENSIVE LYSIS OF ADHESIONS (N/A)  1 Day Post-Op    Patient Admission Status: Inpatient  RUR: No data recorded  Hospitalization in the last 30 days (Readmission):  No      Advance Directive: Full Code  Primary Healthcare Decision Maker: Legal Next of Kin     [x] No AD on file. LNOK   [] AD on file.    [] Current AD not on file. Copy requested.    [] Requests AD, and referral submitted to [facility].   __________________________________________________________________________  Assessment:      03/26/24 1331   Service Assessment   Patient Orientation Alert and Oriented   Cognition Alert   History Provided By Patient   Primary Caregiver Spouse   Support Systems Spouse/Significant Other   Patient's Healthcare Decision Maker is: Legal Next of Kin   PCP Verified by CM Yes   Last Visit to PCP Within last 3 months   Prior Functional Level Independent in ADLs/IADLs   Can patient return to prior living arrangement Yes   Ability to make needs known: Good   Family able to assist with home care needs: Yes   Would you like for me to discuss the discharge plan with any other family members/significant others, and if so, who? Yes   Financial Resources Medicare;Other (Comment)  (BCBS)   Social/Functional History   Lives With Spouse   Type of Home House   Home Layout Able to Live on Main level with bedroom/bathroom   Home Access Stairs to enter with rails   Entrance Stairs - Number of Steps 3   Home Equipment   (CPAP)   ADL Assistance Independent   Homemaking Assistance Independent   Ambulation Assistance Independent   Transfer Assistance Independent

## 2024-03-26 NOTE — PROGRESS NOTES
Spiritual Care Assessment/Progress Note  [facility]    Name: Ulices Bosch Jr. MRN: [id number]    Age: 74 y.o.     Sex: male   Language: English     Date: 3/26/2024            Total Time Calculated: 5 min              Spiritual Assessment begun in [facility] B4 MULTI-SPECIALTY ORTHOPEDICS 2  Service Provided For:: Patient and family together  Referral/Consult From:: Clergy/  Encounter Overview/Reason : Rituals, Rites and Sacraments    Spiritual beliefs:      [x] Involved in a kraol tradition/spiritual practice: Mu-ism     [] Supported by a karol community:      [] Claims no spiritual orientation:      [] Seeking spiritual identity:           [] Adheres to an individual form of spirituality:      [] Not able to assess:                Identified resources for coping and support system:   Support System: Spouse       [x] Prayer                  [] Devotional reading               [x] Music                  [] Guided Imagery     [] Pet visits                                        [] Other: (COMMENT)     Specific area/focus of visit   Encounter:    Crisis:    Spiritual/Emotional needs: Type: Spiritual Support  Ritual, Rites and Sacraments: Type: Mu-ism Communion  Grief, Loss, and Adjustments:    Ethics/Mediation:    Behavioral Health:    Palliative Care:    Advance Care Planning:      Plan/Referrals: Continue to visit, (comment)    Narrative: Mr. Bosch was in bed and drowsy from pain medication according to his wife.  Due to medical restrictions, he is unable to receive communion today. Prayer for spiritual communion offered for Mr. Bosch and prayer offered with his wife and she received communion . Let her know about the Holy Week services this week at [facility]>     Sr. KWADWO lEias, RN, ACSW, LCSW   Page:  [phone]

## 2024-03-26 NOTE — OP NOTE
[facility]          [address] [place]  [address]                            OPERATIVE REPORT      PATIENT NAME: DARI ECKERT        : 1950  MED REC NO: [id number]                       ROOM: 434  ACCOUNT NO: [id number]                       ADMIT DATE: 2024  PROVIDER: Emily Neely MD    DATE OF SERVICE:  2024    PREOPERATIVE DIAGNOSES:  Acute cholecystitis.    POSTOPERATIVE DIAGNOSES:  Acute cholecystitis.    PROCEDURES PERFORMED:       1. Da Caitlyn-assisted laparoscopic cholecystectomy.     2. Exploratory laparotomy.     3. Extensive lysis of adhesions greater than 3 hours.     4. Small bowel resection x3.    SURGEON:  MD May Hernandez MD    ASSISTANT:  Darrius    ANESTHESIA:  General endotracheal anesthesia.    ESTIMATED BLOOD LOSS:  100 ml    SPECIMENS REMOVED:  Small bowel.    INTRAOPERATIVE FINDINGS:  acute cholecystitis with thickened inflamed gallbladder  Diffuse dense adhesions throughout abdomen     COMPLICATIONS:  Enterotomy.    IMPLANTS:  none    INDICATIONS:  The patient is a 74-year-old gentleman who had presented to the emergency department with acute onset of right upper quadrant pain.  He had a similar episode back in , which resolved, and he was told to return if he had similar symptoms.  He had acute onset of right upper quadrant pain and presented to the emergency department and was found to have acute cholecystitis.  The decision was made to bring him to the operating room for cholecystectomy.    DESCRIPTION OF PROCEDURE:  After obtaining informed consent, the patient was brought to the operating room where he was laid supine on the operating table.  After induction of general endotracheal anesthesia, the patient was prepped and draped in standard fashion.  Surgical time-out was conducted.  He had been getting IV Zosyn and did not require an additional dose.  With time-out complete,  correct.    CLINICAL SERVICE:  General Surgery.    DRAINS/TUBES:  15 round MIRZA, right upper quadrant; 10 flat MIRZA, left mid abdomen.            MD DEANNA COTTRELL/NIKS  D:  03/26/2024 12:56:36  T:  03/26/2024 15:49:39  JOB #:  [id number]

## 2024-03-26 NOTE — PROGRESS NOTES
SURGERY PROGRESS NOTE      Admit Date: 3/25/2024    POD 1 Day Post-Op    Procedure: Procedure(s):  DAVINCI ASSISTED LAPAROSCOPIC CHOLECYSTECTOMY, EXPLORATORY LAPAROTOMY, SMALL BOWEL RESECTION, EXTENSIVE LYSIS OF ADHESIONS      Subjective:   Feels ok  No flatus      Objective:     BP (!) 164/84   Pulse 73   Temp 97.9 °F (36.6 °C) (Oral)   Resp 19   Ht 1.88 m (6' 2\")   Wt 104.3 kg (230 lb)   SpO2 98%   BMI 29.53 kg/m²      Temp (24hrs), Av.2 °F (36.8 °C), Min:97.5 °F (36.4 °C), Max:99.4 °F (37.4 °C)      Physical Exam:     Abdomen:  Soft.  Appropriately tender, non-distended.  Incision C/D/I. MIRZA drain serosang        Assessment:     Principal Problem:    Cholecystitis  Resolved Problems:    * No resolved hospital problems. *      Plan/Recommendations/Medical Decision Making:   Continue NG to suction today  OOB as able  D/c hayes  Increase IVF  Continue NPO

## 2024-03-26 NOTE — PLAN OF CARE
Problem: Physical Therapy - Adult  Goal: By Discharge: Performs mobility at highest level of function for planned discharge setting.  See evaluation for individualized goals.  Description: FUNCTIONAL STATUS PRIOR TO ADMISSION: Patient was independent and active without use of DME.    HOME SUPPORT PRIOR TO ADMISSION: The patient lived with wife but did not require assistance.    Physical Therapy Goals  Initiated 3/26/2024  1.  Patient will move from supine to sit and sit to supine in bed with supervision/set-up within 7 day(s).    2.  Patient will perform sit to stand with supervision/set-up within 7 day(s).  3.  Patient will transfer from bed to chair and chair to bed with modified independence using the least restrictive device within 7 day(s).  4.  Patient will ambulate with modified independence for 250 feet with the least restrictive device within 7 day(s).   5.  Patient will ascend/descend 3 stairs with 1 handrail(s) with modified independence within 7 day(s).    Outcome: Progressing   PHYSICAL THERAPY EVALUATION    Patient: Ulices Bosch  (74 y.o. male)  Date: 3/26/2024  Primary Diagnosis: Acute cholecystitis [K81.0]  Cholecystitis [K81.9]  Procedure(s) (LRB):  DAVINCI ASSISTED LAPAROSCOPIC CHOLECYSTECTOMY, EXPLORATORY LAPAROTOMY, SMALL BOWEL RESECTION, EXTENSIVE LYSIS OF ADHESIONS (N/A) 1 Day Post-Op   Precautions:                        ASSESSMENT :   DEFICITS/IMPAIRMENTS:   The patient is limited by decreased functional mobility, independence in ADLs, ROM, activity tolerance, balance, increased pain levels following DAVINCI ASSISTED LAPAROSCOPIC CHOLECYSTECTOMY, EXPLORATORY LAPAROTOMY, SMALL BOWEL RESECTION, EXTENSIVE LYSIS OF ADHESIONS POD#1. Pt received supine in bed with NGT in place. Cleared by RN to disconnect from suction for mobility assessment. Pt requires Mod A x 2 for bed mobility 2/2 increased pain and stiffness. Scoots to EOB with Min A and stands to RW(pulling up on stabilized RW)          [facility] AM-PAC®      Basic Mobility Inpatient Short Form (6-Clicks) Version 2    How much help is needed turning from your back to your side while in a flat bed without using bedrails?: A Lot  How much help is needed moving from lying on your back to sitting on the side of a flat bed without using bedrails?: A Lot  How much help is needed moving to and from a bed to a chair?: A Little  How much help is needed standing up from a chair using your arms?: A Little  How much help is needed walking in hospital room?: None  How much help is needed climbing 3-5 steps with a railing?: A Little    -[facility] Inpatient Mobility Raw Score : 17  -PAC Inpatient T-Scale Score : 42.13     Cutoff score ?171,2,3 had higher odds of discharging home with home health or need of SNF/IPR.    1. Ирина Diana, Ana Maria Valero, Valdo Garay, Mariela Posey, Robert Ruggiero, Stan Diana.  Validity of the -PAC “6-Clicks” Inpatient Daily Activity and Basic Mobility Short Forms. Physical Therapy Mar 2014, 94 (6) 379-391; DOI: 10.2522/ptj.56310708  2. Oliver VINES, Daniel J, Ele J, Jamal J. Association of AM-PAC \"6-Clicks\" Basic Mobility and Daily Activity Scores With Discharge Destination. Phys Ther. 2021 Apr 4;101(4):hsgx336. doi: 10.1093/ptj/xszr196. PMID: 04955149.  3. Kirt GONZALEZ, Daniel D, Sandi S, Morris K, Leticia S. Activity Measure for Post-Acute Care \"6-Clicks\" Basic Mobility Scores Predict Discharge Destination After Acute Care Hospitalization in Select Patient Groups: A Retrospective, Observational Study. Arch Rehabil Res Clin Transl. 2022 Jul 16;4(3):369417. doi: 10.1016/j.arrct.2022.634974. PMID: 97785557; PMCID: TPX0666619.  4. Lebron MEDRANO, Rachael S, Kristin W, Yessenia P. AM-PAC Short Forms Manual 4.0. Revised 2/2020.

## 2024-03-26 NOTE — CARE COORDINATION
3/26/2024  2:20 PM  CM met w/ pt., spouse at bedside, confirmed pt is currently insured through [employer] and Medicare will begin 4/1/24  FAITH Cannon

## 2024-03-27 LAB
ANION GAP SERPL CALC-SCNC: 5 MMOL/L (ref 5–15)
BUN SERPL-MCNC: 28 MG/DL (ref 6–20)
BUN/CREAT SERPL: 27 (ref 12–20)
CALCIUM SERPL-MCNC: 9.1 MG/DL (ref 8.5–10.1)
CHLORIDE SERPL-SCNC: 108 MMOL/L (ref 97–108)
CO2 SERPL-SCNC: 25 MMOL/L (ref 21–32)
CREAT SERPL-MCNC: 1.02 MG/DL (ref 0.7–1.3)
ERYTHROCYTE [DISTWIDTH] IN BLOOD BY AUTOMATED COUNT: 14.2 % (ref 11.5–14.5)
GLUCOSE SERPL-MCNC: 121 MG/DL (ref 65–100)
HCT VFR BLD AUTO: 36.2 % (ref 36.6–50.3)
HGB BLD-MCNC: 12 G/DL (ref 12.1–17)
MCH RBC QN AUTO: 27.1 PG (ref 26–34)
MCHC RBC AUTO-ENTMCNC: 33.1 G/DL (ref 30–36.5)
MCV RBC AUTO: 81.7 FL (ref 80–99)
NRBC # BLD: 0.02 K/UL (ref 0–0.01)
NRBC BLD-RTO: 0.2 PER 100 WBC
PLATELET # BLD AUTO: 184 K/UL (ref 150–400)
PMV BLD AUTO: 10.7 FL (ref 8.9–12.9)
POTASSIUM SERPL-SCNC: 4.1 MMOL/L (ref 3.5–5.1)
RBC # BLD AUTO: 4.43 M/UL (ref 4.1–5.7)
SODIUM SERPL-SCNC: 138 MMOL/L (ref 136–145)
WBC # BLD AUTO: 11.8 K/UL (ref 4.1–11.1)

## 2024-03-27 PROCEDURE — 94761 N-INVAS EAR/PLS OXIMETRY MLT: CPT

## 2024-03-27 PROCEDURE — 36415 COLL VENOUS BLD VENIPUNCTURE: CPT

## 2024-03-27 PROCEDURE — 1100000000 HC RM PRIVATE

## 2024-03-27 PROCEDURE — 97530 THERAPEUTIC ACTIVITIES: CPT

## 2024-03-27 PROCEDURE — 85027 COMPLETE CBC AUTOMATED: CPT

## 2024-03-27 PROCEDURE — 6370000000 HC RX 637 (ALT 250 FOR IP): Performed by: SURGERY

## 2024-03-27 PROCEDURE — 6360000002 HC RX W HCPCS: Performed by: SURGERY

## 2024-03-27 PROCEDURE — 2580000003 HC RX 258: Performed by: SURGERY

## 2024-03-27 PROCEDURE — 97116 GAIT TRAINING THERAPY: CPT

## 2024-03-27 PROCEDURE — 80048 BASIC METABOLIC PNL TOTAL CA: CPT

## 2024-03-27 RX ADMIN — PIPERACILLIN AND TAZOBACTAM 3375 MG: 3; .375 INJECTION, POWDER, LYOPHILIZED, FOR SOLUTION INTRAVENOUS at 00:11

## 2024-03-27 RX ADMIN — LISINOPRIL 10 MG: 5 TABLET ORAL at 10:33

## 2024-03-27 RX ADMIN — ACETAMINOPHEN 1000 MG: 500 TABLET ORAL at 00:05

## 2024-03-27 RX ADMIN — ACETAMINOPHEN 1000 MG: 500 TABLET ORAL at 05:51

## 2024-03-27 RX ADMIN — PIPERACILLIN AND TAZOBACTAM 3375 MG: 3; .375 INJECTION, POWDER, LYOPHILIZED, FOR SOLUTION INTRAVENOUS at 18:48

## 2024-03-27 RX ADMIN — SODIUM CHLORIDE, PRESERVATIVE FREE 10 ML: 5 INJECTION INTRAVENOUS at 10:46

## 2024-03-27 RX ADMIN — FLUCONAZOLE 200 MG: 200 INJECTION, SOLUTION INTRAVENOUS at 21:35

## 2024-03-27 RX ADMIN — PIPERACILLIN AND TAZOBACTAM 3375 MG: 3; .375 INJECTION, POWDER, LYOPHILIZED, FOR SOLUTION INTRAVENOUS at 10:41

## 2024-03-27 RX ADMIN — SODIUM CHLORIDE, PRESERVATIVE FREE 10 ML: 5 INJECTION INTRAVENOUS at 21:35

## 2024-03-27 ASSESSMENT — PAIN SCALES - GENERAL
PAINLEVEL_OUTOF10: 2
PAINLEVEL_OUTOF10: 0

## 2024-03-27 ASSESSMENT — PAIN SCALES - WONG BAKER: WONGBAKER_NUMERICALRESPONSE: HURTS A LITTLE BIT

## 2024-03-27 NOTE — PLAN OF CARE
Problem: Physical Therapy - Adult  Goal: By Discharge: Performs mobility at highest level of function for planned discharge setting.  See evaluation for individualized goals.  Description: FUNCTIONAL STATUS PRIOR TO ADMISSION: Patient was independent and active without use of DME.    HOME SUPPORT PRIOR TO ADMISSION: The patient lived with wife but did not require assistance.    Physical Therapy Goals  Initiated 3/26/2024  1.  Patient will move from supine to sit and sit to supine in bed with supervision/set-up within 7 day(s).    2.  Patient will perform sit to stand with supervision/set-up within 7 day(s).  3.  Patient will transfer from bed to chair and chair to bed with modified independence using the least restrictive device within 7 day(s).  4.  Patient will ambulate with modified independence for 250 feet with the least restrictive device within 7 day(s).   5.  Patient will ascend/descend 3 stairs with 1 handrail(s) with modified independence within 7 day(s).    Outcome: Progressing   PHYSICAL THERAPY TREATMENT    Patient: Ulices Bosch  (74 y.o. male)  Date: 3/27/2024  Diagnosis: Acute cholecystitis [K81.0]  Cholecystitis [K81.9] Cholecystitis  Procedure(s) (LRB):  DAVINCI ASSISTED LAPAROSCOPIC CHOLECYSTECTOMY, EXPLORATORY LAPAROTOMY, SMALL BOWEL RESECTION, EXTENSIVE LYSIS OF ADHESIONS (N/A) 2 Days Post-Op  Precautions:                        ASSESSMENT:  Patient continues to benefit from skilled PT services and is progressing towards goals. NGT clamped during session. Overall CGA with increased time for functional mobility, tolerated gait training with and without AD, demonstrates decreased trunk sway without device,no LOB. Fatigued with activity- encouraged OOB to chiar 3x/day         PLAN:  Patient continues to benefit from skilled intervention to address the above impairments.  Continue treatment per established plan of care.    Recommendation for discharge: (in order for the patient to meet  his/her long term goals): Therapy 2 days/week in the home    Other factors to consider for discharge: no additional factors    IF patient discharges home will need the following DME: continuing to assess with progress       SUBJECTIVE:   Patient stated, \"doing ok.\"    OBJECTIVE DATA SUMMARY:   Critical Behavior:          Functional Mobility Training:  Bed Mobility:  Bed Mobility Training  Supine to Sit: Stand-by assistance;Additional time  Scooting: Additional time;Supervision  Transfers:  Transfer Training  Overall Level of Assistance: Contact-guard assistance;Stand-by assistance;Additional time;Assist X1  Interventions: Weight shifting training/pressure relief;Safety awareness training  Balance:  Balance  Sitting: Intact  Standing: Impaired  Standing - Static: Good  Standing - Dynamic: Fair   Ambulation/Gait Training:     Gait  Distance (ft):  (175 w/ RW; 40' w/o RW)  Assistive Device: Gait belt;Walker, rolling  Interventions: Verbal cues  Base of Support: Widened  Speed/Soraida: Pace decreased (< 100 feet/min)  Gait Abnormalities: Decreased step clearance  Neuro Re-Education:                    Pain Rating:  3/10   Pain Intervention(s):   rest    Activity Tolerance:   Good    After treatment:   Patient left in no apparent distress sitting up in chair and Call bell within reach      COMMUNICATION/EDUCATION:   The patient's plan of care was discussed with: registered nurse    Patient Education  Education Given To: Patient  Education Provided: Role of Therapy;Home Exercise Program;Transfer Training  Education Method: Verbal  Barriers to Learning: None  Education Outcome: Verbalized understanding      Yanique Romano PTA  Minutes: 27

## 2024-03-27 NOTE — PLAN OF CARE
Problem: Pain  Goal: Verbalizes/displays adequate comfort level or baseline comfort level  Outcome: Progressing     Problem: Discharge Planning  Goal: Discharge to home or other facility with appropriate resources  Outcome: Progressing  Flowsheets (Taken 3/27/2024 4805)  Discharge to home or other facility with appropriate resources:   Identify barriers to discharge with patient and caregiver   Arrange for needed discharge resources and transportation as appropriate   Identify discharge learning needs (meds, wound care, etc)   Refer to discharge planning if patient needs post-hospital services based on physician order or complex needs related to functional status, cognitive ability or social support system     Problem: ABCDS Injury Assessment  Goal: Absence of physical injury  Outcome: Progressing     Problem: Safety - Adult  Goal: Free from fall injury  Outcome: Progressing     Problem: Physical Therapy - Adult  Goal: By Discharge: Performs mobility at highest level of function for planned discharge setting.  See evaluation for individualized goals.  Description: FUNCTIONAL STATUS PRIOR TO ADMISSION: Patient was independent and active without use of DME.    HOME SUPPORT PRIOR TO ADMISSION: The patient lived with wife but did not require assistance.    Physical Therapy Goals  Initiated 3/26/2024  1.  Patient will move from supine to sit and sit to supine in bed with supervision/set-up within 7 day(s).    2.  Patient will perform sit to stand with supervision/set-up within 7 day(s).  3.  Patient will transfer from bed to chair and chair to bed with modified independence using the least restrictive device within 7 day(s).  4.  Patient will ambulate with modified independence for 250 feet with the least restrictive device within 7 day(s).   5.  Patient will ascend/descend 3 stairs with 1 handrail(s) with modified independence within 7 day(s).    3/27/2024 1048 by Yanique Romano, PTA  Outcome: Progressing

## 2024-03-27 NOTE — CARE COORDINATION
3/27/2024 10:41 AM   Care Management Progress Note    Admitting diagnosis:  Acute cholecystitis [K81.0]  Cholecystitis [K81.9]    Admit Date:  3/25/2024  8:34 AM    RUR: 12%    Risk Level: [x]Low []Moderate []High    Transition of care plan:  Ongoing management by General Surgery   PT and OT consulted: Yes  Anticipated discharge plan: Home with wife, TBD on needs at this time, home health vs none, will follow for progress   Outpatient follow-up.  Discharge transport: Pt's wife

## 2024-03-27 NOTE — PROGRESS NOTES
SURGERY PROGRESS NOTE      Admit Date: 3/25/2024    POD 2 Days Post-Op    Procedure: Procedure(s):  DAVINCI ASSISTED LAPAROSCOPIC CHOLECYSTECTOMY, EXPLORATORY LAPAROTOMY, SMALL BOWEL RESECTION, EXTENSIVE LYSIS OF ADHESIONS      Subjective:   Feels well  No flatus      Objective:     BP (!) 144/75   Pulse 80   Temp 97.5 °F (36.4 °C)   Resp 16   Ht 1.88 m (6' 2\")   Wt 104.3 kg (230 lb)   SpO2 93%   BMI 29.53 kg/m²      Temp (24hrs), Av.7 °F (36.5 °C), Min:97.5 °F (36.4 °C), Max:97.9 °F (36.6 °C)      Physical Exam:     Abdomen:  Soft.  Appropriately tender, non-distended.  Incision C/D/I. MIRZA seroang        Assessment:     Principal Problem:    Cholecystitis  Resolved Problems:    * No resolved hospital problems. *      Plan/Recommendations/Medical Decision Making:   Continue NG today  NPO until return of bowel function  OOB as able

## 2024-03-27 NOTE — PLAN OF CARE
Problem: Pain  Goal: Verbalizes/displays adequate comfort level or baseline comfort level  Outcome: Progressing     Problem: Discharge Planning  Goal: Discharge to home or other facility with appropriate resources  Outcome: Progressing  Flowsheets (Taken 3/26/2024 0750)  Discharge to home or other facility with appropriate resources:   Identify barriers to discharge with patient and caregiver   Arrange for needed discharge resources and transportation as appropriate   Identify discharge learning needs (meds, wound care, etc)   Refer to discharge planning if patient needs post-hospital services based on physician order or complex needs related to functional status, cognitive ability or social support system     Problem: ABCDS Injury Assessment  Goal: Absence of physical injury  Outcome: Progressing     Problem: Safety - Adult  Goal: Free from fall injury  Outcome: Progressing     Problem: Physical Therapy - Adult  Goal: By Discharge: Performs mobility at highest level of function for planned discharge setting.  See evaluation for individualized goals.  Description: FUNCTIONAL STATUS PRIOR TO ADMISSION: Patient was independent and active without use of DME.    HOME SUPPORT PRIOR TO ADMISSION: The patient lived with wife but did not require assistance.    Physical Therapy Goals  Initiated 3/26/2024  1.  Patient will move from supine to sit and sit to supine in bed with supervision/set-up within 7 day(s).    2.  Patient will perform sit to stand with supervision/set-up within 7 day(s).  3.  Patient will transfer from bed to chair and chair to bed with modified independence using the least restrictive device within 7 day(s).  4.  Patient will ambulate with modified independence for 250 feet with the least restrictive device within 7 day(s).   5.  Patient will ascend/descend 3 stairs with 1 handrail(s) with modified independence within 7 day(s).    3/26/2024 1616 by Luz Burnham, PT  Outcome: Progressing

## 2024-03-28 LAB
ALBUMIN SERPL-MCNC: 2.4 G/DL (ref 3.5–5)
ALBUMIN/GLOB SERPL: 0.7 (ref 1.1–2.2)
ALP SERPL-CCNC: 73 U/L (ref 45–117)
ALT SERPL-CCNC: 73 U/L (ref 12–78)
ANION GAP SERPL CALC-SCNC: 3 MMOL/L (ref 5–15)
AST SERPL-CCNC: 34 U/L (ref 15–37)
BILIRUB DIRECT SERPL-MCNC: 0.3 MG/DL (ref 0–0.2)
BILIRUB SERPL-MCNC: 1.3 MG/DL (ref 0.2–1)
BUN SERPL-MCNC: 30 MG/DL (ref 6–20)
BUN/CREAT SERPL: 29 (ref 12–20)
CALCIUM SERPL-MCNC: 9.4 MG/DL (ref 8.5–10.1)
CHLORIDE SERPL-SCNC: 106 MMOL/L (ref 97–108)
CO2 SERPL-SCNC: 31 MMOL/L (ref 21–32)
CREAT SERPL-MCNC: 1.05 MG/DL (ref 0.7–1.3)
ERYTHROCYTE [DISTWIDTH] IN BLOOD BY AUTOMATED COUNT: 14.1 % (ref 11.5–14.5)
GLOBULIN SER CALC-MCNC: 3.6 G/DL (ref 2–4)
GLUCOSE SERPL-MCNC: 115 MG/DL (ref 65–100)
HCT VFR BLD AUTO: 34.9 % (ref 36.6–50.3)
HGB BLD-MCNC: 11.6 G/DL (ref 12.1–17)
MCH RBC QN AUTO: 27.6 PG (ref 26–34)
MCHC RBC AUTO-ENTMCNC: 33.2 G/DL (ref 30–36.5)
MCV RBC AUTO: 82.9 FL (ref 80–99)
NRBC # BLD: 0 K/UL (ref 0–0.01)
NRBC BLD-RTO: 0 PER 100 WBC
PLATELET # BLD AUTO: 245 K/UL (ref 150–400)
PMV BLD AUTO: 11.1 FL (ref 8.9–12.9)
POTASSIUM SERPL-SCNC: 3.6 MMOL/L (ref 3.5–5.1)
PROT SERPL-MCNC: 6 G/DL (ref 6.4–8.2)
RBC # BLD AUTO: 4.21 M/UL (ref 4.1–5.7)
SODIUM SERPL-SCNC: 140 MMOL/L (ref 136–145)
WBC # BLD AUTO: 12.2 K/UL (ref 4.1–11.1)

## 2024-03-28 PROCEDURE — 6360000002 HC RX W HCPCS: Performed by: SURGERY

## 2024-03-28 PROCEDURE — 80048 BASIC METABOLIC PNL TOTAL CA: CPT

## 2024-03-28 PROCEDURE — 80076 HEPATIC FUNCTION PANEL: CPT

## 2024-03-28 PROCEDURE — 85027 COMPLETE CBC AUTOMATED: CPT

## 2024-03-28 PROCEDURE — 6370000000 HC RX 637 (ALT 250 FOR IP): Performed by: SURGERY

## 2024-03-28 PROCEDURE — 2580000003 HC RX 258: Performed by: SURGERY

## 2024-03-28 PROCEDURE — 1100000000 HC RM PRIVATE

## 2024-03-28 PROCEDURE — 36415 COLL VENOUS BLD VENIPUNCTURE: CPT

## 2024-03-28 RX ADMIN — SODIUM CHLORIDE, POTASSIUM CHLORIDE, SODIUM LACTATE AND CALCIUM CHLORIDE: 600; 310; 30; 20 INJECTION, SOLUTION INTRAVENOUS at 02:43

## 2024-03-28 RX ADMIN — FLUCONAZOLE 200 MG: 200 INJECTION, SOLUTION INTRAVENOUS at 21:13

## 2024-03-28 RX ADMIN — SODIUM CHLORIDE, PRESERVATIVE FREE 10 ML: 5 INJECTION INTRAVENOUS at 09:35

## 2024-03-28 RX ADMIN — LISINOPRIL 10 MG: 5 TABLET ORAL at 09:29

## 2024-03-28 RX ADMIN — SODIUM CHLORIDE, PRESERVATIVE FREE 10 ML: 5 INJECTION INTRAVENOUS at 22:13

## 2024-03-28 RX ADMIN — PIPERACILLIN AND TAZOBACTAM 3375 MG: 3; .375 INJECTION, POWDER, LYOPHILIZED, FOR SOLUTION INTRAVENOUS at 02:46

## 2024-03-28 RX ADMIN — SODIUM CHLORIDE, POTASSIUM CHLORIDE, SODIUM LACTATE AND CALCIUM CHLORIDE: 600; 310; 30; 20 INJECTION, SOLUTION INTRAVENOUS at 11:37

## 2024-03-28 RX ADMIN — PIPERACILLIN AND TAZOBACTAM 3375 MG: 3; .375 INJECTION, POWDER, LYOPHILIZED, FOR SOLUTION INTRAVENOUS at 11:30

## 2024-03-28 RX ADMIN — PIPERACILLIN AND TAZOBACTAM 3375 MG: 3; .375 INJECTION, POWDER, LYOPHILIZED, FOR SOLUTION INTRAVENOUS at 22:11

## 2024-03-28 ASSESSMENT — PAIN SCALES - GENERAL
PAINLEVEL_OUTOF10: 0
PAINLEVEL_OUTOF10: 0

## 2024-03-28 NOTE — PLAN OF CARE
Problem: Pain  Goal: Verbalizes/displays adequate comfort level or baseline comfort level  3/28/2024 0004 by Maggie Akbar RN  Outcome: Progressing  3/27/2024 1748 by James Hanson LPN  Outcome: Progressing     Problem: Discharge Planning  Goal: Discharge to home or other facility with appropriate resources  3/28/2024 0004 by Maggie Akbar RN  Outcome: Progressing  3/27/2024 1748 by James Hanson LPN  Outcome: Progressing  Flowsheets (Taken 3/27/2024 0746)  Discharge to home or other facility with appropriate resources:   Identify barriers to discharge with patient and caregiver   Arrange for needed discharge resources and transportation as appropriate   Identify discharge learning needs (meds, wound care, etc)   Refer to discharge planning if patient needs post-hospital services based on physician order or complex needs related to functional status, cognitive ability or social support system     Problem: ABCDS Injury Assessment  Goal: Absence of physical injury  3/28/2024 0004 by Maggie Akbar RN  Outcome: Progressing  3/27/2024 1748 by James Hanson LPN  Outcome: Progressing     Problem: Safety - Adult  Goal: Free from fall injury  3/28/2024 0004 by Maggie Akbar RN  Outcome: Progressing  3/27/2024 1748 by James Hanson LPN  Outcome: Progressing     Problem: Physical Therapy - Adult  Goal: By Discharge: Performs mobility at highest level of function for planned discharge setting.  See evaluation for individualized goals.  Description: FUNCTIONAL STATUS PRIOR TO ADMISSION: Patient was independent and active without use of DME.    HOME SUPPORT PRIOR TO ADMISSION: The patient lived with wife but did not require assistance.    Physical Therapy Goals  Initiated 3/26/2024  1.  Patient will move from supine to sit and sit to supine in bed with supervision/set-up within 7 day(s).    2.  Patient will perform sit to stand with supervision/set-up within 7 day(s).  3.  Patient

## 2024-03-28 NOTE — PROGRESS NOTES
1417 Nurse reconnected patient NG tube to low intermitted suction. Will reassess to evaluate output. Patient did not complain of pain or nausea.      1510 Reassessed patient NG tube, noted 100mL. Order to remove NG tube if less than 200mL.     1518 Nurse removed NG tube with the assistance of charge nurse. Patient tolerated well.

## 2024-03-28 NOTE — PROGRESS NOTES
[facility].  Due to medical restrictions and staff attending to Mr. Bosch, prayer for spiritual communion offered outside his room.     Sr. KWADWO Elias, RN, ACSW, LCSW   Page:  [phone]

## 2024-03-28 NOTE — CARE COORDINATION
3/28/2024 1:08 PM   Care Management Progress Note     Admitting diagnosis:  Acute cholecystitis [K81.0]  Cholecystitis [K81.9]     Admit Date:  3/25/2024  8:34 AM     RUR: 12%    Risk Level: [x]Low []Moderate []High     Transition of care plan:  Ongoing management by General Surgery, NG tube clamping trial today   PT and OT consulted: Yes  Anticipated discharge plan: Home with wife, TBD on needs at this time, home health vs none, will follow for progress   Outpatient follow-up.  Discharge transport: Pt's wife

## 2024-03-28 NOTE — PROGRESS NOTES
SURGERY PROGRESS NOTE      Admit Date: 3/25/2024    POD 3 Days Post-Op    Procedure: Procedure(s):  DAVINCI ASSISTED LAPAROSCOPIC CHOLECYSTECTOMY, EXPLORATORY LAPAROTOMY, SMALL BOWEL RESECTION, EXTENSIVE LYSIS OF ADHESIONS      Subjective:   Feels better  Denies flatus      Objective:     /66   Pulse 78   Temp 98.2 °F (36.8 °C) (Oral)   Resp 20   Ht 1.88 m (6' 2\")   Wt 104.3 kg (230 lb)   SpO2 91%   BMI 29.53 kg/m²      Temp (24hrs), Av °F (36.7 °C), Min:97.7 °F (36.5 °C), Max:98.4 °F (36.9 °C)      Physical Exam:     Abdomen:  Soft.  Non-tender, mildly distended.  Incision C/D/I. MIRZA serosang        Assessment:     Principal Problem:    Cholecystitis  Resolved Problems:    * No resolved hospital problems. *      Plan/Recommendations/Medical Decision Making:   Clamp trial today  Go slow with clears if ng able to be removed  OOB as able  Continue IVF until taking po

## 2024-03-28 NOTE — PLAN OF CARE
Problem: Pain  Goal: Verbalizes/displays adequate comfort level or baseline comfort level  3/28/2024 1127 by Jennifer Cotter RN  Outcome: /[facility]SP Progressing  3/28/2024 0004 by Maggie Akbar RN  Outcome: Progressing     Problem: Discharge Planning  Goal: Discharge to home or other facility with appropriate resources  3/28/2024 1127 by Jennifer Cotter RN  Outcome: /HSP Progressing  3/28/2024 0004 by Maggie Akbar RN  Outcome: Progressing     Problem: ABCDS Injury Assessment  Goal: Absence of physical injury  3/28/2024 1127 by Jennifer Cotter RN  Outcome: /[facility]SPC Progressing  3/28/2024 0004 by Maggie Akbar RN  Outcome: Progressing     Problem: Safety - Adult  Goal: Free from fall injury  3/28/2024 1127 by Jennifer Cotter RN  Outcome: /HSP Progressing  3/28/2024 0004 by Maggie Akbar RN  Outcome: Progressing     Problem: Skin/Tissue Integrity  Goal: Absence of new skin breakdown  Description: 1.  Monitor for areas of redness and/or skin breakdown  2.  Assess vascular access sites hourly  3.  Every 4-6 hours minimum:  Change oxygen saturation probe site  4.  Every 4-6 hours:  If on nasal continuous positive airway pressure, respiratory therapy assess nares and determine need for appliance change or resting period.  3/28/2024 1127 by Jennifer Cotter RN  Outcome: /HSPC Progressing  3/28/2024 0004 by Maggie Akbar, RN  Outcome: Progressing

## 2024-03-29 PROCEDURE — 6370000000 HC RX 637 (ALT 250 FOR IP): Performed by: SURGERY

## 2024-03-29 PROCEDURE — 97116 GAIT TRAINING THERAPY: CPT

## 2024-03-29 PROCEDURE — 1100000000 HC RM PRIVATE

## 2024-03-29 PROCEDURE — 94761 N-INVAS EAR/PLS OXIMETRY MLT: CPT

## 2024-03-29 PROCEDURE — 97530 THERAPEUTIC ACTIVITIES: CPT

## 2024-03-29 PROCEDURE — 6360000002 HC RX W HCPCS: Performed by: SURGERY

## 2024-03-29 PROCEDURE — 2580000003 HC RX 258: Performed by: SURGERY

## 2024-03-29 RX ORDER — OXYCODONE HYDROCHLORIDE 5 MG/1
5 TABLET ORAL EVERY 4 HOURS PRN
Status: DISCONTINUED | OUTPATIENT
Start: 2024-03-29 | End: 2024-03-30 | Stop reason: HOSPADM

## 2024-03-29 RX ORDER — DOCUSATE SODIUM 100 MG/1
100 CAPSULE, LIQUID FILLED ORAL 2 TIMES DAILY
Status: DISCONTINUED | OUTPATIENT
Start: 2024-03-29 | End: 2024-03-30 | Stop reason: HOSPADM

## 2024-03-29 RX ADMIN — PIPERACILLIN AND TAZOBACTAM 3375 MG: 3; .375 INJECTION, POWDER, LYOPHILIZED, FOR SOLUTION INTRAVENOUS at 14:04

## 2024-03-29 RX ADMIN — DOCUSATE SODIUM 100 MG: 100 CAPSULE, LIQUID FILLED ORAL at 19:39

## 2024-03-29 RX ADMIN — DOCUSATE SODIUM 100 MG: 100 CAPSULE, LIQUID FILLED ORAL at 11:32

## 2024-03-29 RX ADMIN — PIPERACILLIN AND TAZOBACTAM 3375 MG: 3; .375 INJECTION, POWDER, LYOPHILIZED, FOR SOLUTION INTRAVENOUS at 05:21

## 2024-03-29 RX ADMIN — LISINOPRIL 10 MG: 5 TABLET ORAL at 09:17

## 2024-03-29 RX ADMIN — SODIUM CHLORIDE, POTASSIUM CHLORIDE, SODIUM LACTATE AND CALCIUM CHLORIDE: 600; 310; 30; 20 INJECTION, SOLUTION INTRAVENOUS at 02:09

## 2024-03-29 RX ADMIN — SODIUM CHLORIDE, PRESERVATIVE FREE 10 ML: 5 INJECTION INTRAVENOUS at 09:17

## 2024-03-29 RX ADMIN — PIPERACILLIN AND TAZOBACTAM 3375 MG: 3; .375 INJECTION, POWDER, LYOPHILIZED, FOR SOLUTION INTRAVENOUS at 22:05

## 2024-03-29 RX ADMIN — FLUCONAZOLE 200 MG: 200 INJECTION, SOLUTION INTRAVENOUS at 19:44

## 2024-03-29 RX ADMIN — SODIUM CHLORIDE, POTASSIUM CHLORIDE, SODIUM LACTATE AND CALCIUM CHLORIDE: 600; 310; 30; 20 INJECTION, SOLUTION INTRAVENOUS at 10:26

## 2024-03-29 RX ADMIN — SODIUM CHLORIDE, PRESERVATIVE FREE 10 ML: 5 INJECTION INTRAVENOUS at 19:40

## 2024-03-29 NOTE — PLAN OF CARE
Problem: Physical Therapy - Adult  Goal: By Discharge: Performs mobility at highest level of function for planned discharge setting.  See evaluation for individualized goals.  Description: FUNCTIONAL STATUS PRIOR TO ADMISSION: Patient was independent and active without use of DME.    HOME SUPPORT PRIOR TO ADMISSION: The patient lived with wife but did not require assistance.    Physical Therapy Goals  Initiated 3/26/2024  1.  Patient will move from supine to sit and sit to supine in bed with supervision/set-up within 7 day(s).    2.  Patient will perform sit to stand with supervision/set-up within 7 day(s).  3.  Patient will transfer from bed to chair and chair to bed with modified independence using the least restrictive device within 7 day(s).  4.  Patient will ambulate with modified independence for 250 feet with the least restrictive device within 7 day(s).   5.  Patient will ascend/descend 3 stairs with 1 handrail(s) with modified independence within 7 day(s).    Outcome: Progressing   PHYSICAL THERAPY TREATMENT    Patient: Ulices Bosch  (74 y.o. male)  Date: 3/29/2024  Diagnosis: Acute cholecystitis [K81.0]  Cholecystitis [K81.9] Cholecystitis  Procedure(s) (LRB):  DAVINCI ASSISTED LAPAROSCOPIC CHOLECYSTECTOMY, EXPLORATORY LAPAROTOMY, SMALL BOWEL RESECTION, EXTENSIVE LYSIS OF ADHESIONS (N/A) 4 Days Post-Op  Precautions:                        ASSESSMENT:  Patient continues to benefit from skilled PT services and is progressing towards goals. Pt requires increased time for bed mobility with use of bedrails and HOB elevated 2/2 abdominal pain with supine<>sitting. SBA with OOB activity with use of RW, declined use of RW for home use. Pt is safe to  walk in hallway with family         PLAN:  Patient continues to benefit from skilled intervention to address the above impairments.  Continue treatment per established plan of care.    Recommendation for discharge: (in order for the patient to meet

## 2024-03-29 NOTE — PROGRESS NOTES
SURGERY PROGRESS NOTE      Admit Date: 3/25/2024    POD 4 Days Post-Op    Procedure: Procedure(s):  DAVINCI ASSISTED LAPAROSCOPIC CHOLECYSTECTOMY, EXPLORATORY LAPAROTOMY, SMALL BOWEL RESECTION, EXTENSIVE LYSIS OF ADHESIONS      Subjective:   Flatus, hungry.  Has been out of bed to bathroom. Making great urine.       Objective:     BP (!) 140/72   Pulse 70   Temp 98.2 °F (36.8 °C) (Oral)   Resp 16   Ht 1.88 m (6' 2\")   Wt 104.3 kg (230 lb)   SpO2 95%   BMI 29.53 kg/m²      Temp (24hrs), Av.3 °F (36.8 °C), Min:98.1 °F (36.7 °C), Max:98.6 °F (37 °C)      Physical Exam:     Abdomen:  Soft.  Non-tender, mildly distended.  Incision C/D/I. Surgical staples intact.  MIRZA serosang        Assessment:     Principal Problem:    Cholecystitis  Resolved Problems:    * No resolved hospital problems. *      Plan/Recommendations/Medical Decision Making:   Advance diet as tolerated.  Mobilize.  Will start removing drains once eating  D/c IVF.     Errol Hogue MD

## 2024-03-29 NOTE — CARE COORDINATION
3/29/2024 12:05 PM   Care Management Progress Note     Admitting diagnosis:  Acute cholecystitis [K81.0]  Cholecystitis [K81.9]     Admit Date:  3/25/2024  8:34 AM     RUR: 12%    Risk Level: [x]Low []Moderate []High     Transition of care plan:  Ongoing management by General Surgery on regular diet   PT and OT consulted: Yes  Anticipated discharge plan: Home with wife, TBD on needs at this time, home health vs none, will follow for progress   Outpatient follow-up.  Discharge transport: Pt's wife

## 2024-03-29 NOTE — PLAN OF CARE
Problem: Pain  Goal: Verbalizes/displays adequate comfort level or baseline comfort level  3/29/2024 0958 by Jennifer Cotter RN  Outcome: /[facility] Progressing  3/29/2024 0033 by Letitia Vital RN  Outcome: Progressing     Problem: Discharge Planning  Goal: Discharge to home or other facility with appropriate resources  3/29/2024 0958 by Jennifer Cotter RN  Outcome: /[facility] Progressing  3/29/2024 0033 by Letitia Vital RN  Outcome: Progressing  Flowsheets (Taken 3/28/2024 2025)  Discharge to home or other facility with appropriate resources: Identify barriers to discharge with patient and caregiver     Problem: ABCDS Injury Assessment  Goal: Absence of physical injury  3/29/2024 0958 by Jennifer Cotter RN  Outcome: /[facility] Progressing  3/29/2024 0033 by Letitia Vital RN  Outcome: Progressing     Problem: Safety - Adult  Goal: Free from fall injury  3/29/2024 0958 by Jennifer Cotter RN  Outcome: /[facility] Progressing  3/29/2024 0033 by Letitia Vital RN  Outcome: Progressing     Problem: Skin/Tissue Integrity  Goal: Absence of new skin breakdown  Description: 1.  Monitor for areas of redness and/or skin breakdown  2.  Assess vascular access sites hourly  3.  Every 4-6 hours minimum:  Change oxygen saturation probe site  4.  Every 4-6 hours:  If on nasal continuous positive airway pressure, respiratory therapy assess nares and determine need for appliance change or resting period.  3/29/2024 0958 by Jennifer Cotter RN  Outcome: /[facility] Progressing  3/29/2024 0033 by Letitia Vital RN  Outcome: Progressing

## 2024-03-29 NOTE — PLAN OF CARE
Problem: Pain  Goal: Verbalizes/displays adequate comfort level or baseline comfort level  3/29/2024 0033 by Letitia Vital, RN  Outcome: Progressing     Problem: Discharge Planning  Goal: Discharge to home or other facility with appropriate resources  3/29/2024 0033 by Letitia Vital, RN  Outcome: Progressing  Flowsheets (Taken 3/28/2024 2025)  Discharge to home or other facility with appropriate resources: Identify barriers to discharge with patient and caregiver     Problem: ABCDS Injury Assessment  Goal: Absence of physical injury  3/29/2024 0033 by Letitia Vital, RN  Outcome: Progressing     Problem: Safety - Adult  Goal: Free from fall injury  3/29/2024 0033 by Letitia Vital, RN  Outcome: Progressing     Problem: Skin/Tissue Integrity  Goal: Absence of new skin breakdown  Description: 1.  Monitor for areas of redness and/or skin breakdown  2.  Assess vascular access sites hourly  3.  Every 4-6 hours minimum:  Change oxygen saturation probe site  4.  Every 4-6 hours:  If on nasal continuous positive airway pressure, respiratory therapy assess nares and determine need for appliance change or resting period.  3/29/2024 0033 by Letitia Vital, RN  Outcome: Progressing

## 2024-03-30 VITALS
HEIGHT: 74 IN | DIASTOLIC BLOOD PRESSURE: 64 MMHG | OXYGEN SATURATION: 97 % | HEART RATE: 61 BPM | TEMPERATURE: 98.1 F | SYSTOLIC BLOOD PRESSURE: 136 MMHG | BODY MASS INDEX: 29.52 KG/M2 | WEIGHT: 230 LBS | RESPIRATION RATE: 16 BRPM

## 2024-03-30 PROCEDURE — 6370000000 HC RX 637 (ALT 250 FOR IP): Performed by: SURGERY

## 2024-03-30 PROCEDURE — 2580000003 HC RX 258: Performed by: SURGERY

## 2024-03-30 PROCEDURE — 6360000002 HC RX W HCPCS: Performed by: SURGERY

## 2024-03-30 RX ORDER — DOCUSATE SODIUM 100 MG/1
100 CAPSULE, LIQUID FILLED ORAL 2 TIMES DAILY
Qty: 60 CAPSULE | Refills: 0 | Status: SHIPPED | OUTPATIENT
Start: 2024-03-30 | End: 2024-04-29

## 2024-03-30 RX ORDER — HYDROCODONE BITARTRATE AND ACETAMINOPHEN 5; 325 MG/1; MG/1
1 TABLET ORAL EVERY 6 HOURS PRN
Qty: 10 TABLET | Refills: 0 | Status: SHIPPED | OUTPATIENT
Start: 2024-03-30 | End: 2024-04-02

## 2024-03-30 RX ADMIN — HYDRALAZINE HYDROCHLORIDE 5 MG: 20 INJECTION INTRAMUSCULAR; INTRAVENOUS at 03:45

## 2024-03-30 RX ADMIN — SODIUM CHLORIDE, PRESERVATIVE FREE 10 ML: 5 INJECTION INTRAVENOUS at 08:07

## 2024-03-30 RX ADMIN — LISINOPRIL 10 MG: 5 TABLET ORAL at 08:07

## 2024-03-30 RX ADMIN — PIPERACILLIN AND TAZOBACTAM 3375 MG: 3; .375 INJECTION, POWDER, LYOPHILIZED, FOR SOLUTION INTRAVENOUS at 04:42

## 2024-03-30 RX ADMIN — DOCUSATE SODIUM 100 MG: 100 CAPSULE, LIQUID FILLED ORAL at 08:07

## 2024-03-30 NOTE — PROGRESS NOTES
Nurse handed patient a copy of discharge instructions which have been read and explained to patient. New medications were read and explained to patient, patient verbalized understanding. Patient aware that prescriptions have been electronically sent to their pharmacy. Opportunity for questions and clarification offered. Removed patient's IV access with no complications. Vital signs stable. Patient sent with all belongings.

## 2024-03-30 NOTE — PLAN OF CARE
Problem: Pain  Goal: Verbalizes/displays adequate comfort level or baseline comfort level  3/29/2024 2150 by Letitia Vital, RN  Outcome: Progressing     Problem: Safety - Adult  Goal: Free from fall injury  3/29/2024 2150 by Letitia Vital, RN  Outcome: Progressing     Problem: Skin/Tissue Integrity  Goal: Absence of new skin breakdown  Description: 1.  Monitor for areas of redness and/or skin breakdown  2.  Assess vascular access sites hourly  3.  Every 4-6 hours minimum:  Change oxygen saturation probe site  4.  Every 4-6 hours:  If on nasal continuous positive airway pressure, respiratory therapy assess nares and determine need for appliance change or resting period.  3/29/2024 2150 by Letitia Vital, RN  Outcome: Progressing

## 2024-03-30 NOTE — DISCHARGE SUMMARY
Discharge Summary    Patient: Ulices Bosch Jr.               Sex: male          DOA: 3/25/2024  8:34 AM       YOB: 1950      Age:  74 y.o.        LOS:  LOS: 5 days                Discharge Date:      Admission Diagnoses: Acute cholecystitis [K81.0]  Cholecystitis [K81.9]    Discharge Diagnoses:  Same, extensive adhesive disease    Procedure:  Procedure(s):  DAVINCI ASSISTED LAPAROSCOPIC CHOLECYSTECTOMY, EXPLORATORY LAPAROTOMY, SMALL BOWEL RESECTION, EXTENSIVE LYSIS OF ADHESIONS    Discharge Condition: Good    Hospital Course: Cholecystectomy complicated by extensive adhesive disease and friable tissue, requiring bowel resection.  Postoperative course unremarkable.  Drains removed on discharge today.    Discharge to home in stable condition.      Consults: None    Significant Diagnostic Studies: See full electronic record.     Discharge Medications:     Current Discharge Medication List        START taking these medications    Details   HYDROcodone-acetaminophen (NORCO) 5-325 MG per tablet Take 1 tablet by mouth every 6 hours as needed for Pain for up to 3 days. Intended supply: 3 days. Take lowest dose possible to manage pain Max Daily Amount: 4 tablets  Qty: 10 tablet, Refills: 0    Comments: Reduce doses taken as pain becomes manageable  Associated Diagnoses: Cholecystitis      docusate sodium (COLACE) 100 MG capsule Take 1 capsule by mouth 2 times daily  Qty: 60 capsule, Refills: 0           CONTINUE these medications which have NOT CHANGED    Details   Multiple Vitamins-Minerals (ONE-A-DAY MENS 50+ PO) Take by mouth daily      aspirin 81 MG EC tablet Take 1 tablet by mouth daily      atorvastatin (LIPITOR) 10 MG tablet Take 1 tablet by mouth daily      lisinopril (PRINIVIL;ZESTRIL) 10 MG tablet Take 1 tablet by mouth daily           STOP taking these medications       rivaroxaban (XARELTO) 20 MG TABS tablet Comments:   Reason for Stopping:         FIBER PO Comments:   Reason for  Stopping:               Activity/Diet/Wound Care: See patient administered discharge instructions.    Follow-up: 2 weeks in office.     Errol Hogue MD  [facility]  Office:  [phone]  Fax:  [phone]

## 2024-03-30 NOTE — DISCHARGE INSTRUCTIONS
Low-Fiber Diet: Care Instructions  Overview (2 weeks on discharge)     When your bowels are irritated, you may need to limit fiber in your diet until the problem clears up. Your doctor and dietitian can help you design a low-fiber diet based on your health and what you prefer to eat. Ask your doctor how long you should stay on a low-fiber diet. Your doctor probably will have you start adding more fiber to your diet as you get better. Always talk with your doctor or dietitian before you make changes in your diet.  Follow-up care is a key part of your treatment and safety. Be sure to make and go to all appointments, and call your doctor if you are having problems. It's also a good idea to know your test results and keep a list of the medicines you take.  How can you care for yourself at home?  Choose white or refined grains, and avoid whole grains. That means eating white or refined cereals, breads, crackers, rice, or pasta.  Choose fruits and vegetables that have been peeled and cooked. Avoid fruits and vegetables that are raw or that have skins, seeds, or hulls.  Eat cooked or canned fruit. Low-fiber fruits include applesauce, canned peaches, canned pears, and fruit juice without pulp.  Eat low-fiber vegetables, which include well-cooked vegetables and vegetable juice.  Drink or eat milk, yogurt, or other milk products if you can digest dairy without too many problems. Your doctor may limit milk and milk products for a while. If so, they may recommend a calcium and vitamin D supplement.  Eat well-cooked meat, such as chicken, turkey, fish, or lean meat. You also can eat eggs and tofu.  Avoid these foods:  Bran, brown or wild rice, oatmeal, granola, corn, david crackers, barley, and whole wheat and other whole-grain breads, such as rye bread  Cereals with more than 2 grams of fiber a serving  Berries, rhubarb, prunes, prune juice, and all dried fruits  Raw vegetables and fruits  Foods that may cause gas, such  as raw or cooked cabbage, broccoli, brussels sprouts, and cauliflower  Cooked dried beans, lentils, and split peas  Crunchy peanut butter  Ice cream with fruit pieces in it  Coconut, nuts, popcorn, raisins, and seeds, or any ice cream, yogurt, or cheese with these in them  Where can you learn more?  Go to https://www.[employer].net/patientEd and enter E763 to learn more about \"Low-Fiber Diet: Care Instructions.\"  Current as of: September 20, 2023               Content Version: 14.0  © 8986-2190 [employer].   Care instructions adapted under license by [employer]. If you have questions about a medical condition or this instruction, always ask your healthcare professional. [employer] disclaims any warranty or liability for your use of this information.

## 2024-03-30 NOTE — PLAN OF CARE
Problem: Pain  Goal: Verbalizes/displays adequate comfort level or baseline comfort level  3/30/2024 1118 by Obed Ray RN  Outcome: Progressing  3/29/2024 2150 by Letitia Vital RN  Outcome: Progressing     Problem: Discharge Planning  Goal: Discharge to home or other facility with appropriate resources  Outcome: Progressing     Problem: ABCDS Injury Assessment  Goal: Absence of physical injury  Outcome: Progressing     Problem: Safety - Adult  Goal: Free from fall injury  3/30/2024 1118 by Obed Ray RN  Outcome: Progressing  3/29/2024 2150 by Letitia Vital RN  Outcome: Progressing     Problem: Skin/Tissue Integrity  Goal: Absence of new skin breakdown  Description: 1.  Monitor for areas of redness and/or skin breakdown  2.  Assess vascular access sites hourly  3.  Every 4-6 hours minimum:  Change oxygen saturation probe site  4.  Every 4-6 hours:  If on nasal continuous positive airway pressure, respiratory therapy assess nares and determine need for appliance change or resting period.  3/30/2024 1118 by Obed Ray RN  Outcome: Progressing  3/29/2024 2150 by Letitia Vital RN  Outcome: Progressing

## 2024-04-02 NOTE — PROGRESS NOTES
Physician Progress Note      PATIENT:               DARI ECKERT  CSN #:                  [id number]  :                       1950  ADMIT DATE:       3/25/2024 8:34 AM  DISCH DATE:        3/30/2024 12:17 PM  RESPONDING  PROVIDER #:        Errol Hogue MD          QUERY TEXT:    Good Morning    This patient admitted on 2024- present for Acute Cholecystitis s/p Da   Caitlyn-assisted Lap cholecystectomy and Exp. Lap with Small bowel resection x3    The patient noted with creatinine @ 1.06 and post op day 1 @ 1.51, and now   down to 1.05    If possible, please document in the progress notes and discharge summary if   you are evaluating and/or treating any of the following:    The medical record reflects the following:  Risk Factors: Acute cholecystitis, Post op lap donnie and Small bowel   resection.  Clinical Indicators: Admitted for acute cholecystitis, Creatinine @ 1.06 on   admission and up to 1.51 the next day, today is @ 1.06  Treatment: Received 1000cc NS bolus, Then LR @ 125cc/hr--Daily chemistry,   Monitor I&O    Thank you  Alona Mckeon, BSN, RN, CPHQ, CCDS, SMART  __________________________________________________    Defined by Kidney Disease Improving Global Outcomes (KDIGO) clinical practice   guideline for acute kidney injury:  -Increase in SCr by greater than or equal to 0.3 mg/dl within 48 hours; or  -Increase or decrease in SCr to greater than or equal to 1.5 times baseline,   which is known or presumed to have occurred within the prior 7 days; or  -Urine volume < 0.5ml/kg/h for 6 hours  Options provided:  -- Acute kidney injury  -- Other - I will add my own diagnosis  -- Disagree - Not applicable / Not valid  -- Disagree - Clinically unable to determine / Unknown  -- Refer to Clinical Documentation Reviewer    PROVIDER RESPONSE TEXT:    This patient has an Acute kidney injury.    Query created by: Alona Mckeon on 3/29/2024 9:25 AM      Electronically signed by:  Errol Hogue MD

## 2024-05-21 ENCOUNTER — TELEPHONE (OUTPATIENT)
Age: 74
End: 2024-05-21

## 2024-05-21 NOTE — TELEPHONE ENCOUNTER
LVM for pt. To call and R/S his pacemaker appt. And to see an EP NP.    ** Please R/S for the Pt.       Thank You!

## 2024-05-30 ENCOUNTER — PROCEDURE VISIT (OUTPATIENT)
Age: 74
End: 2024-05-30

## 2024-05-30 ENCOUNTER — OFFICE VISIT (OUTPATIENT)
Age: 74
End: 2024-05-30
Payer: MEDICARE

## 2024-05-30 VITALS
OXYGEN SATURATION: 97 % | BODY MASS INDEX: 30.16 KG/M2 | HEIGHT: 74 IN | RESPIRATION RATE: 16 BRPM | DIASTOLIC BLOOD PRESSURE: 84 MMHG | SYSTOLIC BLOOD PRESSURE: 144 MMHG | WEIGHT: 235 LBS | HEART RATE: 64 BPM

## 2024-05-30 DIAGNOSIS — I49.5 SICK SINUS SYNDROME (HCC): ICD-10-CM

## 2024-05-30 DIAGNOSIS — Z95.0 PACEMAKER: ICD-10-CM

## 2024-05-30 DIAGNOSIS — I48.3 TYPICAL ATRIAL FLUTTER (HCC): ICD-10-CM

## 2024-05-30 DIAGNOSIS — Z86.79 S/P ABLATION OF ATRIAL FIBRILLATION: ICD-10-CM

## 2024-05-30 DIAGNOSIS — Z86.79 S/P ABLATION OF ATRIAL FLUTTER: ICD-10-CM

## 2024-05-30 DIAGNOSIS — Z95.0 CARDIAC PACEMAKER IN SITU: Primary | ICD-10-CM

## 2024-05-30 DIAGNOSIS — Z95.818 PRESENCE OF WATCHMAN LEFT ATRIAL APPENDAGE CLOSURE DEVICE: ICD-10-CM

## 2024-05-30 DIAGNOSIS — Z98.890 S/P ABLATION OF ATRIAL FIBRILLATION: ICD-10-CM

## 2024-05-30 DIAGNOSIS — I10 PRIMARY HYPERTENSION: ICD-10-CM

## 2024-05-30 DIAGNOSIS — I48.0 PAF (PAROXYSMAL ATRIAL FIBRILLATION) (HCC): Primary | ICD-10-CM

## 2024-05-30 DIAGNOSIS — G47.33 OSA ON CPAP: ICD-10-CM

## 2024-05-30 DIAGNOSIS — Z98.890 S/P ABLATION OF ATRIAL FLUTTER: ICD-10-CM

## 2024-05-30 PROCEDURE — G8427 DOCREV CUR MEDS BY ELIG CLIN: HCPCS | Performed by: NURSE PRACTITIONER

## 2024-05-30 PROCEDURE — 1123F ACP DISCUSS/DSCN MKR DOCD: CPT | Performed by: NURSE PRACTITIONER

## 2024-05-30 PROCEDURE — 1036F TOBACCO NON-USER: CPT | Performed by: NURSE PRACTITIONER

## 2024-05-30 PROCEDURE — 3079F DIAST BP 80-89 MM HG: CPT | Performed by: NURSE PRACTITIONER

## 2024-05-30 PROCEDURE — 99214 OFFICE O/P EST MOD 30 MIN: CPT | Performed by: NURSE PRACTITIONER

## 2024-05-30 PROCEDURE — G8417 CALC BMI ABV UP PARAM F/U: HCPCS | Performed by: NURSE PRACTITIONER

## 2024-05-30 PROCEDURE — 3077F SYST BP >= 140 MM HG: CPT | Performed by: NURSE PRACTITIONER

## 2024-05-30 RX ORDER — AZELASTINE HCL 205.5 UG/1
1 SPRAY NASAL AS NEEDED
COMMUNITY

## 2024-05-30 RX ORDER — FLUTICASONE FUROATE 27.5 UG/1
2 SPRAY, METERED NASAL DAILY PRN
COMMUNITY

## 2024-05-30 NOTE — PROGRESS NOTES
Room #:  5    Chief Complaint   Patient presents with    Atrial Fibrillation    Device Check     [place] PPM     Vitals:    05/30/24 1407 05/30/24 1425   BP: (!) 152/82 (!) 144/84  Comment: BP rechecked   Site: Left Upper Arm Right Upper Arm   Position: Sitting Sitting   Cuff Size: Medium Adult Medium Adult   Pulse: 64    Resp: 16    SpO2: 97%    Weight: 106.6 kg (235 lb)    Height: 1.88 m (6' 2\")            Chest pain: NO       1. Have you been to the ER, urgent care clinic outside [facility] since your last visit?  Hospitalized since your last visit?  NO        Refills:  NO  
EC tablet Take 1 tablet by mouth daily      atorvastatin (LIPITOR) 10 MG tablet Take 1 tablet by mouth daily      lisinopril (PRINIVIL;ZESTRIL) 10 MG tablet Take 1 tablet by mouth daily       No current facility-administered medications for this visit.          CELESTINO Farley - NP  [facility]  [address], [address]  [place], [place] [address] [phone]      CC:Juanito Giang MD

## 2025-02-04 PROCEDURE — 93294 REM INTERROG EVL PM/LDLS PM: CPT | Performed by: INTERNAL MEDICINE

## 2025-05-09 PROCEDURE — 93294 REM INTERROG EVL PM/LDLS PM: CPT | Performed by: INTERNAL MEDICINE

## 2025-06-11 ENCOUNTER — OFFICE VISIT (OUTPATIENT)
Age: 75
End: 2025-06-11
Payer: MEDICARE

## 2025-06-11 ENCOUNTER — PROCEDURE VISIT (OUTPATIENT)
Age: 75
End: 2025-06-11
Payer: MEDICARE

## 2025-06-11 VITALS
HEART RATE: 60 BPM | HEIGHT: 74 IN | OXYGEN SATURATION: 94 % | WEIGHT: 247 LBS | BODY MASS INDEX: 31.7 KG/M2 | DIASTOLIC BLOOD PRESSURE: 80 MMHG | SYSTOLIC BLOOD PRESSURE: 160 MMHG

## 2025-06-11 DIAGNOSIS — Z95.0 PACEMAKER: ICD-10-CM

## 2025-06-11 DIAGNOSIS — Z95.0 CARDIAC PACEMAKER IN SITU: Primary | ICD-10-CM

## 2025-06-11 DIAGNOSIS — I45.10 INCOMPLETE RBBB: ICD-10-CM

## 2025-06-11 DIAGNOSIS — G47.33 OBSTRUCTIVE SLEEP APNEA: ICD-10-CM

## 2025-06-11 DIAGNOSIS — Z95.818 PRESENCE OF WATCHMAN LEFT ATRIAL APPENDAGE CLOSURE DEVICE: ICD-10-CM

## 2025-06-11 DIAGNOSIS — I48.0 PAF (PAROXYSMAL ATRIAL FIBRILLATION) (HCC): Primary | ICD-10-CM

## 2025-06-11 PROCEDURE — G8417 CALC BMI ABV UP PARAM F/U: HCPCS | Performed by: INTERNAL MEDICINE

## 2025-06-11 PROCEDURE — 99214 OFFICE O/P EST MOD 30 MIN: CPT | Performed by: INTERNAL MEDICINE

## 2025-06-11 PROCEDURE — 93280 PM DEVICE PROGR EVAL DUAL: CPT | Performed by: INTERNAL MEDICINE

## 2025-06-11 PROCEDURE — 1123F ACP DISCUSS/DSCN MKR DOCD: CPT | Performed by: INTERNAL MEDICINE

## 2025-06-11 PROCEDURE — G8427 DOCREV CUR MEDS BY ELIG CLIN: HCPCS | Performed by: INTERNAL MEDICINE

## 2025-06-11 PROCEDURE — 1160F RVW MEDS BY RX/DR IN RCRD: CPT | Performed by: INTERNAL MEDICINE

## 2025-06-11 PROCEDURE — G2211 COMPLEX E/M VISIT ADD ON: HCPCS | Performed by: INTERNAL MEDICINE

## 2025-06-11 PROCEDURE — 1159F MED LIST DOCD IN RCRD: CPT | Performed by: INTERNAL MEDICINE

## 2025-06-11 PROCEDURE — 1036F TOBACCO NON-USER: CPT | Performed by: INTERNAL MEDICINE

## 2025-06-11 RX ORDER — NICOTINE 14MG/24HR
PATCH, TRANSDERMAL 24 HOURS TRANSDERMAL
COMMUNITY

## 2025-06-11 NOTE — PROGRESS NOTES
Cardiac Electrophysiology Office Follow-up    NAME: Ulices Bosch Jr.   :  1950  MRM:  [id number]    Date:  2025         Assessment and Plan:     1. PAF (paroxysmal atrial fibrillation) (HCC)  2. Pacemaker  3. Presence of Watchman left atrial appendage closure device  4. Incomplete RBBB  5. Obstructive sleep apnea           Paroxysmal atrial fibrillation  - Echo 3/2/21: EF 60-65%, mildly dilated left atrium   -Previously no significant atrial arrhythmias but in the past year has developed atrial fibrillation with increasing burden down to 10%  - S/p Watchman implant 3/2/21 (Jeffry)   - s/p PVI with teresa line of right veins, GP ablation, persistent CTI block confirmed (10/11/23-Noel)  -AF burden decreased from 10% to 0% post ablation  -  I'm pleased to hear how well the patient has done post ablation. We spoke in great detail regarding the importance of multidisciplinary management of AFib and the role of risk factors modification in preventing recurrent AF including focusing on diet, weight loss, control of blood pressure, glycemic control, JEAN CLAUDE diagnosis and treatment, and medication compliance.  - stop Flecainide  - emphasized CPAP compliance  - weight loss goal 23 lbs  - Stop Xarelto on 23, on Aspirin  - AF burden of <1%  - FU with NP in 1 year  - FU with Dr. Cohen in 2 years    [employer] dual chamber pacemaker  Normal lead and device function  10.5 years on battery   86% A-paced  <1% V-paced  10% AF burden -->0%  - Continue Q3 month remote device transmissions. Follow-up in the EP clinic in one year, or sooner for any concerns.     Paroxysmal atrial flutter  - S/p ablation 10/15/20 (Jeffry)     Hypertension  - BP elevated today, confirmed lower at home  - Continue lisinopril       JEAN CLAUDE  History of JEAN CLAUDE on CPAP. Patient reports compliance with CPAP machine. Spoke in details regarding the importance of CPAP compliance and the associated risks of JEAN CLAUDE.      Patient is an established

## 2025-06-11 NOTE — PROGRESS NOTES
Chief Complaint   Patient presents with    Atrial Fibrillation    Other     SSS, JEAN CLAUDE     Vitals:    06/11/25 1410 06/11/25 1416   BP: (!) 160/80 (!) 160/80   BP Site: Left Upper Arm    Patient Position: Sitting    BP Cuff Size: Medium Adult    Pulse: 60    SpO2: 94%    Weight: 112 kg (247 lb)    Height: 1.88 m (6' 2\")       BP (!) 160/80   Pulse 60   Ht 1.88 m (6' 2\")   Wt 112 kg (247 lb)   SpO2 94%   BMI 31.71 kg/m²

## 2025-06-11 NOTE — PATIENT INSTRUCTIONS
[facility]  Emphasized CPAP compliance  Goal weight loss of 10%, 25 lbs  Goal blood pressure less than 130/90 mmHg    Continue remote transmission every 3 months  FU with NP in 1 year  FU with Dr. Cohen in 2 years

## (undated) PROCEDURE — 0FT44ZZ RESECTION OF GALLBLADDER, PERCUTANEOUS ENDOSCOPIC APPROACH: ICD-10-PCS

## (undated) PROCEDURE — 0DB80ZZ EXCISION OF SMALL INTESTINE, OPEN APPROACH: ICD-10-PCS

## (undated) PROCEDURE — 8E0W4CZ ROBOTIC ASSISTED PROCEDURE OF TRUNK REGION, PERCUTANEOUS ENDOSCOPIC APPROACH: ICD-10-PCS

## (undated) PROCEDURE — 0DN80ZZ RELEASE SMALL INTESTINE, OPEN APPROACH: ICD-10-PCS

## (undated) DEVICE — CATH BI DIR 7FR DEFL CS NON --

## (undated) DEVICE — REM POLYHESIVE ADULT PATIENT RETURN ELECTRODE: Brand: VALLEYLAB

## (undated) DEVICE — CATHETER US 8FR L90CM GRN TIP OVERLAY FOR GE-VIVID I VIVID

## (undated) DEVICE — SUTURE PDS + SZ 1 L96IN ABSRB VLT L65MM TP-1 1/2 CIR PDP880G

## (undated) DEVICE — PRESSURE MONITORING SET: Brand: TRUWAVE

## (undated) DEVICE — CHECK-FLO PERFORMER INTRODUCER: Brand: PERFORMER

## (undated) DEVICE — TUBING PMP FOR CARTO SYS SMARTABLATE

## (undated) DEVICE — ZIP 8I SURGICAL SKIN CLOSURE DEVICE: Brand: ZIP 8I SURGICAL SKIN CLOSURE DEVICE

## (undated) DEVICE — ROBOTIC GENERAL-SFMC: Brand: MEDLINE INDUSTRIES, INC.

## (undated) DEVICE — ANCHOR TISSUE RETRIEVAL SYSTEM, BAG SIZE 125 ML, PORT SIZE 8 MM: Brand: ANCHOR TISSUE RETRIEVAL SYSTEM

## (undated) DEVICE — SUTURE VICRYL COAT SZ 4-0 L18IN ABSRB UD L19MM PS-2 1/2 CIR J496G

## (undated) DEVICE — SEALER TISS L20CM DIA13MM ADV BPLR L CRV JAW OPN APPRCH

## (undated) DEVICE — SCISSORS ENDOSCP DIA5MM CRV MPLR CAUT W/ RATCH HNDL

## (undated) DEVICE — SUTURE VICRYL + SZ 3-0 L18IN ABSRB UD SH 1/2 CIR TAPERCUT NDL VCP864D

## (undated) DEVICE — 3M™ IOBAN™ 2 ANTIMICROBIAL INCISE DRAPE 6640EZ: Brand: IOBAN™ 2

## (undated) DEVICE — CATHETER ABLAT 8FR L115CM 1-6-2MM SPC TIP 3.5MM DF CRV

## (undated) DEVICE — AGENT HEMSTAT W4XL4IN OXIDIZED REGENERATED CELOS STRUCTURED

## (undated) DEVICE — ACCESS SHEATH WITH DILATOR: Brand: WATCHMAN™ TRUSEAL™ ACCESS SYSTEM

## (undated) DEVICE — SHEATH INTRO 8.5FR L71CM 8.5FR DIL GWIRE L180CM DIA0.032IN

## (undated) DEVICE — RESERVOIR,SUCTION,100CC,SILICONE: Brand: MEDLINE

## (undated) DEVICE — CATHETER EP 7FR L115CM 2-8-2MM SPC TIP 2MM 10 ELECTRD F L

## (undated) DEVICE — SUTURE MCRYL SZ 4-0 L27IN ABSRB UD SH L26MM 1/2 CIR Y415H

## (undated) DEVICE — ELECTRODE PT RET AD L9FT HI MOIST COND ADH HYDRGEL CORDED

## (undated) DEVICE — MEDI-TRACE CADENCE ADULT, DEFIBRILLATION ELECTRODE -RTS  (10 PR/PK) - PHYSIO-CONTROL: Brand: MEDI-TRACE CADENCE

## (undated) DEVICE — DRAIN SURG W10XL20CM SIL SMOOTH FLAT 3/4 PERF DBL WRP

## (undated) DEVICE — CATH NAVISTAR BIDIR FJ 8MM --

## (undated) DEVICE — TRANSFER SET 3": Brand: MEDLINE INDUSTRIES, INC.

## (undated) DEVICE — 3M™ TEGADERM™ TRANSPARENT FILM DRESSING FRAME STYLE, 1626W, 4 IN X 4-3/4 IN (10 CM X 12 CM), 50/CT 4CT/CASE: Brand: 3M™ TEGADERM™

## (undated) DEVICE — HEART CATH-MRMC: Brand: MEDLINE INDUSTRIES, INC.

## (undated) DEVICE — PERCLOSE PROGLIDE™ SUTURE-MEDIATED CLOSURE SYSTEM: Brand: PERCLOSE PROGLIDE™

## (undated) DEVICE — INTRO SHTH HMSTAS 8.5FR 60CM --

## (undated) DEVICE — Device: Brand: PROTRACK PIGTAIL WIRE

## (undated) DEVICE — SUTURE PERMAHAND SZ 3-0 L18IN NONABSORBABLE BLK L26MM SH C013D

## (undated) DEVICE — 450 ML BOTTLE OF 0.05% CHLORHEXIDINE GLUCONATE IN 99.95% STERILE WATER FOR IRRIGATION, USP AND APPLICATOR.: Brand: IRRISEPT ANTIMICROBIAL WOUND LAVAGE

## (undated) DEVICE — YANKAUER,POOLE TIP,STERILE,50/CS: Brand: MEDLINE

## (undated) DEVICE — WASTE KIT - ST MARY: Brand: MEDLINE INDUSTRIES, INC.

## (undated) DEVICE — AIRSEAL BIFURCATED SMOKE EVAC FILTERED TUBE SET: Brand: AIRSEAL

## (undated) DEVICE — SPONGE DRN W4XL4IN RAYON/POLYESTER 6 PLY NONWOVEN PRECUT 2 PER PK

## (undated) DEVICE — SUTURE VICRYL + SZ 3-0 L27IN ABSRB UD L26MM SH 1/2 CIR VCP416H

## (undated) DEVICE — ASTOUND STANDARD SURGICAL GOWN, XXL: Brand: CONVERTORS

## (undated) DEVICE — BANDAGE ADH W2XL4IN NITRL FAB STRP CURAD

## (undated) DEVICE — SEAL

## (undated) DEVICE — INTRO SHTH 7FR 13X20CM -- TEARAWAY

## (undated) DEVICE — CABLE CATH L10FT RED PIN CONN 34-34 FOR THERMOCOOL

## (undated) DEVICE — CABLE CATH L2.7M CONNECTS TO CARTO 3 SYS PIU FOR LASSO ECO

## (undated) DEVICE — INTRODUCER SHTH 11FR CANN L23CM DIL TIP 45MM YEL W/O MINI

## (undated) DEVICE — ARM DRAPE

## (undated) DEVICE — PROVE COVER: Brand: UNBRANDED

## (undated) DEVICE — BLADELESS OBTURATOR: Brand: WECK VISTA

## (undated) DEVICE — Z DISCONTINUED USE 2220241 SUTURE SZ 0 27IN 5/8 CIR UR-6  TAPER PT VIOLET ABSRB VICRYL J603H

## (undated) DEVICE — CABLE CATH L10FT RED PIN CONN 25-34 FOR NAVISTAR CARTO 3

## (undated) DEVICE — PINNACLE INTRODUCER SHEATH: Brand: PINNACLE

## (undated) DEVICE — ELECTRO LUBE IS A SINGLE PATIENT USE DEVICE THAT IS INTENDED TO BE USED ON ELECTROSURGICAL ELECTRODES TO REDUCE STICKING.: Brand: KEY SURGICAL ELECTRO LUBE

## (undated) DEVICE — TORFLEX TRANSSEPTAL SHEATH; TRANSSEPTAL DILATOR; J-TIP GUIDEWIRE: Brand: TORFLEX TRANSSEPTAL GUIDING SHEATH

## (undated) DEVICE — YANKAUER,BULB TIP,W/O VENT,RIGID,STERILE: Brand: MEDLINE

## (undated) DEVICE — SOLUTION IRRIG 500ML 0.9% SOD CHLO USP POUR PLAS BTL

## (undated) DEVICE — HOLDER NDL FOAM BLK ADH BK

## (undated) DEVICE — IRRISEPT

## (undated) DEVICE — Device: Brand: PADPRO

## (undated) DEVICE — PACK PROCEDURE SURG HRT CATH

## (undated) DEVICE — CATHETER MAP D CRV 3-3-3-3-3 MM SPC GALAXY OCTARAY

## (undated) DEVICE — RELOAD STPL L60MM H1.5-3.6MM REG TISS BLU GRIPPING SURF B

## (undated) DEVICE — INSUFFLATION NEEDLE TO ESTABLISH PNEUMOPERITONEUM.: Brand: INSUFFLATION NEEDLE

## (undated) DEVICE — DRESSING HEMOSTATIC SFT INTVENT W/O SLT DBL WRP QUIKCLOT LF

## (undated) DEVICE — SOLUTION IRRIG 1000ML STRL H2O USP PLAS POUR BTL

## (undated) DEVICE — SYRINGE IRRIG 60ML SFT PLIABLE BLB EZ TO GRP 1 HND USE W/

## (undated) DEVICE — ELECTRODE,RADIOTRANSLUCENT,FOAM,5PK: Brand: MEDLINE

## (undated) DEVICE — GLOVE SURG SZ 65 THK91MIL LTX FREE SYN POLYISOPRENE

## (undated) DEVICE — SUTURE STRATAFIX SPRL MCRYL + SZ 3-0 L24IN ABSRB UD PS-2 SXMP1B108

## (undated) DEVICE — LIMB HOLDER, WRIST/ANKLE: Brand: DEROYAL

## (undated) DEVICE — STAPLER SKIN H3.9MM WIRE DIA0.58MM CRWN 6.9MM 35 STPL ROT

## (undated) DEVICE — PATCH REF EXT FOR CARTO 3 SYS (EA = 6 PACKS)

## (undated) DEVICE — SUTURE STRATAFIX SPRL PDS + SZ 2-0 L9IN ABSRB VLT CT-1 SXPP1B456

## (undated) DEVICE — LARGE, DISPOSABLE ALEXIS O C-SECTION PROTECTOR - RETRACTOR: Brand: ALEXIS ® O C-SECTION PROTECTOR - RETRACTOR

## (undated) DEVICE — 1LYRTR 16FR10ML100%SIL UMS SNP: Brand: MEDLINE INDUSTRIES, INC.

## (undated) DEVICE — PATCH CARTO 3 EXT REF --

## (undated) DEVICE — ANGIOGRAPHIC CATHETER: Brand: IMPULSE™

## (undated) DEVICE — PACEMAKER PACK: Brand: MEDLINE INDUSTRIES, INC.

## (undated) DEVICE — Z DUPLICATE USE 2275497 DRSG POSTOP PRMSL AG 3.5X6IN

## (undated) DEVICE — DRESSING BORDERED ADH GZ UNIV GEN USE 8INX4IN AND 6INX2IN

## (undated) DEVICE — COVER CATH ACUNAV ULTRASOUND 5X72IN ANTI STATIC

## (undated) DEVICE — Device: Brand: RFP-100A CONNECTOR CABLE

## (undated) DEVICE — MTS LEFT HEART KIT ST MARY'S RICHMOND: Brand: NAMIC

## (undated) DEVICE — DRAPE FLD WRM W44XL66IN C6L FOR INTRATEMP SYS THERMABASIN

## (undated) DEVICE — Device

## (undated) DEVICE — SUTURE ETHBND EXCEL SZ 2 L30IN NONABSORBABLE GRN L40MM V-37 MX69G

## (undated) DEVICE — Device: Brand: NRG TRANSSEPTAL NEEDLE

## (undated) DEVICE — TUBING, SUCTION, 1/4" X 10', STRAIGHT: Brand: MEDLINE

## (undated) DEVICE — DRAIN SURG 15FR SIL RND CHN W/ TRCR FULL FLUT DBL WRP TRAD

## (undated) DEVICE — STAPLER 60MM POWERED ECHELON 3000  SHORT 340MM

## (undated) DEVICE — CABLE CATH L10FT BLU CONN 10-34 PIN ELECTROGRAM CONDUCTION

## (undated) DEVICE — PAD GROUNDING ADLT ADH FOIL 9FT CORD UNIV

## (undated) DEVICE — SPONGE LAPAROTOMY W18XL18IN WHITE STRUNG RADIOPAQUE STERILE